# Patient Record
Sex: MALE | Race: WHITE | NOT HISPANIC OR LATINO | Employment: FULL TIME | URBAN - METROPOLITAN AREA
[De-identification: names, ages, dates, MRNs, and addresses within clinical notes are randomized per-mention and may not be internally consistent; named-entity substitution may affect disease eponyms.]

---

## 2017-07-17 ENCOUNTER — HOSPITAL ENCOUNTER (OUTPATIENT)
Dept: FAMILY MEDICINE CLINIC | Facility: CLINIC | Age: 57
Setting detail: SPECIMEN
Discharge: HOME | End: 2017-07-17
Attending: FAMILY MEDICINE | Admitting: FAMILY MEDICINE

## 2017-07-17 LAB
ALBUMIN SERPL-MCNC: 4 G/DL (ref 3.5–4.8)
ALBUMIN/GLOB SERPL: 1.5 {RATIO} (ref 1–1.7)
ALP SERPL-CCNC: 45 IU/L (ref 32–91)
ALT SERPL-CCNC: 26 IU/L (ref 17–63)
ANION GAP SERPL CALC-SCNC: 15.5 MMOL/L (ref 10–20)
AST SERPL-CCNC: 22 IU/L (ref 15–41)
BASOPHILS # BLD AUTO: 0.1 10*3/UL (ref 0–0.2)
BASOPHILS NFR BLD AUTO: 1 % (ref 0–2)
BILIRUB SERPL-MCNC: 1.1 MG/DL (ref 0.3–1.2)
BUN SERPL-MCNC: 12 MG/DL (ref 8–20)
BUN/CREAT SERPL: 10.9 (ref 6.2–20.3)
CALCIUM SERPL-MCNC: 9.1 MG/DL (ref 8.9–10.3)
CHLORIDE SERPL-SCNC: 99 MMOL/L (ref 101–111)
CHOLEST SERPL-MCNC: 222 MG/DL
CHOLEST/HDLC SERPL: 3 {RATIO}
CONV CO2: 26 MMOL/L (ref 22–32)
CONV LDL CHOLESTEROL DIRECT: 126 MG/DL (ref 0–100)
CONV TOTAL PROTEIN: 6.7 G/DL (ref 6.1–7.9)
CREAT UR-MCNC: 1.1 MG/DL (ref 0.7–1.2)
DIFFERENTIAL METHOD BLD: (no result)
EOSINOPHIL # BLD AUTO: 0.1 10*3/UL (ref 0–0.3)
EOSINOPHIL # BLD AUTO: 1 % (ref 0–3)
ERYTHROCYTE [DISTWIDTH] IN BLOOD BY AUTOMATED COUNT: 14.9 % (ref 11.5–14.5)
GLOBULIN UR ELPH-MCNC: 2.7 G/DL (ref 2.5–3.8)
GLUCOSE SERPL-MCNC: 98 MG/DL (ref 65–99)
HCT VFR BLD AUTO: 52.5 % (ref 40–54)
HDLC SERPL-MCNC: 74 MG/DL
HGB BLD-MCNC: 17.4 G/DL (ref 14–18)
LDLC/HDLC SERPL: 1.7 {RATIO}
LIPID INTERPRETATION: ABNORMAL
LYMPHOCYTES # BLD AUTO: 1.2 10*3/UL (ref 0.8–4.8)
LYMPHOCYTES NFR BLD AUTO: 15 % (ref 18–42)
MCH RBC QN AUTO: 31.4 PG (ref 26–32)
MCHC RBC AUTO-ENTMCNC: 33.1 G/DL (ref 32–36)
MCV RBC AUTO: 94.8 FL (ref 80–94)
MONOCYTES # BLD AUTO: 0.9 10*3/UL (ref 0.1–1.3)
MONOCYTES NFR BLD AUTO: 12 % (ref 2–11)
NEUTROPHILS # BLD AUTO: 5.4 10*3/UL (ref 2.3–8.6)
NEUTROPHILS NFR BLD AUTO: 71 % (ref 50–75)
NRBC BLD AUTO-RTO: 0 /100{WBCS}
NRBC/RBC NFR BLD MANUAL: 0 10*3/UL
PLATELET # BLD AUTO: 170 10*3/UL (ref 150–450)
PMV BLD AUTO: 10.1 FL (ref 7.4–10.4)
POTASSIUM SERPL-SCNC: 4.5 MMOL/L (ref 3.6–5.1)
PSA SERPL-MCNC: 0.82 NG/ML (ref 0–4)
RBC # BLD AUTO: 5.53 10*6/UL (ref 4.6–6)
SODIUM SERPL-SCNC: 136 MMOL/L (ref 136–144)
TESTOST SERPL-MCNC: 2.28 NG/ML (ref 1.7–7.8)
TRIGL SERPL-MCNC: 137 MG/DL
TSH SERPL-ACNC: 2.82 UIU/ML (ref 0.34–5.6)
VLDLC SERPL CALC-MCNC: 21.4 MG/DL
WBC # BLD AUTO: 7.7 10*3/UL (ref 4.5–11.5)

## 2017-10-16 ENCOUNTER — HOSPITAL ENCOUNTER (OUTPATIENT)
Dept: OTHER | Facility: HOSPITAL | Age: 57
Discharge: HOME | End: 2017-10-16
Attending: SURGERY | Admitting: SURGERY

## 2017-10-16 LAB
ANION GAP SERPL CALC-SCNC: 11 MMOL/L (ref 10–20)
BACTERIA SPEC AEROBE CULT: NORMAL
BASOPHILS # BLD AUTO: 0.1 10*3/UL (ref 0–0.2)
BASOPHILS NFR BLD AUTO: 1 % (ref 0–2)
BUN SERPL-MCNC: 12 MG/DL (ref 8–20)
BUN/CREAT SERPL: 10.9 (ref 6.2–20.3)
CALCIUM SERPL-MCNC: 9.3 MG/DL (ref 8.9–10.3)
CHLORIDE SERPL-SCNC: 98 MMOL/L (ref 101–111)
CONV ANISOCYTES: SLIGHT
CONV CO2: 28 MMOL/L (ref 22–32)
CONV SMEAR REVIEW: (no result)
CREAT UR-MCNC: 1.1 MG/DL (ref 0.7–1.2)
DIFFERENTIAL METHOD BLD: (no result)
EOSINOPHIL # BLD AUTO: 0.1 10*3/UL (ref 0–0.3)
EOSINOPHIL # BLD AUTO: 1 % (ref 0–3)
ERYTHROCYTE [DISTWIDTH] IN BLOOD BY AUTOMATED COUNT: 14.8 % (ref 11.5–14.5)
GLUCOSE SERPL-MCNC: 85 MG/DL (ref 65–99)
HCT VFR BLD AUTO: 46.8 % (ref 40–54)
HGB BLD-MCNC: 15.6 G/DL (ref 14–18)
LYMPHOCYTES # BLD AUTO: 1.5 10*3/UL (ref 0.8–4.8)
LYMPHOCYTES NFR BLD AUTO: 19 % (ref 18–42)
Lab: NORMAL
MCH RBC QN AUTO: 31.5 PG (ref 26–32)
MCHC RBC AUTO-ENTMCNC: 33.4 G/DL (ref 32–36)
MCV RBC AUTO: 94.3 FL (ref 80–94)
MICRO REPORT STATUS: NORMAL
MONOCYTES # BLD AUTO: 1.2 10*3/UL (ref 0.1–1.3)
MONOCYTES NFR BLD AUTO: 15 % (ref 2–11)
NEUTROPHILS # BLD AUTO: 5 10*3/UL (ref 2.3–8.6)
NEUTROPHILS NFR BLD AUTO: 64 % (ref 50–75)
NRBC BLD AUTO-RTO: 0 /100{WBCS}
PLATELET # BLD AUTO: 98 10*3/UL (ref 150–450)
PLT COMMENT: (no result)
PMV BLD AUTO: 9.8 FL (ref 7.4–10.4)
POTASSIUM SERPL-SCNC: 4 MMOL/L (ref 3.6–5.1)
RBC # BLD AUTO: 4.96 10*6/UL (ref 4.6–6)
SODIUM SERPL-SCNC: 133 MMOL/L (ref 136–144)
SPECIMEN SOURCE: NORMAL
WBC # BLD AUTO: 7.9 10*3/UL (ref 4.5–11.5)

## 2017-10-23 ENCOUNTER — HOSPITAL ENCOUNTER (OUTPATIENT)
Dept: PREOP | Facility: HOSPITAL | Age: 57
Setting detail: HOSPITAL OUTPATIENT SURGERY
Discharge: HOME | End: 2017-10-23
Attending: SURGERY | Admitting: SURGERY

## 2017-10-23 LAB
APTT BLD: 25.6 SEC (ref 24–31)
CONV ADP AGGREGATION, % PLATELET: 93 % (ref 86–100)
INR PPP: 1.1
PROTHROMBIN TIME: 11.4 SEC (ref 9.6–11.7)

## 2018-01-09 ENCOUNTER — HOSPITAL ENCOUNTER (OUTPATIENT)
Dept: FAMILY MEDICINE CLINIC | Facility: CLINIC | Age: 58
Setting detail: SPECIMEN
Discharge: HOME | End: 2018-01-09
Attending: FAMILY MEDICINE | Admitting: FAMILY MEDICINE

## 2018-01-09 LAB
BASOPHILS # BLD AUTO: 0.1 10*3/UL (ref 0–0.2)
BASOPHILS NFR BLD AUTO: 1 % (ref 0–2)
CHOLEST SERPL-MCNC: 188 MG/DL
CHOLEST/HDLC SERPL: 2.1 {RATIO}
CONV LDL CHOLESTEROL DIRECT: 87 MG/DL (ref 0–100)
DIFFERENTIAL METHOD BLD: (no result)
EOSINOPHIL # BLD AUTO: 0.1 10*3/UL (ref 0–0.3)
EOSINOPHIL # BLD AUTO: 2 % (ref 0–3)
ERYTHROCYTE [DISTWIDTH] IN BLOOD BY AUTOMATED COUNT: 14.5 % (ref 11.5–14.5)
HCT VFR BLD AUTO: 49.1 % (ref 40–54)
HDLC SERPL-MCNC: 89 MG/DL
HGB BLD-MCNC: 16.4 G/DL (ref 14–18)
LDLC/HDLC SERPL: 1 {RATIO}
LIPID INTERPRETATION: NORMAL
LYMPHOCYTES # BLD AUTO: 1.4 10*3/UL (ref 0.8–4.8)
LYMPHOCYTES NFR BLD AUTO: 19 % (ref 18–42)
MCH RBC QN AUTO: 31.3 PG (ref 26–32)
MCHC RBC AUTO-ENTMCNC: 33.4 G/DL (ref 32–36)
MCV RBC AUTO: 93.8 FL (ref 80–94)
MONOCYTES # BLD AUTO: 1 10*3/UL (ref 0.1–1.3)
MONOCYTES NFR BLD AUTO: 13 % (ref 2–11)
NEUTROPHILS # BLD AUTO: 5.1 10*3/UL (ref 2.3–8.6)
NEUTROPHILS NFR BLD AUTO: 65 % (ref 50–75)
NRBC BLD AUTO-RTO: 0 /100{WBCS}
NRBC/RBC NFR BLD MANUAL: 0 10*3/UL
PLATELET # BLD AUTO: 122 10*3/UL (ref 150–450)
PMV BLD AUTO: 10.5 FL (ref 7.4–10.4)
RBC # BLD AUTO: 5.23 10*6/UL (ref 4.6–6)
TRIGL SERPL-MCNC: 57 MG/DL
VLDLC SERPL CALC-MCNC: 12.5 MG/DL
WBC # BLD AUTO: 7.7 10*3/UL (ref 4.5–11.5)

## 2018-10-24 ENCOUNTER — HOSPITAL ENCOUNTER (OUTPATIENT)
Dept: FAMILY MEDICINE CLINIC | Facility: CLINIC | Age: 58
Setting detail: SPECIMEN
Discharge: HOME | End: 2018-10-24
Attending: FAMILY MEDICINE | Admitting: FAMILY MEDICINE

## 2018-10-24 LAB
ALBUMIN SERPL-MCNC: 3.7 G/DL (ref 3.5–4.8)
ALBUMIN/GLOB SERPL: 1.5 {RATIO} (ref 1–1.7)
ALP SERPL-CCNC: 47 IU/L (ref 32–91)
ALT SERPL-CCNC: 31 IU/L (ref 17–63)
ANION GAP SERPL CALC-SCNC: 14.3 MMOL/L (ref 10–20)
AST SERPL-CCNC: 29 IU/L (ref 15–41)
BASOPHILS # BLD AUTO: 0.1 10*3/UL (ref 0–0.2)
BASOPHILS NFR BLD AUTO: 1 % (ref 0–2)
BILIRUB SERPL-MCNC: 0.8 MG/DL (ref 0.3–1.2)
BILIRUB UR QL STRIP: NEGATIVE MG/DL
BUN SERPL-MCNC: 13 MG/DL (ref 8–20)
BUN/CREAT SERPL: 13 (ref 6.2–20.3)
CALCIUM SERPL-MCNC: 8.7 MG/DL (ref 8.9–10.3)
CASTS URNS QL MICRO: NORMAL /[LPF]
CHLORIDE SERPL-SCNC: 101 MMOL/L (ref 101–111)
CHOLEST SERPL-MCNC: 185 MG/DL
CHOLEST/HDLC SERPL: 1.9 {RATIO}
COLOR UR: YELLOW
CONV BACTERIA IN URINE MICRO: NEGATIVE
CONV CLARITY OF URINE: CLEAR
CONV CO2: 25 MMOL/L (ref 22–32)
CONV HYALINE CASTS IN URINE MICRO: 0 /[LPF] (ref 0–5)
CONV LDL CHOLESTEROL DIRECT: 79 MG/DL (ref 0–100)
CONV PROTEIN IN URINE BY AUTOMATED TEST STRIP: NEGATIVE MG/DL
CONV SMALL ROUND CELLS: NORMAL /[HPF]
CONV TOTAL PROTEIN: 6.2 G/DL (ref 6.1–7.9)
CONV UROBILINOGEN IN URINE BY AUTOMATED TEST STRIP: 0.2 MG/DL
CREAT UR-MCNC: 1 MG/DL (ref 0.7–1.2)
CULTURE INDICATED?: NORMAL
DIFFERENTIAL METHOD BLD: (no result)
EOSINOPHIL # BLD AUTO: 0.2 10*3/UL (ref 0–0.3)
EOSINOPHIL # BLD AUTO: 3 % (ref 0–3)
ERYTHROCYTE [DISTWIDTH] IN BLOOD BY AUTOMATED COUNT: 15 % (ref 11.5–14.5)
GLOBULIN UR ELPH-MCNC: 2.5 G/DL (ref 2.5–3.8)
GLUCOSE SERPL-MCNC: 98 MG/DL (ref 65–99)
GLUCOSE UR QL: NEGATIVE MG/DL
HCT VFR BLD AUTO: 46.7 % (ref 40–54)
HDLC SERPL-MCNC: 96 MG/DL
HGB BLD-MCNC: 15.9 G/DL (ref 14–18)
HGB UR QL STRIP: NEGATIVE
KETONES UR QL STRIP: NEGATIVE MG/DL
LDLC/HDLC SERPL: 0.8 {RATIO}
LEUKOCYTE ESTERASE UR QL STRIP: NEGATIVE
LIPID INTERPRETATION: NORMAL
LYMPHOCYTES # BLD AUTO: 1 10*3/UL (ref 0.8–4.8)
LYMPHOCYTES NFR BLD AUTO: 18 % (ref 18–42)
MCH RBC QN AUTO: 31.8 PG (ref 26–32)
MCHC RBC AUTO-ENTMCNC: 34 G/DL (ref 32–36)
MCV RBC AUTO: 93.5 FL (ref 80–94)
MONOCYTES # BLD AUTO: 0.8 10*3/UL (ref 0.1–1.3)
MONOCYTES NFR BLD AUTO: 15 % (ref 2–11)
NEUTROPHILS # BLD AUTO: 3.4 10*3/UL (ref 2.3–8.6)
NEUTROPHILS NFR BLD AUTO: 63 % (ref 50–75)
NITRITE UR QL STRIP: NEGATIVE
NRBC BLD AUTO-RTO: 0 /100{WBCS}
NRBC/RBC NFR BLD MANUAL: 0 10*3/UL
PH UR STRIP.AUTO: 8 [PH] (ref 4.5–8)
PLATELET # BLD AUTO: 106 10*3/UL (ref 150–450)
PMV BLD AUTO: 9.8 FL (ref 7.4–10.4)
POTASSIUM SERPL-SCNC: 4.3 MMOL/L (ref 3.6–5.1)
PSA SERPL-MCNC: 0.89 NG/ML (ref 0–4)
RBC # BLD AUTO: 4.99 10*6/UL (ref 4.6–6)
RBC #/AREA URNS HPF: 1 /[HPF] (ref 0–3)
SODIUM SERPL-SCNC: 136 MMOL/L (ref 136–144)
SP GR UR: 1.02 (ref 1–1.03)
SPERM URNS QL MICRO: NORMAL /[HPF]
SQUAMOUS SPT QL MICRO: 0 /[HPF] (ref 0–5)
TESTOST SERPL-MCNC: 8.3 NG/ML (ref 1.7–7.8)
TRIGL SERPL-MCNC: 36 MG/DL
TSH SERPL-ACNC: 3.15 UIU/ML (ref 0.34–5.6)
UNIDENT CRYS URNS QL MICRO: NORMAL /[HPF]
VLDLC SERPL CALC-MCNC: 10.8 MG/DL
WBC # BLD AUTO: 5.4 10*3/UL (ref 4.5–11.5)
WBC #/AREA URNS HPF: 1 /[HPF] (ref 0–5)
YEAST SPEC QL WET PREP: NORMAL /[HPF]

## 2019-04-29 ENCOUNTER — CONVERSION ENCOUNTER (OUTPATIENT)
Dept: FAMILY MEDICINE CLINIC | Facility: CLINIC | Age: 59
End: 2019-04-29

## 2019-04-29 ENCOUNTER — HOSPITAL ENCOUNTER (OUTPATIENT)
Dept: FAMILY MEDICINE CLINIC | Facility: CLINIC | Age: 59
Setting detail: SPECIMEN
Discharge: HOME | End: 2019-04-29
Attending: FAMILY MEDICINE | Admitting: FAMILY MEDICINE

## 2019-04-29 LAB
ALBUMIN SERPL-MCNC: 3.9 G/DL (ref 3.5–4.8)
ALBUMIN/GLOB SERPL: 1.4 {RATIO} (ref 1–1.7)
ALP SERPL-CCNC: 41 IU/L (ref 32–91)
ALT SERPL-CCNC: 27 IU/L (ref 17–63)
ANION GAP SERPL CALC-SCNC: 13.6 MMOL/L (ref 10–20)
AST SERPL-CCNC: 27 IU/L (ref 15–41)
BASOPHILS # BLD AUTO: 0.1 10*3/UL (ref 0–0.2)
BASOPHILS NFR BLD AUTO: 1 % (ref 0–2)
BILIRUB SERPL-MCNC: 0.7 MG/DL (ref 0.3–1.2)
BUN SERPL-MCNC: 8 MG/DL (ref 8–20)
BUN/CREAT SERPL: 7.3 (ref 6.2–20.3)
CALCIUM SERPL-MCNC: 9.3 MG/DL (ref 8.9–10.3)
CHLORIDE SERPL-SCNC: 101 MMOL/L (ref 101–111)
CHOLEST SERPL-MCNC: 155 MG/DL
CHOLEST/HDLC SERPL: 2 {RATIO}
CONV CO2: 26 MMOL/L (ref 22–32)
CONV LDL CHOLESTEROL DIRECT: 66 MG/DL (ref 0–100)
CONV TOTAL PROTEIN: 6.7 G/DL (ref 6.1–7.9)
CREAT UR-MCNC: 1.1 MG/DL (ref 0.7–1.2)
DIFFERENTIAL METHOD BLD: (no result)
EOSINOPHIL # BLD AUTO: 0.1 10*3/UL (ref 0–0.3)
EOSINOPHIL # BLD AUTO: 2 % (ref 0–3)
ERYTHROCYTE [DISTWIDTH] IN BLOOD BY AUTOMATED COUNT: 14.7 % (ref 11.5–14.5)
GLOBULIN UR ELPH-MCNC: 2.8 G/DL (ref 2.5–3.8)
GLUCOSE SERPL-MCNC: 100 MG/DL (ref 65–99)
HCT VFR BLD AUTO: 50.3 % (ref 40–54)
HDLC SERPL-MCNC: 77 MG/DL
HGB BLD-MCNC: 16.7 G/DL (ref 14–18)
LDLC/HDLC SERPL: 0.9 {RATIO}
LIPID INTERPRETATION: NORMAL
LYMPHOCYTES # BLD AUTO: 1 10*3/UL (ref 0.8–4.8)
LYMPHOCYTES NFR BLD AUTO: 18 % (ref 18–42)
MCH RBC QN AUTO: 31.3 PG (ref 26–32)
MCHC RBC AUTO-ENTMCNC: 33.1 G/DL (ref 32–36)
MCV RBC AUTO: 94.5 FL (ref 80–94)
MONOCYTES # BLD AUTO: 0.7 10*3/UL (ref 0.1–1.3)
MONOCYTES NFR BLD AUTO: 13 % (ref 2–11)
NEUTROPHILS # BLD AUTO: 3.7 10*3/UL (ref 2.3–8.6)
NEUTROPHILS NFR BLD AUTO: 66 % (ref 50–75)
NRBC BLD AUTO-RTO: 0 /100{WBCS}
NRBC/RBC NFR BLD MANUAL: 0 10*3/UL
PLATELET # BLD AUTO: 131 10*3/UL (ref 150–450)
PMV BLD AUTO: 10.2 FL (ref 7.4–10.4)
POTASSIUM SERPL-SCNC: 4.6 MMOL/L (ref 3.6–5.1)
RBC # BLD AUTO: 5.32 10*6/UL (ref 4.6–6)
SODIUM SERPL-SCNC: 136 MMOL/L (ref 136–144)
TRIGL SERPL-MCNC: 41 MG/DL
VLDLC SERPL CALC-MCNC: 11.3 MG/DL
WBC # BLD AUTO: 5.7 10*3/UL (ref 4.5–11.5)

## 2019-06-03 VITALS
WEIGHT: 250 LBS | SYSTOLIC BLOOD PRESSURE: 130 MMHG | DIASTOLIC BLOOD PRESSURE: 86 MMHG | HEART RATE: 68 BPM | BODY MASS INDEX: 35.87 KG/M2 | RESPIRATION RATE: 8 BRPM

## 2019-07-17 ENCOUNTER — TRANSCRIBE ORDERS (OUTPATIENT)
Dept: ADMINISTRATIVE | Facility: HOSPITAL | Age: 59
End: 2019-07-17

## 2019-07-17 DIAGNOSIS — Z00.00 ROUTINE GENERAL MEDICAL EXAMINATION AT A HEALTH CARE FACILITY: Primary | ICD-10-CM

## 2019-07-29 RX ORDER — ATENOLOL 50 MG/1
TABLET ORAL
Qty: 30 TABLET | Refills: 8 | Status: SHIPPED | OUTPATIENT
Start: 2019-07-29 | End: 2020-04-24

## 2019-07-29 RX ORDER — OMEPRAZOLE 40 MG/1
CAPSULE, DELAYED RELEASE ORAL
Qty: 30 CAPSULE | Refills: 2 | Status: SHIPPED | OUTPATIENT
Start: 2019-07-29 | End: 2020-10-26

## 2019-08-12 RX ORDER — ATORVASTATIN CALCIUM 20 MG/1
TABLET, FILM COATED ORAL
Qty: 30 TABLET | Refills: 3 | Status: SHIPPED | OUTPATIENT
Start: 2019-08-12 | End: 2019-12-14 | Stop reason: SDUPTHER

## 2019-09-12 ENCOUNTER — TELEPHONE (OUTPATIENT)
Dept: FAMILY MEDICINE CLINIC | Facility: CLINIC | Age: 59
End: 2019-09-12

## 2019-09-12 RX ORDER — DULOXETIN HYDROCHLORIDE 60 MG/1
1 CAPSULE, DELAYED RELEASE ORAL EVERY 24 HOURS
COMMUNITY
Start: 2017-08-24 | End: 2019-12-30

## 2019-09-12 RX ORDER — CITALOPRAM 20 MG/1
TABLET ORAL EVERY 24 HOURS
COMMUNITY
Start: 2017-12-03 | End: 2019-10-13 | Stop reason: SDUPTHER

## 2019-09-12 NOTE — TELEPHONE ENCOUNTER
[facility] called questioning pt being on Cymbalta and Celxa.  Before I call them back, I need to make sure that he, in fact, is to be on both?  Both are in centricity as well.  Thanks!

## 2019-09-27 ENCOUNTER — HOSPITAL ENCOUNTER (OUTPATIENT)
Dept: CT IMAGING | Facility: HOSPITAL | Age: 59
Discharge: HOME | End: 2019-09-27

## 2019-09-27 ENCOUNTER — HOSPITAL ENCOUNTER (OUTPATIENT)
Dept: CARDIOLOGY | Facility: HOSPITAL | Age: 59
Discharge: HOME | End: 2019-09-27
Admitting: FAMILY MEDICINE

## 2019-09-27 DIAGNOSIS — Z00.00 ROUTINE GENERAL MEDICAL EXAMINATION AT A HEALTH CARE FACILITY: ICD-10-CM

## 2019-09-27 LAB
BH CV XLRA MEAS - MID AO DIAM: 1.94 CM
BH CV XLRA MEAS - PAD LEFT ABI PT: 1.33
BH CV XLRA MEAS - PAD LEFT ARM: 119 MMHG
BH CV XLRA MEAS - PAD LEFT LEG PT: 158 MMHG
BH CV XLRA MEAS - PAD RIGHT ABI PT: 1.2
BH CV XLRA MEAS - PAD RIGHT ARM: 118 MMHG
BH CV XLRA MEAS - PAD RIGHT LEG PT: 143 MMHG
BH CV XLRA MEAS LEFT CCA RATIO VEL: 72.9 CM/SEC
BH CV XLRA MEAS LEFT ICA RATIO VEL: -66.7 CM/SEC
BH CV XLRA MEAS LEFT ICA/CCA RATIO: -0.91
BH CV XLRA MEAS RIGHT CCA RATIO VEL: 76 CM/SEC
BH CV XLRA MEAS RIGHT ICA RATIO VEL: -68 CM/SEC
BH CV XLRA MEAS RIGHT ICA/CCA RATIO: -0.89

## 2019-09-27 PROCEDURE — 75571 CT HRT W/O DYE W/CA TEST: CPT

## 2019-09-27 PROCEDURE — 93799 UNLISTED CV SVC/PROCEDURE: CPT

## 2019-09-27 RX ORDER — TRAZODONE HYDROCHLORIDE 150 MG/1
TABLET ORAL
Qty: 30 TABLET | Refills: 2 | Status: SHIPPED | OUTPATIENT
Start: 2019-09-27 | End: 2019-12-27

## 2019-10-14 RX ORDER — CITALOPRAM 20 MG/1
TABLET ORAL
Qty: 30 TABLET | Refills: 5 | Status: SHIPPED | OUTPATIENT
Start: 2019-10-14 | End: 2020-04-14

## 2019-10-25 ENCOUNTER — OFFICE VISIT (OUTPATIENT)
Dept: FAMILY MEDICINE CLINIC | Facility: CLINIC | Age: 59
End: 2019-10-25

## 2019-10-25 VITALS
SYSTOLIC BLOOD PRESSURE: 128 MMHG | DIASTOLIC BLOOD PRESSURE: 80 MMHG | RESPIRATION RATE: 12 BRPM | WEIGHT: 259.8 LBS | BODY MASS INDEX: 37.28 KG/M2 | HEART RATE: 67 BPM

## 2019-10-25 DIAGNOSIS — R25.2 LEG CRAMPS: ICD-10-CM

## 2019-10-25 DIAGNOSIS — M75.81 ROTATOR CUFF TENDONITIS, RIGHT: ICD-10-CM

## 2019-10-25 DIAGNOSIS — M75.51 SUBACROMIAL BURSITIS OF RIGHT SHOULDER JOINT: ICD-10-CM

## 2019-10-25 DIAGNOSIS — Z00.00 PHYSICAL EXAM: Primary | ICD-10-CM

## 2019-10-25 DIAGNOSIS — D50.9 IRON DEFICIENCY ANEMIA, UNSPECIFIED IRON DEFICIENCY ANEMIA TYPE: ICD-10-CM

## 2019-10-25 DIAGNOSIS — Z12.11 COLON CANCER SCREENING: ICD-10-CM

## 2019-10-25 DIAGNOSIS — M17.0 PRIMARY OSTEOARTHRITIS OF BOTH KNEES: ICD-10-CM

## 2019-10-25 PROBLEM — K21.9 GASTROESOPHAGEAL REFLUX DISEASE: Status: ACTIVE | Noted: 2019-10-25

## 2019-10-25 LAB
ALBUMIN SERPL-MCNC: 4.1 G/DL (ref 3.5–5.2)
ALBUMIN/GLOB SERPL: 1.2 G/DL
ALP SERPL-CCNC: 58 U/L (ref 39–117)
ALT SERPL W P-5'-P-CCNC: 21 U/L (ref 1–41)
ANION GAP SERPL CALCULATED.3IONS-SCNC: 12.3 MMOL/L (ref 5–15)
AST SERPL-CCNC: 24 U/L (ref 1–40)
BASOPHILS # BLD AUTO: 0.1 10*3/MM3 (ref 0–0.2)
BASOPHILS NFR BLD AUTO: 1.7 % (ref 0–1.5)
BILIRUB SERPL-MCNC: 0.6 MG/DL (ref 0.2–1.2)
BILIRUB UR QL STRIP: NEGATIVE
BUN BLD-MCNC: 11 MG/DL (ref 6–20)
BUN/CREAT SERPL: 11.5 (ref 7–25)
CALCIUM SPEC-SCNC: 9.3 MG/DL (ref 8.6–10.5)
CHLORIDE SERPL-SCNC: 99 MMOL/L (ref 98–107)
CHOLEST SERPL-MCNC: 189 MG/DL (ref 0–200)
CLARITY UR: CLEAR
CO2 SERPL-SCNC: 25.7 MMOL/L (ref 22–29)
COLOR UR: YELLOW
CREAT BLD-MCNC: 0.96 MG/DL (ref 0.76–1.27)
DEPRECATED RDW RBC AUTO: 42 FL (ref 37–54)
EOSINOPHIL # BLD AUTO: 0.06 10*3/MM3 (ref 0–0.4)
EOSINOPHIL NFR BLD AUTO: 1 % (ref 0.3–6.2)
ERYTHROCYTE [DISTWIDTH] IN BLOOD BY AUTOMATED COUNT: 13 % (ref 12.3–15.4)
GFR SERPL CREATININE-BSD FRML MDRD: 80 ML/MIN/1.73
GLOBULIN UR ELPH-MCNC: 3.3 GM/DL
GLUCOSE BLD-MCNC: 95 MG/DL (ref 65–99)
GLUCOSE UR STRIP-MCNC: NEGATIVE MG/DL
HCT VFR BLD AUTO: 37 % (ref 37.5–51)
HDLC SERPL-MCNC: 105 MG/DL (ref 40–60)
HGB BLD-MCNC: 13 G/DL (ref 13–17.7)
HGB UR QL STRIP.AUTO: NEGATIVE
IMM GRANULOCYTES # BLD AUTO: 0.02 10*3/MM3 (ref 0–0.05)
IMM GRANULOCYTES NFR BLD AUTO: 0.3 % (ref 0–0.5)
KETONES UR QL STRIP: NEGATIVE
LDLC SERPL CALC-MCNC: 73 MG/DL (ref 0–100)
LDLC/HDLC SERPL: 0.7 {RATIO}
LEUKOCYTE ESTERASE UR QL STRIP.AUTO: NEGATIVE
LYMPHOCYTES # BLD AUTO: 0.95 10*3/MM3 (ref 0.7–3.1)
LYMPHOCYTES NFR BLD AUTO: 15.8 % (ref 19.6–45.3)
MCH RBC QN AUTO: 31.8 PG (ref 26.6–33)
MCHC RBC AUTO-ENTMCNC: 35.1 G/DL (ref 31.5–35.7)
MCV RBC AUTO: 90.5 FL (ref 79–97)
MONOCYTES # BLD AUTO: 0.95 10*3/MM3 (ref 0.1–0.9)
MONOCYTES NFR BLD AUTO: 15.8 % (ref 5–12)
NEUTROPHILS # BLD AUTO: 3.95 10*3/MM3 (ref 1.7–7)
NEUTROPHILS NFR BLD AUTO: 65.4 % (ref 42.7–76)
NITRITE UR QL STRIP: NEGATIVE
NRBC BLD AUTO-RTO: 0 /100 WBC (ref 0–0.2)
PH UR STRIP.AUTO: 7 [PH] (ref 5–8)
PLATELET # BLD AUTO: 156 10*3/MM3 (ref 140–450)
PMV BLD AUTO: 12.6 FL (ref 6–12)
POTASSIUM BLD-SCNC: 4.2 MMOL/L (ref 3.5–5.2)
PROT SERPL-MCNC: 7.4 G/DL (ref 6–8.5)
PROT UR QL STRIP: NEGATIVE
PSA SERPL-MCNC: 0.69 NG/ML (ref 0–4)
RBC # BLD AUTO: 4.09 10*6/MM3 (ref 4.14–5.8)
SODIUM BLD-SCNC: 137 MMOL/L (ref 136–145)
SP GR UR STRIP: 1.01 (ref 1–1.03)
TESTOST SERPL-MCNC: 158 NG/DL (ref 193–740)
TRIGL SERPL-MCNC: 53 MG/DL (ref 0–150)
TSH SERPL DL<=0.05 MIU/L-ACNC: 2 UIU/ML (ref 0.27–4.2)
UROBILINOGEN UR QL STRIP: NORMAL
VLDLC SERPL-MCNC: 10.6 MG/DL (ref 5–40)
WBC NRBC COR # BLD: 6.03 10*3/MM3 (ref 3.4–10.8)

## 2019-10-25 PROCEDURE — 80053 COMPREHEN METABOLIC PANEL: CPT | Performed by: FAMILY MEDICINE

## 2019-10-25 PROCEDURE — 82728 ASSAY OF FERRITIN: CPT | Performed by: FAMILY MEDICINE

## 2019-10-25 PROCEDURE — 83540 ASSAY OF IRON: CPT | Performed by: FAMILY MEDICINE

## 2019-10-25 PROCEDURE — 99396 PREV VISIT EST AGE 40-64: CPT | Performed by: FAMILY MEDICINE

## 2019-10-25 PROCEDURE — 84466 ASSAY OF TRANSFERRIN: CPT | Performed by: FAMILY MEDICINE

## 2019-10-25 PROCEDURE — 84443 ASSAY THYROID STIM HORMONE: CPT | Performed by: FAMILY MEDICINE

## 2019-10-25 PROCEDURE — 36415 COLL VENOUS BLD VENIPUNCTURE: CPT | Performed by: FAMILY MEDICINE

## 2019-10-25 PROCEDURE — G0103 PSA SCREENING: HCPCS | Performed by: FAMILY MEDICINE

## 2019-10-25 PROCEDURE — 81003 URINALYSIS AUTO W/O SCOPE: CPT | Performed by: FAMILY MEDICINE

## 2019-10-25 PROCEDURE — 80061 LIPID PANEL: CPT | Performed by: FAMILY MEDICINE

## 2019-10-25 PROCEDURE — 85025 COMPLETE CBC W/AUTO DIFF WBC: CPT | Performed by: FAMILY MEDICINE

## 2019-10-25 PROCEDURE — 84403 ASSAY OF TOTAL TESTOSTERONE: CPT | Performed by: FAMILY MEDICINE

## 2019-10-25 RX ORDER — MELOXICAM 15 MG/1
15 TABLET ORAL DAILY
Qty: 30 TABLET | Refills: 11 | Status: SHIPPED | OUTPATIENT
Start: 2019-10-25 | End: 2020-11-06

## 2019-10-25 RX ORDER — AMLODIPINE BESYLATE 2.5 MG/1
TABLET ORAL
COMMUNITY
Start: 2019-01-29 | End: 2020-01-21

## 2019-10-25 RX ORDER — LISINOPRIL 40 MG/1
TABLET ORAL EVERY 24 HOURS
COMMUNITY
Start: 2018-07-11 | End: 2019-10-28 | Stop reason: SDUPTHER

## 2019-10-25 NOTE — PROGRESS NOTES
Rooming Tab(CC,VS,Pt Hx,Fall Screen)  Chief Complaint   Patient presents with   • Annual Exam       Subjective 59-year-old here for his physical.  Patient is not exercising and eating poorly.  His weight has gone back up.  He was off testosterone because his insurance would not cover it.  He is also having bad leg cramps usually at night, and inner thighs primarily.  He has significant water intake and is not dehydrated.  He also drinks a significant amount of caffeine.  He denies any chest pain or dizziness or syncope.  He has no complaints of his bowel or bladder function has no blood in his stool or urine.  He has had some right sided shoulder pain that started 6 months ago.  He has had no injury.  He has difficulty raising his right shoulder up, he is right-handed.    I have reviewed and updated his medications, medical history and problem list during today's office visit.     Patient Care Team:  Cuong Burrell MD as PCP - General    Problem List Tab  Medications Tab  Synopsis Tab  Chart Review Tab  Care Everywhere Tab  Immunizations Tab  Patient History Tab    Social History     Tobacco Use   • Smoking status: Never Smoker   • Smokeless tobacco: Never Used   Substance Use Topics   • Alcohol use: Yes     Frequency: 2-3 times a week     Drinks per session: 1 or 2     Binge frequency: Monthly       Review of Systems   Constitutional: Negative for chills, fatigue and fever.   HENT: Negative for nosebleeds.    Eyes: Negative for double vision.   Respiratory: Negative for chest tightness and shortness of breath.    Cardiovascular: Negative for chest pain and palpitations.   Gastrointestinal: Negative for blood in stool.   Genitourinary: Negative for dysuria and hematuria.   Neurological: Negative for dizziness and syncope.   Psychiatric/Behavioral: Negative for depressed mood.       Objective     Rooming Tab(CC,VS,Pt Hx,Fall Screen)  /80 (BP Location: Right arm, Patient Position: Sitting, Cuff Size: Large  Adult)   Pulse 67   Resp 12   Wt 118 kg (259 lb 12.8 oz)   BMI 37.28 kg/m²     Body mass index is 37.28 kg/m².    Physical Exam   Constitutional: He is oriented to person, place, and time. He appears well-developed and well-nourished. No distress.   Obese, pleasant, no acute distress   HENT:   Head: Normocephalic and atraumatic.   Nose: Nose normal.   Mouth/Throat: Oropharynx is clear and moist.   Eyes: Conjunctivae, EOM and lids are normal. Pupils are equal, round, and reactive to light.   Neck: Trachea normal and normal range of motion. Neck supple. No JVD present. Carotid bruit is not present. No thyroid mass and no thyromegaly present.   No carotid bruits   Cardiovascular: Normal rate, regular rhythm, normal heart sounds and intact distal pulses.   Pulmonary/Chest: Effort normal and breath sounds normal.   Abdominal: Soft. Bowel sounds are normal. He exhibits no distension and no mass. There is no tenderness.   Obese   Genitourinary: Rectum normal and prostate normal.   Musculoskeletal:   No c/c/e  Good dorsalis pedis pulses.  Decreased range of motion of right shoulder and abduction against resistance   Neurological: He is alert and oriented to person, place, and time. No cranial nerve deficit.   Skin: Skin is warm and dry.   Psychiatric: He has a normal mood and affect. His speech is normal and behavior is normal. Judgment and thought content normal. He is attentive.   Nursing note and vitals reviewed.       Statin Choice Calculator  Data Reviewed:               Lab Results   Component Value Date    BUN 11 10/25/2019    CREATININE 0.96 10/25/2019    EGFRIFNONA 80 10/25/2019     10/25/2019    K 4.2 10/25/2019    CL 99 10/25/2019    CALCIUM 9.3 10/25/2019    ALBUMIN 4.10 10/25/2019    BILITOT 0.6 10/25/2019    ALKPHOS 58 10/25/2019    AST 24 10/25/2019    ALT 21 10/25/2019    TRIG 53 10/25/2019     (H) 10/25/2019    VLDL 10.6 10/25/2019    LDL 73 10/25/2019    LDLHDL 0.70 10/25/2019    WBC 6.03  10/25/2019    RBC 4.09 (L) 10/25/2019    HCT 37.0 (L) 10/25/2019    MCV 90.5 10/25/2019    MCH 31.8 10/25/2019    TSH 2.000 10/25/2019    PSA 0.687 10/25/2019      Assessment/Plan   Order Review Tab  Health Maintenance Tab  Patient Plan/Order Tab  Diagnoses and all orders for this visit:    1. Physical exam (Primary)  Assessment & Plan:  exersice for cardiovascular and weight loss  Nutritious diet with low carb, high veg  immunizations    Orders:  -     Cancel: Urinalysis With Microscopic - Urine, Clean Catch  -     CBC & Differential  -     Comprehensive Metabolic Panel  -     Lipid Panel  -     Testosterone  -     TSH  -     Urinalysis With Culture If Indicated - Urine, Clean Catch  -     PSA Screen  -     CBC Auto Differential    2. Colon cancer screening  -     Ambulatory Referral For Screening Colonoscopy    3. Subacromial bursitis of right shoulder joint  Assessment & Plan:  mobic 15mg q d  rom exercises  Consider injection and pt if persists    Orders:  -     Ambulatory Referral to Orthopedic Surgery    4. Rotator cuff tendonitis, right  -     Ambulatory Referral to Orthopedic Surgery    5. Leg cramps  Assessment & Plan:  Decrease caffeine  Hold lipitor for 1 month  stretching      6. Primary osteoarthritis of both knees  -     Ambulatory Referral to Orthopedic Surgery    Other orders  -     meloxicam (MOBIC) 15 MG tablet; Take 1 tablet by mouth Daily. For arthritis  Dispense: 30 tablet; Refill: 11      Wrapup Tab  Return in about 6 months (around 4/25/2020) for Recheck.

## 2019-10-25 NOTE — ASSESSMENT & PLAN NOTE
exersice for cardiovascular and weight loss  Nutritious diet with low carb, high veg  immunizations

## 2019-10-28 ENCOUNTER — LAB (OUTPATIENT)
Dept: FAMILY MEDICINE CLINIC | Facility: CLINIC | Age: 59
End: 2019-10-28

## 2019-10-28 DIAGNOSIS — D50.9 IRON DEFICIENCY ANEMIA, UNSPECIFIED IRON DEFICIENCY ANEMIA TYPE: Primary | ICD-10-CM

## 2019-10-28 DIAGNOSIS — D50.9 IRON DEFICIENCY ANEMIA, UNSPECIFIED IRON DEFICIENCY ANEMIA TYPE: ICD-10-CM

## 2019-10-28 LAB
FERRITIN SERPL-MCNC: 306 NG/ML (ref 30–400)
IRON 24H UR-MRATE: 105 MCG/DL (ref 59–158)
IRON SATN MFR SERPL: 33 % (ref 20–50)
TIBC SERPL-MCNC: 322 MCG/DL (ref 298–536)
TRANSFERRIN SERPL-MCNC: 216 MG/DL (ref 200–360)

## 2019-10-28 PROCEDURE — 82607 VITAMIN B-12: CPT | Performed by: FAMILY MEDICINE

## 2019-10-28 PROCEDURE — 36415 COLL VENOUS BLD VENIPUNCTURE: CPT

## 2019-10-28 PROCEDURE — 82746 ASSAY OF FOLIC ACID SERUM: CPT | Performed by: FAMILY MEDICINE

## 2019-10-28 RX ORDER — LISINOPRIL 40 MG/1
TABLET ORAL
Qty: 30 TABLET | Refills: 6 | Status: SHIPPED | OUTPATIENT
Start: 2019-10-28 | End: 2020-05-26

## 2019-10-29 LAB
FOLATE SERPL-MCNC: >20 NG/ML (ref 4.78–24.2)
VIT B12 BLD-MCNC: 296 PG/ML (ref 211–946)

## 2019-11-01 ENCOUNTER — TELEPHONE (OUTPATIENT)
Dept: FAMILY MEDICINE CLINIC | Facility: CLINIC | Age: 59
End: 2019-11-01

## 2019-11-01 NOTE — TELEPHONE ENCOUNTER
----- Message from Cuong Burrell MD sent at 10/29/2019  2:09 PM EDT -----  Call in  Testosterone  200mg/ml   1 cc im q 2 weeks    #10   Cc   5 refills  Call in 3 cc syringes 1 box   5 erefills  Call in 21 gauge needles    1.5 inch   1 box

## 2019-11-25 RX ORDER — SYRINGE WITH NEEDLE, 1 ML 25GX5/8"
SYRINGE, EMPTY DISPOSABLE MISCELLANEOUS
Qty: 30 EACH | Refills: 0 | Status: ON HOLD | OUTPATIENT
Start: 2019-11-25 | End: 2022-02-03

## 2019-12-16 RX ORDER — ATORVASTATIN CALCIUM 20 MG/1
TABLET, FILM COATED ORAL
Qty: 30 TABLET | Refills: 2 | Status: SHIPPED | OUTPATIENT
Start: 2019-12-16 | End: 2020-04-24

## 2019-12-27 RX ORDER — TRAZODONE HYDROCHLORIDE 150 MG/1
TABLET ORAL
Qty: 30 TABLET | Refills: 1 | Status: SHIPPED | OUTPATIENT
Start: 2019-12-27 | End: 2020-02-24

## 2019-12-27 RX ORDER — TESTOSTERONE CYPIONATE 200 MG/ML
VIAL (ML) INTRAMUSCULAR
Qty: 2 VIAL | Refills: 2 | Status: SHIPPED | OUTPATIENT
Start: 2019-12-27 | End: 2020-03-19

## 2019-12-30 RX ORDER — DULOXETIN HYDROCHLORIDE 60 MG/1
CAPSULE, DELAYED RELEASE ORAL
Qty: 30 CAPSULE | Refills: 10 | Status: SHIPPED | OUTPATIENT
Start: 2019-12-30 | End: 2020-11-24

## 2020-01-21 RX ORDER — AMLODIPINE BESYLATE 2.5 MG/1
TABLET ORAL
Qty: 30 TABLET | Refills: 10 | Status: SHIPPED | OUTPATIENT
Start: 2020-01-21 | End: 2020-12-15

## 2020-01-27 ENCOUNTER — TELEPHONE (OUTPATIENT)
Dept: FAMILY MEDICINE CLINIC | Facility: CLINIC | Age: 60
End: 2020-01-27

## 2020-01-27 NOTE — TELEPHONE ENCOUNTER
Patient left a voice message that he stopped taking his cholesterol medication and the cramping went away.  He started back on it and the cramping came back.  He thinks he needs to be put on a different cholesterol medication.

## 2020-01-27 NOTE — TELEPHONE ENCOUNTER
Tell him I agree  Will try crestor 10mg  1 po q hs   #30   5 refills  Watch for the same side effects

## 2020-01-28 RX ORDER — ROSUVASTATIN CALCIUM 10 MG/1
10 TABLET, COATED ORAL DAILY
Qty: 30 TABLET | Refills: 5 | Status: SHIPPED | OUTPATIENT
Start: 2020-01-28 | End: 2020-07-22

## 2020-02-24 RX ORDER — TRAZODONE HYDROCHLORIDE 150 MG/1
TABLET ORAL
Qty: 30 TABLET | Refills: 0 | Status: SHIPPED | OUTPATIENT
Start: 2020-02-24 | End: 2020-03-23

## 2020-03-19 RX ORDER — TESTOSTERONE CYPIONATE 200 MG/ML
INJECTION, SOLUTION INTRAMUSCULAR
Qty: 10 ML | Refills: 1 | Status: SHIPPED | OUTPATIENT
Start: 2020-03-19 | End: 2020-10-26

## 2020-03-23 RX ORDER — TRAZODONE HYDROCHLORIDE 150 MG/1
TABLET ORAL
Qty: 30 TABLET | Refills: 0 | Status: SHIPPED | OUTPATIENT
Start: 2020-03-23 | End: 2020-04-22

## 2020-04-14 RX ORDER — CITALOPRAM 20 MG/1
TABLET ORAL
Qty: 30 TABLET | Refills: 4 | Status: SHIPPED | OUTPATIENT
Start: 2020-04-14 | End: 2020-09-09

## 2020-04-22 RX ORDER — TRAZODONE HYDROCHLORIDE 150 MG/1
TABLET ORAL
Qty: 30 TABLET | Refills: 0 | Status: SHIPPED | OUTPATIENT
Start: 2020-04-22 | End: 2020-05-19

## 2020-04-24 ENCOUNTER — OFFICE VISIT (OUTPATIENT)
Dept: FAMILY MEDICINE CLINIC | Facility: CLINIC | Age: 60
End: 2020-04-24

## 2020-04-24 DIAGNOSIS — E29.1 HYPOGONADISM IN MALE: ICD-10-CM

## 2020-04-24 DIAGNOSIS — N52.03 COMBINED ARTERIAL INSUFFICIENCY AND CORPORO-VENOUS OCCLUSIVE ERECTILE DYSFUNCTION: ICD-10-CM

## 2020-04-24 DIAGNOSIS — R25.2 LEG CRAMPS: ICD-10-CM

## 2020-04-24 DIAGNOSIS — I10 ESSENTIAL HYPERTENSION: ICD-10-CM

## 2020-04-24 DIAGNOSIS — D64.9 ANEMIA, UNSPECIFIED TYPE: ICD-10-CM

## 2020-04-24 DIAGNOSIS — E53.8 B12 DEFICIENCY: Primary | ICD-10-CM

## 2020-04-24 DIAGNOSIS — E78.00 HYPERCHOLESTEROLEMIA: ICD-10-CM

## 2020-04-24 DIAGNOSIS — R73.9 HYPERGLYCEMIA: ICD-10-CM

## 2020-04-24 LAB
ALBUMIN SERPL-MCNC: 4 G/DL (ref 3.5–5.2)
ALBUMIN/GLOB SERPL: 1.2 G/DL
ALP SERPL-CCNC: 55 U/L (ref 39–117)
ALT SERPL W P-5'-P-CCNC: 25 U/L (ref 1–41)
ANION GAP SERPL CALCULATED.3IONS-SCNC: 11.7 MMOL/L (ref 5–15)
AST SERPL-CCNC: 22 U/L (ref 1–40)
BASOPHILS # BLD AUTO: 0.07 10*3/MM3 (ref 0–0.2)
BASOPHILS NFR BLD AUTO: 1.1 % (ref 0–1.5)
BILIRUB SERPL-MCNC: 0.7 MG/DL (ref 0.2–1.2)
BUN BLD-MCNC: 10 MG/DL (ref 6–20)
BUN/CREAT SERPL: 9.8 (ref 7–25)
CALCIUM SPEC-SCNC: 9.4 MG/DL (ref 8.6–10.5)
CHLORIDE SERPL-SCNC: 97 MMOL/L (ref 98–107)
CHOLEST SERPL-MCNC: 166 MG/DL (ref 0–200)
CO2 SERPL-SCNC: 26.3 MMOL/L (ref 22–29)
CREAT BLD-MCNC: 1.02 MG/DL (ref 0.76–1.27)
DEPRECATED RDW RBC AUTO: 43.2 FL (ref 37–54)
EOSINOPHIL # BLD AUTO: 0.22 10*3/MM3 (ref 0–0.4)
EOSINOPHIL NFR BLD AUTO: 3.3 % (ref 0.3–6.2)
ERYTHROCYTE [DISTWIDTH] IN BLOOD BY AUTOMATED COUNT: 13.1 % (ref 12.3–15.4)
GFR SERPL CREATININE-BSD FRML MDRD: 75 ML/MIN/1.73
GLOBULIN UR ELPH-MCNC: 3.4 GM/DL
GLUCOSE BLD-MCNC: 100 MG/DL (ref 65–99)
HCT VFR BLD AUTO: 48.1 % (ref 37.5–51)
HDLC SERPL-MCNC: 47 MG/DL (ref 40–60)
HGB BLD-MCNC: 16.9 G/DL (ref 13–17.7)
IMM GRANULOCYTES # BLD AUTO: 0.01 10*3/MM3 (ref 0–0.05)
IMM GRANULOCYTES NFR BLD AUTO: 0.2 % (ref 0–0.5)
LDLC SERPL CALC-MCNC: 102 MG/DL (ref 0–100)
LDLC/HDLC SERPL: 2.18 {RATIO}
LYMPHOCYTES # BLD AUTO: 1.15 10*3/MM3 (ref 0.7–3.1)
LYMPHOCYTES NFR BLD AUTO: 17.3 % (ref 19.6–45.3)
MCH RBC QN AUTO: 31.8 PG (ref 26.6–33)
MCHC RBC AUTO-ENTMCNC: 35.1 G/DL (ref 31.5–35.7)
MCV RBC AUTO: 90.6 FL (ref 79–97)
MONOCYTES # BLD AUTO: 0.91 10*3/MM3 (ref 0.1–0.9)
MONOCYTES NFR BLD AUTO: 13.7 % (ref 5–12)
NEUTROPHILS # BLD AUTO: 4.27 10*3/MM3 (ref 1.7–7)
NEUTROPHILS NFR BLD AUTO: 64.4 % (ref 42.7–76)
NRBC BLD AUTO-RTO: 0 /100 WBC (ref 0–0.2)
PLATELET # BLD AUTO: 146 10*3/MM3 (ref 140–450)
PMV BLD AUTO: 12.7 FL (ref 6–12)
POTASSIUM BLD-SCNC: 4.3 MMOL/L (ref 3.5–5.2)
PROT SERPL-MCNC: 7.4 G/DL (ref 6–8.5)
RBC # BLD AUTO: 5.31 10*6/MM3 (ref 4.14–5.8)
SODIUM BLD-SCNC: 135 MMOL/L (ref 136–145)
TESTOST SERPL-MCNC: 412 NG/DL (ref 193–740)
TRIGL SERPL-MCNC: 83 MG/DL (ref 0–150)
VIT B12 BLD-MCNC: 464 PG/ML (ref 211–946)
VLDLC SERPL-MCNC: 16.6 MG/DL (ref 5–40)
WBC NRBC COR # BLD: 6.63 10*3/MM3 (ref 3.4–10.8)

## 2020-04-24 PROCEDURE — 80053 COMPREHEN METABOLIC PANEL: CPT | Performed by: FAMILY MEDICINE

## 2020-04-24 PROCEDURE — 84403 ASSAY OF TOTAL TESTOSTERONE: CPT | Performed by: FAMILY MEDICINE

## 2020-04-24 PROCEDURE — 82607 VITAMIN B-12: CPT | Performed by: FAMILY MEDICINE

## 2020-04-24 PROCEDURE — 99442 PR PHYS/QHP TELEPHONE EVALUATION 11-20 MIN: CPT | Performed by: FAMILY MEDICINE

## 2020-04-24 PROCEDURE — 85025 COMPLETE CBC W/AUTO DIFF WBC: CPT | Performed by: FAMILY MEDICINE

## 2020-04-24 PROCEDURE — 80061 LIPID PANEL: CPT | Performed by: FAMILY MEDICINE

## 2020-04-24 RX ORDER — ATENOLOL 50 MG/1
TABLET ORAL
Qty: 30 TABLET | Refills: 7 | Status: SHIPPED | OUTPATIENT
Start: 2020-04-24 | End: 2021-01-13 | Stop reason: SDUPTHER

## 2020-04-24 RX ORDER — SILDENAFIL 100 MG/1
100 TABLET, FILM COATED ORAL DAILY PRN
Qty: 6 TABLET | Refills: 11 | Status: SHIPPED | OUTPATIENT
Start: 2020-04-24 | End: 2021-04-19 | Stop reason: SDUPTHER

## 2020-04-24 NOTE — PROGRESS NOTES
You have chosen to receive care through a telephone visit. Do you consent to use a telephone visit for your medical care today? Yes    Rooming Tab(CC,VS,Pt Hx,Fall Screen)  Chief Complaint   Patient presents with   • Hyperlipidemia   • Hypogonadism       Subjective Patient is doing a telephone visit today because of the coronavirus.  He states he is bruising a lot and has a history of thrombocytopenia and needs to have his platelet count checked which were going to do today.  He also has a history of anemia with a normal iron and basically negative work-up so we will recheck that today as well.  Patient also has hypogonadism and takes testosterone and his last shot was about 3 weeks ago.  He normally takes 200 mg IM every 2 weeks.  Patient has B12 deficiency and takes B12 at thousand micrograms daily needs to have that rechecked.  Patient has a history of hyperlipidemia and had leg cramping with Lipitor so that was discontinued.  He is tolerating Crestor at this time and needs to have this rechecked.  Patient has erectile dysfunction and would like to try Viagra.  The patient is supposed to have a colonoscopy but he could not get because of COVID-19  Patient has hyperglycemia and is trying to keep his carbohydrate intake low    I have reviewed and updated his medications, medical history and problem list during today's office visit.     Patient Care Team:  Cuong Burrell MD as PCP - General    Problem List Tab  Medications Tab  Synopsis Tab  Chart Review Tab  Care Everywhere Tab  Immunizations Tab  Patient History Tab    Social History     Tobacco Use   • Smoking status: Never Smoker   • Smokeless tobacco: Never Used   Substance Use Topics   • Alcohol use: Yes     Frequency: 2-3 times a week     Drinks per session: 1 or 2     Binge frequency: Monthly       Review of Systems   Constitutional: Negative for chills, fatigue and fever.   HENT: Negative for nosebleeds.    Eyes: Negative for double vision.    Respiratory: Negative for chest tightness and shortness of breath.    Cardiovascular: Negative for chest pain and palpitations.   Gastrointestinal: Negative for blood in stool.   Genitourinary: Negative for dysuria and hematuria.   Neurological: Negative for dizziness and syncope.   Psychiatric/Behavioral: Negative for self-injury, suicidal ideas and depressed mood.       Objective     Rooming Tab(CC,VS,Pt Hx,Fall Screen)  There were no vitals taken for this visit.    There is no height or weight on file to calculate BMI.    Physical Exam     Statin Choice Calculator  Data Reviewed:               Lab Results   Component Value Date    BUN 10 04/24/2020    CREATININE 1.02 04/24/2020    EGFRIFNONA 75 04/24/2020     (L) 04/24/2020    K 4.3 04/24/2020    CL 97 (L) 04/24/2020    CALCIUM 9.4 04/24/2020    ALBUMIN 4.00 04/24/2020    BILITOT 0.7 04/24/2020    ALKPHOS 55 04/24/2020    AST 22 04/24/2020    ALT 25 04/24/2020    TRIG 83 04/24/2020    HDL 47 04/24/2020    VLDL 16.6 04/24/2020     (H) 04/24/2020    LDLHDL 2.18 04/24/2020    WBC 6.63 04/24/2020    RBC 5.31 04/24/2020    HCT 48.1 04/24/2020    MCV 90.6 04/24/2020    MCH 31.8 04/24/2020      Assessment/Plan   Order Review Tab  Health Maintenance Tab  Patient Plan/Order Tab  Diagnoses and all orders for this visit:    1. B12 deficiency (Primary)  -     Vitamin B12    2. Hypercholesterolemia  Assessment & Plan:  Suspect is improved since he started Crestor.  We will plan on rechecking his lipid panel in the near future  Continue diet weight loss and medications    Orders:  -     Lipid Panel  -     Comprehensive Metabolic Panel    3. Essential hypertension  Assessment & Plan:  Hypertension is improving with treatment.  Continue current treatment regimen.  Dietary sodium restriction.  Weight loss.  Regular aerobic exercise.  Continue current medications.  Blood pressure will be reassessed at the next regular appointment.      4. Anemia, unspecified  type  Assessment & Plan:  Stable, will recheck in near future    Orders:  -     CBC & Differential  -     CBC Auto Differential    5. Hypogonadism in male  Assessment & Plan:  Stable, continue testosterone 200 mg IM and will have him come in for a testosterone level in the near future    Orders:  -     Testosterone    6. Leg cramps  Assessment & Plan:  Secondary to Lipitor.  They have improved since he has stopped Lipitor and is taking Crestor      7. Hyperglycemia  Assessment & Plan:  Low-carb diet discussed with the patient, exercise and weight loss.      8. Combined arterial insufficiency and corporo-venous occlusive erectile dysfunction  Assessment & Plan:  Sildenafil 20 mg 1-5 p.o. pre-intercourse      Other orders  -     sildenafil (Viagra) 100 MG tablet; Take 1 tablet by mouth Daily As Needed for Erectile Dysfunction.  Dispense: 6 tablet; Refill: 11      Wrapup Tab  Return for Next scheduled follow up.

## 2020-04-27 NOTE — ASSESSMENT & PLAN NOTE
Stable, continue testosterone 200 mg IM and will have him come in for a testosterone level in the near future

## 2020-04-27 NOTE — ASSESSMENT & PLAN NOTE
Suspect is improved since he started Crestor.  We will plan on rechecking his lipid panel in the near future  Continue diet weight loss and medications

## 2020-05-19 ENCOUNTER — OFFICE (AMBULATORY)
Dept: URBAN - METROPOLITAN AREA PATHOLOGY 4 | Facility: PATHOLOGY | Age: 60
End: 2020-05-19

## 2020-05-19 ENCOUNTER — ON CAMPUS - OUTPATIENT (AMBULATORY)
Dept: URBAN - METROPOLITAN AREA HOSPITAL 2 | Facility: HOSPITAL | Age: 60
End: 2020-05-19
Payer: COMMERCIAL

## 2020-05-19 VITALS
HEART RATE: 57 BPM | DIASTOLIC BLOOD PRESSURE: 71 MMHG | OXYGEN SATURATION: 93 % | DIASTOLIC BLOOD PRESSURE: 62 MMHG | HEIGHT: 71 IN | RESPIRATION RATE: 17 BRPM | OXYGEN SATURATION: 94 % | HEART RATE: 52 BPM | SYSTOLIC BLOOD PRESSURE: 113 MMHG | HEART RATE: 60 BPM | SYSTOLIC BLOOD PRESSURE: 142 MMHG | DIASTOLIC BLOOD PRESSURE: 97 MMHG | WEIGHT: 253.2 LBS | SYSTOLIC BLOOD PRESSURE: 120 MMHG | SYSTOLIC BLOOD PRESSURE: 97 MMHG | OXYGEN SATURATION: 98 % | OXYGEN SATURATION: 99 % | TEMPERATURE: 96.2 F | SYSTOLIC BLOOD PRESSURE: 109 MMHG | HEART RATE: 56 BPM | SYSTOLIC BLOOD PRESSURE: 149 MMHG | DIASTOLIC BLOOD PRESSURE: 68 MMHG | OXYGEN SATURATION: 97 % | RESPIRATION RATE: 16 BRPM | HEART RATE: 51 BPM | DIASTOLIC BLOOD PRESSURE: 85 MMHG | DIASTOLIC BLOOD PRESSURE: 75 MMHG | DIASTOLIC BLOOD PRESSURE: 70 MMHG | DIASTOLIC BLOOD PRESSURE: 78 MMHG | HEART RATE: 55 BPM

## 2020-05-19 DIAGNOSIS — D12.2 BENIGN NEOPLASM OF ASCENDING COLON: ICD-10-CM

## 2020-05-19 DIAGNOSIS — K57.30 DIVERTICULOSIS OF LARGE INTESTINE WITHOUT PERFORATION OR ABS: ICD-10-CM

## 2020-05-19 DIAGNOSIS — Z12.11 ENCOUNTER FOR SCREENING FOR MALIGNANT NEOPLASM OF COLON: ICD-10-CM

## 2020-05-19 DIAGNOSIS — K51.40 INFLAMMATORY POLYPS OF COLON WITHOUT COMPLICATIONS: ICD-10-CM

## 2020-05-19 DIAGNOSIS — D12.3 BENIGN NEOPLASM OF TRANSVERSE COLON: ICD-10-CM

## 2020-05-19 PROBLEM — K63.5 POLYP OF COLON: Status: ACTIVE | Noted: 2020-05-19

## 2020-05-19 LAB
GI HISTOLOGY: A. UNSPECIFIED: (no result)
GI HISTOLOGY: B. UNSPECIFIED: (no result)
GI HISTOLOGY: PDF REPORT: (no result)

## 2020-05-19 PROCEDURE — 88305 TISSUE EXAM BY PATHOLOGIST: CPT | Performed by: INTERNAL MEDICINE

## 2020-05-19 PROCEDURE — 45385 COLONOSCOPY W/LESION REMOVAL: CPT | Mod: 33 | Performed by: INTERNAL MEDICINE

## 2020-05-19 PROCEDURE — 45380 COLONOSCOPY AND BIOPSY: CPT | Mod: 33,59 | Performed by: INTERNAL MEDICINE

## 2020-05-19 RX ORDER — TRAZODONE HYDROCHLORIDE 150 MG/1
TABLET ORAL
Qty: 30 TABLET | Refills: 0 | Status: SHIPPED | OUTPATIENT
Start: 2020-05-19 | End: 2020-06-22

## 2020-05-26 RX ORDER — LISINOPRIL 40 MG/1
TABLET ORAL
Qty: 30 TABLET | Refills: 5 | Status: SHIPPED | OUTPATIENT
Start: 2020-05-26 | End: 2020-11-20

## 2020-06-22 RX ORDER — TRAZODONE HYDROCHLORIDE 150 MG/1
TABLET ORAL
Qty: 30 TABLET | Refills: 0 | Status: SHIPPED | OUTPATIENT
Start: 2020-06-22 | End: 2020-07-20

## 2020-07-20 RX ORDER — TRAZODONE HYDROCHLORIDE 150 MG/1
TABLET ORAL
Qty: 30 TABLET | Refills: 0 | Status: SHIPPED | OUTPATIENT
Start: 2020-07-20 | End: 2020-08-17

## 2020-07-22 RX ORDER — ROSUVASTATIN CALCIUM 10 MG/1
TABLET, COATED ORAL
Qty: 30 TABLET | Refills: 4 | Status: SHIPPED | OUTPATIENT
Start: 2020-07-22 | End: 2021-01-13 | Stop reason: SDUPTHER

## 2020-07-27 ENCOUNTER — TELEPHONE (OUTPATIENT)
Dept: FAMILY MEDICINE CLINIC | Facility: CLINIC | Age: 60
End: 2020-07-27

## 2020-07-27 DIAGNOSIS — R50.9 FEBRILE ILLNESS: Primary | ICD-10-CM

## 2020-07-27 PROCEDURE — U0003 INFECTIOUS AGENT DETECTION BY NUCLEIC ACID (DNA OR RNA); SEVERE ACUTE RESPIRATORY SYNDROME CORONAVIRUS 2 (SARS-COV-2) (CORONAVIRUS DISEASE [COVID-19]), AMPLIFIED PROBE TECHNIQUE, MAKING USE OF HIGH THROUGHPUT TECHNOLOGIES AS DESCRIBED BY CMS-2020-01-R: HCPCS | Performed by: FAMILY MEDICINE

## 2020-07-27 RX ORDER — AZITHROMYCIN 250 MG/1
TABLET, FILM COATED ORAL
Qty: 6 TABLET | Refills: 0 | Status: SHIPPED | OUTPATIENT
Start: 2020-07-27 | End: 2020-10-26

## 2020-07-27 NOTE — TELEPHONE ENCOUNTER
PATIENT CALLED IN AND STATED HE HAS A FEVER AND COUGH  AND MUSCLE ACHES  AND WANTED TO GET TESTED FOR COVID-19 .      PLEASE CALL PATIENT AT  [phone].

## 2020-07-27 NOTE — TELEPHONE ENCOUNTER
I told him to get the testing at the urgent care and he needs to quarantine himself until results are back

## 2020-08-17 RX ORDER — TRAZODONE HYDROCHLORIDE 150 MG/1
TABLET ORAL
Qty: 30 TABLET | Refills: 0 | Status: SHIPPED | OUTPATIENT
Start: 2020-08-17 | End: 2020-09-15

## 2020-09-09 RX ORDER — CITALOPRAM 20 MG/1
TABLET ORAL
Qty: 30 TABLET | Refills: 3 | Status: SHIPPED | OUTPATIENT
Start: 2020-09-09 | End: 2021-01-04

## 2020-09-15 RX ORDER — TRAZODONE HYDROCHLORIDE 150 MG/1
TABLET ORAL
Qty: 30 TABLET | Refills: 0 | Status: SHIPPED | OUTPATIENT
Start: 2020-09-15 | End: 2020-10-12

## 2020-10-12 RX ORDER — TRAZODONE HYDROCHLORIDE 150 MG/1
TABLET ORAL
Qty: 30 TABLET | Refills: 11 | Status: SHIPPED | OUTPATIENT
Start: 2020-10-12 | End: 2021-10-20 | Stop reason: SDUPTHER

## 2020-10-26 ENCOUNTER — LAB (OUTPATIENT)
Dept: FAMILY MEDICINE CLINIC | Facility: CLINIC | Age: 60
End: 2020-10-26

## 2020-10-26 ENCOUNTER — OFFICE VISIT (OUTPATIENT)
Dept: FAMILY MEDICINE CLINIC | Facility: CLINIC | Age: 60
End: 2020-10-26

## 2020-10-26 VITALS
TEMPERATURE: 97.3 F | RESPIRATION RATE: 16 BRPM | HEART RATE: 51 BPM | WEIGHT: 257.4 LBS | BODY MASS INDEX: 36.93 KG/M2 | OXYGEN SATURATION: 100 % | DIASTOLIC BLOOD PRESSURE: 78 MMHG | SYSTOLIC BLOOD PRESSURE: 138 MMHG

## 2020-10-26 DIAGNOSIS — Z90.81 H/O SPLENECTOMY: ICD-10-CM

## 2020-10-26 DIAGNOSIS — I10 ESSENTIAL HYPERTENSION: Primary | ICD-10-CM

## 2020-10-26 DIAGNOSIS — E78.00 HYPERCHOLESTEROLEMIA: ICD-10-CM

## 2020-10-26 DIAGNOSIS — R73.9 HYPERGLYCEMIA: ICD-10-CM

## 2020-10-26 DIAGNOSIS — E29.1 HYPOGONADISM IN MALE: ICD-10-CM

## 2020-10-26 DIAGNOSIS — Z12.5 PROSTATE CANCER SCREENING: ICD-10-CM

## 2020-10-26 DIAGNOSIS — D64.9 ANEMIA, UNSPECIFIED TYPE: ICD-10-CM

## 2020-10-26 PROBLEM — D69.6 THROMBOCYTOPENIA (HCC): Status: ACTIVE | Noted: 2020-10-26

## 2020-10-26 LAB
BASOPHILS # BLD AUTO: 0.08 10*3/MM3 (ref 0–0.2)
BASOPHILS NFR BLD AUTO: 1.6 % (ref 0–1.5)
DEPRECATED RDW RBC AUTO: 47.7 FL (ref 37–54)
EOSINOPHIL # BLD AUTO: 0.12 10*3/MM3 (ref 0–0.4)
EOSINOPHIL NFR BLD AUTO: 2.4 % (ref 0.3–6.2)
ERYTHROCYTE [DISTWIDTH] IN BLOOD BY AUTOMATED COUNT: 14.3 % (ref 12.3–15.4)
GLUCOSE P FAST SERPL-MCNC: 99 MG/DL (ref 74–106)
HCT VFR BLD AUTO: 43.9 % (ref 37.5–51)
HGB BLD-MCNC: 14.8 G/DL (ref 13–17.7)
IMM GRANULOCYTES # BLD AUTO: 0.01 10*3/MM3 (ref 0–0.05)
IMM GRANULOCYTES NFR BLD AUTO: 0.2 % (ref 0–0.5)
LYMPHOCYTES # BLD AUTO: 1.09 10*3/MM3 (ref 0.7–3.1)
LYMPHOCYTES NFR BLD AUTO: 22.2 % (ref 19.6–45.3)
MCH RBC QN AUTO: 30.4 PG (ref 26.6–33)
MCHC RBC AUTO-ENTMCNC: 33.7 G/DL (ref 31.5–35.7)
MCV RBC AUTO: 90.1 FL (ref 79–97)
MONOCYTES # BLD AUTO: 0.65 10*3/MM3 (ref 0.1–0.9)
MONOCYTES NFR BLD AUTO: 13.2 % (ref 5–12)
NEUTROPHILS NFR BLD AUTO: 2.96 10*3/MM3 (ref 1.7–7)
NEUTROPHILS NFR BLD AUTO: 60.4 % (ref 42.7–76)
NRBC BLD AUTO-RTO: 0 /100 WBC (ref 0–0.2)
PLATELET # BLD AUTO: 172 10*3/MM3 (ref 140–450)
PMV BLD AUTO: 12.9 FL (ref 6–12)
PSA SERPL-MCNC: 0.7 NG/ML (ref 0–4)
RBC # BLD AUTO: 4.87 10*6/MM3 (ref 4.14–5.8)
TESTOST SERPL-MCNC: 330 NG/DL (ref 193–740)
WBC # BLD AUTO: 4.91 10*3/MM3 (ref 3.4–10.8)

## 2020-10-26 PROCEDURE — 85025 COMPLETE CBC W/AUTO DIFF WBC: CPT | Performed by: FAMILY MEDICINE

## 2020-10-26 PROCEDURE — 99213 OFFICE O/P EST LOW 20 MIN: CPT | Performed by: FAMILY MEDICINE

## 2020-10-26 PROCEDURE — G0103 PSA SCREENING: HCPCS | Performed by: FAMILY MEDICINE

## 2020-10-26 PROCEDURE — 84403 ASSAY OF TOTAL TESTOSTERONE: CPT | Performed by: FAMILY MEDICINE

## 2020-10-26 PROCEDURE — 82947 ASSAY GLUCOSE BLOOD QUANT: CPT | Performed by: FAMILY MEDICINE

## 2020-10-26 NOTE — PROGRESS NOTES
Rooming Tab(CC,VS,Pt Hx,Fall Screen)  Chief Complaint   Patient presents with   • Hypertension       Subjective 60-year-old here for follow-up of his hypertension.  The patient takes his lisinopril 40 mg daily and his amlodipine 2.5 mg daily as well as atenolol 50 mg and he has no complaints of dizziness or chest pain.  His blood pressure check at a work function was normal.  The patient has hyperlipidemia and brought in his labs from his work wellness exam and his HDL was 112 with an LDL of 64, his blood sugar was 119 and his blood pressure was 130/82.  He was not fasting that day however for his blood sugar.  Patient has been off testosterone because his last level was normal and his insurance company denied it.  He does not have any complaints today.  He is feeling rather well    I have reviewed and updated his medications, medical history and problem list during today's office visit.     Patient Care Team:  Cuong Burrell MD as PCP - General    Problem List Tab  Medications Tab  Synopsis Tab  Chart Review Tab  Care Everywhere Tab  Immunizations Tab  Patient History Tab    Social History     Tobacco Use   • Smoking status: Never Smoker   • Smokeless tobacco: Never Used   Substance Use Topics   • Alcohol use: Yes     Frequency: 2-3 times a week     Drinks per session: 1 or 2     Binge frequency: Monthly       Review of Systems   Constitutional: Negative for chills, fatigue and fever.   HENT: Negative for congestion and nosebleeds.    Eyes: Negative for blurred vision and double vision.   Respiratory: Negative for chest tightness and shortness of breath.    Cardiovascular: Negative for chest pain and palpitations.   Gastrointestinal: Negative for blood in stool.   Endocrine: Negative for polydipsia and polyuria.   Genitourinary: Negative for dysuria and hematuria.   Musculoskeletal: Positive for arthralgias.   Neurological: Negative for dizziness, syncope and confusion.   Psychiatric/Behavioral: Negative for  self-injury, suicidal ideas and depressed mood.       Objective     Rooming Tab(CC,VS,Pt Hx,Fall Screen)  /78 (BP Location: Right arm, Patient Position: Sitting, Cuff Size: Large Adult)   Pulse 51   Temp 97.3 °F (36.3 °C) (Skin)   Resp 16   Wt 117 kg (257 lb 6.4 oz)   SpO2 100%   BMI 36.93 kg/m²     Body mass index is 36.93 kg/m².    Physical Exam  Vitals signs and nursing note reviewed.   Constitutional:       General: He is not in acute distress.     Appearance: He is well-developed. He is obese.      Comments: Pleasant male in no acute distress   HENT:      Head: Normocephalic and atraumatic.      Right Ear: Tympanic membrane and ear canal normal.      Left Ear: Tympanic membrane and ear canal normal.      Nose: Nose normal.      Mouth/Throat:      Mouth: Mucous membranes are moist.      Pharynx: Oropharynx is clear.   Eyes:      General: Lids are normal.      Extraocular Movements: Extraocular movements intact.      Conjunctiva/sclera: Conjunctivae normal.      Pupils: Pupils are equal, round, and reactive to light.   Neck:      Musculoskeletal: Normal range of motion and neck supple.      Thyroid: No thyroid mass or thyromegaly.      Vascular: No carotid bruit or JVD.      Trachea: Trachea normal.      Comments: No carotid bruits  Cardiovascular:      Rate and Rhythm: Normal rate and regular rhythm.      Pulses: Normal pulses.      Heart sounds: Normal heart sounds.   Pulmonary:      Effort: Pulmonary effort is normal.      Breath sounds: Normal breath sounds.   Abdominal:      Comments: Obese   Musculoskeletal:      Comments: No c/c/e  Varicose veins.  DP pulses palpable   Skin:     General: Skin is warm and dry.   Neurological:      General: No focal deficit present.      Mental Status: He is alert and oriented to person, place, and time.      Cranial Nerves: No cranial nerve deficit.   Psychiatric:         Attention and Perception: He is attentive.         Mood and Affect: Mood normal.          Speech: Speech normal.         Behavior: Behavior normal.         Thought Content: Thought content normal.         Judgment: Judgment normal.          Statin Choice Calculator  Data Reviewed:               Lab Results   Component Value Date    WBC 4.91 10/26/2020    RBC 4.87 10/26/2020    HCT 43.9 10/26/2020    MCV 90.1 10/26/2020    MCH 30.4 10/26/2020    PSA 0.700 10/26/2020      Assessment/Plan   Order Review Tab  Health Maintenance Tab  Patient Plan/Order Tab  Diagnoses and all orders for this visit:    1. Essential hypertension (Primary)  Assessment & Plan:  Hypertension is improving with treatment.  Continue current treatment regimen.  Dietary sodium restriction.  Weight loss.  Regular aerobic exercise.  Continue current medications.  Blood pressure will be reassessed at the next regular appointment.      2. Hypogonadism in male  Assessment & Plan:  He is currently off of medication and his last testosterone level is okay so we will recheck it again today.    Orders:  -     Testosterone  -     CBC & Differential  -     CBC Auto Differential    3. Anemia, unspecified type  Assessment & Plan:  Suspect will be improved, recheck CBC today.      4. Hyperglycemia  Assessment & Plan:  Recommend the patient continue with low-carb diet, attempted weight loss and get more exercise.  Recheck a blood sugar today    Orders:  -     Glucose, Fasting    5. Prostate cancer screening  -     PSA Screen    6. Hypercholesterolemia  Assessment & Plan:  Lipid abnormalities are improving with treatment.  Pharmacotherapy as ordered.  Lipids will be reassessed in 6 months.      7. H/O splenectomy  Assessment & Plan:  The patient will be due for pneumococcal 23 and a meningococcal vaccine next year        Wrapup Tab  Return in about 6 months (around 4/26/2021) for Annual physical.

## 2020-10-27 PROBLEM — Z90.81 H/O SPLENECTOMY: Status: ACTIVE | Noted: 2020-10-27

## 2020-10-27 NOTE — ASSESSMENT & PLAN NOTE
Recommend the patient continue with low-carb diet, attempted weight loss and get more exercise.  Recheck a blood sugar today

## 2020-10-27 NOTE — ASSESSMENT & PLAN NOTE
He is currently off of medication and his last testosterone level is okay so we will recheck it again today.

## 2020-11-06 RX ORDER — MELOXICAM 15 MG/1
TABLET ORAL
Qty: 30 TABLET | Refills: 10 | Status: SHIPPED | OUTPATIENT
Start: 2020-11-06 | End: 2022-01-21 | Stop reason: SDUPTHER

## 2020-11-20 RX ORDER — LISINOPRIL 40 MG/1
TABLET ORAL
Qty: 30 TABLET | Refills: 4 | Status: SHIPPED | OUTPATIENT
Start: 2020-11-20 | End: 2021-04-21 | Stop reason: SDUPTHER

## 2020-11-24 RX ORDER — DULOXETIN HYDROCHLORIDE 60 MG/1
CAPSULE, DELAYED RELEASE ORAL
Qty: 30 CAPSULE | Refills: 9 | Status: SHIPPED | OUTPATIENT
Start: 2020-11-24 | End: 2022-01-27

## 2020-12-15 RX ORDER — AMLODIPINE BESYLATE 2.5 MG/1
TABLET ORAL
Qty: 30 TABLET | Refills: 9 | Status: SHIPPED | OUTPATIENT
Start: 2020-12-15 | End: 2021-10-20 | Stop reason: SDUPTHER

## 2021-01-04 RX ORDER — CITALOPRAM 20 MG/1
TABLET ORAL
Qty: 90 TABLET | Refills: 0 | Status: SHIPPED | OUTPATIENT
Start: 2021-01-04 | End: 2021-04-07 | Stop reason: SDUPTHER

## 2021-01-13 ENCOUNTER — OFFICE VISIT (OUTPATIENT)
Dept: FAMILY MEDICINE CLINIC | Facility: CLINIC | Age: 61
End: 2021-01-13

## 2021-01-13 DIAGNOSIS — R91.1 LUNG NODULE: ICD-10-CM

## 2021-01-13 DIAGNOSIS — M19.011 PRIMARY OSTEOARTHRITIS OF RIGHT SHOULDER: Primary | ICD-10-CM

## 2021-01-13 PROCEDURE — 99213 OFFICE O/P EST LOW 20 MIN: CPT | Performed by: FAMILY MEDICINE

## 2021-01-13 RX ORDER — ATENOLOL 50 MG/1
50 TABLET ORAL DAILY
Qty: 30 TABLET | Refills: 11 | Status: SHIPPED | OUTPATIENT
Start: 2021-01-13 | End: 2022-01-13 | Stop reason: SDUPTHER

## 2021-01-13 RX ORDER — ROSUVASTATIN CALCIUM 10 MG/1
10 TABLET, COATED ORAL DAILY
Qty: 30 TABLET | Refills: 11 | Status: SHIPPED | OUTPATIENT
Start: 2021-01-13 | End: 2022-01-13 | Stop reason: SDUPTHER

## 2021-01-13 NOTE — PROGRESS NOTES
Rooming Tab(CC,VS,Pt Hx,Fall Screen)  Chief Complaint   Patient presents with   • Pre-op Exam       Subjective Patient is here for clearance for his shoulder surgery.  He did undergo right shoulder replacement secondary to his arthritis.  He has no known cardiac history.  He has no lung history.  He has no complaints of chest pain or dizziness or syncope with exertion.  He does have hypertension and hyperlipidemia and these are being treated appropriately and well controlled.  He had a CT calcium score of 0 recently.  I did have a 7 mm right upper lung nodule noted on his CT which will be rechecked.    I have reviewed and updated his medications, medical history and problem list during today's office visit.     Patient Care Team:  Cuong Burrell MD as PCP - General    Problem List Tab  Medications Tab  Synopsis Tab  Chart Review Tab  Care Everywhere Tab  Immunizations Tab  Patient History Tab    Social History     Tobacco Use   • Smoking status: Never Smoker   • Smokeless tobacco: Never Used   Substance Use Topics   • Alcohol use: Yes     Frequency: 2-3 times a week     Drinks per session: 1 or 2     Binge frequency: Monthly       Review of Systems   Constitutional: Negative for chills, fatigue and fever.   HENT: Negative for nosebleeds.    Eyes: Negative for double vision.   Respiratory: Negative for chest tightness and shortness of breath.    Cardiovascular: Negative for chest pain and palpitations.   Gastrointestinal: Negative for blood in stool.   Genitourinary: Negative for dysuria and hematuria.   Musculoskeletal:        Shoulder pain   Neurological: Negative for dizziness and syncope.   Psychiatric/Behavioral: Negative for depressed mood.       Objective     Rooming Tab(CC,VS,Pt Hx,Fall Screen)  /82   Pulse 82   Temp 97.1 °F (36.2 °C)   Resp 12   Wt 123 kg (270 lb 12.8 oz)   BMI 38.86 kg/m²     Body mass index is 38.86 kg/m².    Physical Exam  Vitals signs and nursing note reviewed.    Constitutional:       General: He is not in acute distress.     Appearance: He is well-developed. He is obese.      Comments: Obese pleasant male no acute distress   HENT:      Head: Normocephalic and atraumatic.      Right Ear: Tympanic membrane and ear canal normal.      Left Ear: Tympanic membrane and ear canal normal.      Nose: Nose normal.      Mouth/Throat:      Mouth: Mucous membranes are moist.      Pharynx: Oropharynx is clear.   Eyes:      General: Lids are normal.      Extraocular Movements: Extraocular movements intact.      Conjunctiva/sclera: Conjunctivae normal.      Pupils: Pupils are equal, round, and reactive to light.   Neck:      Musculoskeletal: Normal range of motion and neck supple.      Thyroid: No thyroid mass or thyromegaly.      Vascular: No carotid bruit or JVD.      Trachea: Trachea normal.      Comments: No carotid bruits  Cardiovascular:      Rate and Rhythm: Normal rate and regular rhythm.      Pulses: Normal pulses.      Heart sounds: Normal heart sounds.   Pulmonary:      Effort: Pulmonary effort is normal.      Breath sounds: Normal breath sounds.   Abdominal:      General: Bowel sounds are normal.      Palpations: Abdomen is soft.      Comments: Base   Musculoskeletal:      Comments: No c/c/e  Decreased range of motion of right shoulder   Skin:     General: Skin is warm and dry.   Neurological:      General: No focal deficit present.      Mental Status: He is alert and oriented to person, place, and time.      Cranial Nerves: No cranial nerve deficit.   Psychiatric:         Attention and Perception: He is attentive.         Mood and Affect: Mood normal.         Speech: Speech normal.         Behavior: Behavior normal.         Thought Content: Thought content normal.         Judgment: Judgment normal.          Statin Choice Calculator  Data Reviewed:               Lab Results   Component Value Date    WBC 4.91 10/26/2020    RBC 4.87 10/26/2020    HCT 43.9 10/26/2020    MCV 90.1  10/26/2020    MCH 30.4 10/26/2020    PSA 0.700 10/26/2020      Assessment/Plan   Order Review Tab  Health Maintenance Tab  Patient Plan/Order Tab  Diagnoses and all orders for this visit:    1. Primary osteoarthritis of right shoulder (Primary)  Assessment & Plan:  Patient is cleared for surgery and general anesthesia.      2. Lung nodule  Assessment & Plan:  Repeat CT scan.  Doubt is anything significant    Orders:  -     CT Chest Without Contrast; Future    Other orders  -     atenolol (TENORMIN) 50 MG tablet; Take 1 tablet by mouth Daily.  Dispense: 30 tablet; Refill: 11  -     rosuvastatin (CRESTOR) 10 MG tablet; Take 1 tablet by mouth Daily.  Dispense: 30 tablet; Refill: 11      Wrapup Tab  Return if symptoms worsen or fail to improve, for Next scheduled follow up.

## 2021-01-15 ENCOUNTER — TELEPHONE (OUTPATIENT)
Dept: FAMILY MEDICINE CLINIC | Facility: CLINIC | Age: 61
End: 2021-01-15

## 2021-01-15 VITALS
SYSTOLIC BLOOD PRESSURE: 120 MMHG | HEART RATE: 82 BPM | BODY MASS INDEX: 38.86 KG/M2 | WEIGHT: 270.8 LBS | RESPIRATION RATE: 12 BRPM | TEMPERATURE: 97.1 F | DIASTOLIC BLOOD PRESSURE: 82 MMHG

## 2021-01-15 PROBLEM — M19.011 PRIMARY OSTEOARTHRITIS OF RIGHT SHOULDER: Status: ACTIVE | Noted: 2021-01-15

## 2021-01-15 PROBLEM — R91.1 LUNG NODULE: Status: ACTIVE | Noted: 2021-01-15

## 2021-01-15 NOTE — TELEPHONE ENCOUNTER
Dear Dr. Hammonds,    I am writing this letter in reference to our mutual patient Ty banuelos.  He was seen on January 13, 2021 and cleared for surgery and general anesthesia.  For any questions please feel free to contact my office.                                           Sincerely,                                           Cuong Burrell MD

## 2021-03-02 ENCOUNTER — TREATMENT (OUTPATIENT)
Dept: PHYSICAL THERAPY | Facility: CLINIC | Age: 61
End: 2021-03-02

## 2021-03-02 DIAGNOSIS — Z96.611 S/P REVERSE TOTAL SHOULDER ARTHROPLASTY, RIGHT: Primary | ICD-10-CM

## 2021-03-02 PROCEDURE — 97161 PT EVAL LOW COMPLEX 20 MIN: CPT | Performed by: PHYSICAL THERAPIST

## 2021-03-02 PROCEDURE — 97110 THERAPEUTIC EXERCISES: CPT | Performed by: PHYSICAL THERAPIST

## 2021-03-02 PROCEDURE — 97140 MANUAL THERAPY 1/> REGIONS: CPT | Performed by: PHYSICAL THERAPIST

## 2021-03-02 NOTE — PROGRESS NOTES
Physical Therapy Initial Evaluation and Plan of Care    Patient: Ty Bergeron   : 1960  Diagnosis/ICD-10 Code:  S/P reverse total shoulder arthroplasty, right [Z96.611]  Referring practitioner: Jere Hammonds MD  Date of Initial Visit: 3/2/2021  Today's Date: 3/2/2021  Patient seen for 1 sessions           Subjective Questionnaire: quick DASH 34%      Subjective 59 yo male s/p R reverse TSA 21. Pt with long history of shoulder pain. No complications per pt, out of immobilizer. Primarily limited by ROM deficits per pt. 6/10 pain at worst, 0/10 now. Symptoms worsen with R SL, reaching. Symptoms improve with rest. Denies numbness/tingling, swelling, or further UE symptoms. Pt had HH PT which went well.  PT DC'd pt last Thursday.  MD follow up: 3 weeks  Social hx: Works as a , going back to work Monday. No restrictions per pt.      Objective          Active Range of Motion   Left Shoulder   Normal active range of motion    Right Shoulder   Flexion: 70 degrees   Abduction: 40 degrees   External rotation 0°: 40 degrees   External rotation BTH: Active external rotation behind the head: nil.     Passive Range of Motion     Right Shoulder   Flexion: 140 degrees   Abduction: 90 degrees   External rotation 0°: 50 degrees   Internal rotation 45°: 40 degrees      Guarded with PROM. Incisions healing well.  Further testing deferred due to post op.       Assessment & Plan     Assessment  Impairments: abnormal coordination, abnormal or restricted ROM, activity intolerance, impaired physical strength, lacks appropriate home exercise program and pain with function  Assessment details: 59 yo male s/p R reverse TSA 21. Pt is with a good response to treatment this date. His ROM is relatively poor at this point. Recommend continuing with PT evaluation and treatment to address his ROM, then progress to functional strength activities as appropriate.  Prognosis: good  Functional Limitations: carrying  objects, lifting, sleeping, pulling, pushing, uncomfortable because of pain, reaching behind back, reaching overhead and unable to perform repetitive tasks  Goals  Plan Goals: Short term goals, 1 week: Tolerate HEP progression.  Voice compliance with activity modification.  Report improvement in symptoms.    LTGs, 5 weeks: Improve quick DASH score to 10%.  AROM to be at or near wfl.  4+/5 to 4/5 strength throughout.  Return to work as a  at or near prior level.  Independent with HEP.    Plan  Therapy options: will be seen for skilled physical therapy services  Other planned modality interventions: modalities prn  Planned therapy interventions: flexibility, functional ROM exercises, home exercise program, manual therapy, neuromuscular re-education, strengthening, stretching and therapeutic activities  Frequency: 1-2 times per week.  Duration in weeks: 8  Treatment plan discussed with: patient      Timed:         Manual Therapy:    8     mins  56684;     Therapeutic Exercise:    15     mins  25411;     Neuromuscular Nereyda:        mins  08989;    Therapeutic Activity:          mins  08347;     Gait Training:           mins  76048;     Ultrasound:          mins  98100;    Ionto                                   mins   57288  Self Care                            mins   31825    Un-Timed:  Electrical Stimulation:         mins  42718 ( );  Traction          mins 04870  Low Eval     15     Mins  16422  Mod Eval          Mins  81819  High Eval                            Mins  41411  Re-Eval                               mins  36735        Timed Treatment:   23   mins   Total Treatment:     38   mins    PT SIGNATURE: Dylan Hammer PT, DPT, OCS  [place] license: [id number]  DATE TREATMENT INITIATED: 3/2/2021    Initial Certification  Certification Period: 5/31/2021  I certify that the therapy services are furnished while this patient is under my care.  The services outlined above are required for this patient and will  be reviewed every 90 days.     PHYSICIAN: _____________________________    Jere Hammonds MD      DATE:     Please sign and return via fax to [phone]. Thank you, [facility].

## 2021-03-04 ENCOUNTER — TREATMENT (OUTPATIENT)
Dept: PHYSICAL THERAPY | Facility: CLINIC | Age: 61
End: 2021-03-04

## 2021-03-04 DIAGNOSIS — Z96.611 S/P REVERSE TOTAL SHOULDER ARTHROPLASTY, RIGHT: Primary | ICD-10-CM

## 2021-03-04 PROCEDURE — 97140 MANUAL THERAPY 1/> REGIONS: CPT | Performed by: PHYSICAL THERAPIST

## 2021-03-04 PROCEDURE — 97110 THERAPEUTIC EXERCISES: CPT | Performed by: PHYSICAL THERAPIST

## 2021-03-04 NOTE — PROGRESS NOTES
Physical Therapy Daily Progress Note      Patient: Ty Bergeron   : 1960  Diagnosis/ICD-10 Code:  S/P reverse total shoulder arthroplasty, right [Z96.611]  Referring practitioner: No ref. provider found  Date of Initial Visit: Type: THERAPY  Noted: 3/2/2021  Today's Date: 3/4/2021  Patient seen for 2 sessions             Subjective Pt has no significant changes to report today.    Objective   See Exercise, Manual, and Modality Logs for complete treatment.       Assessment/Plan  Functional ROM and strength are still lacking.  Good tolerance with progression of exercises and with manual techniques overall.    Progress per Plan of Care           Timed:         Manual Therapy:    15     mins  86788;     Therapeutic Exercise:    23    mins  08407;         Timed Treatment:   38   mins   Total Treatment:     38   mins    Kang Rucker PTA  Physical Therapist Assistant

## 2021-03-08 ENCOUNTER — TREATMENT (OUTPATIENT)
Dept: PHYSICAL THERAPY | Facility: CLINIC | Age: 61
End: 2021-03-08

## 2021-03-08 DIAGNOSIS — Z96.611 S/P REVERSE TOTAL SHOULDER ARTHROPLASTY, RIGHT: Primary | ICD-10-CM

## 2021-03-08 PROCEDURE — 97140 MANUAL THERAPY 1/> REGIONS: CPT | Performed by: PHYSICAL THERAPIST

## 2021-03-08 PROCEDURE — 97110 THERAPEUTIC EXERCISES: CPT | Performed by: PHYSICAL THERAPIST

## 2021-03-08 NOTE — PROGRESS NOTES
Physical Therapy Daily Progress Note      Patient: Ty Bergeron   : 1960  Diagnosis/ICD-10 Code:  S/P reverse total shoulder arthroplasty, right [Z96.611]  Referring practitioner: Jere Hammonds MD  Date of Initial Visit: Type: THERAPY  Noted: 3/2/2021  Today's Date: 3/8/2021  Patient seen for 3 sessions             Subjective Pt reports that he returned to work with somewhat light duty restrictions.    Objective AROM: flexion-80 degrees  See Exercise, Manual, and Modality Logs for complete treatment.       Assessment/Plan  Good tolerance with progression of exercises.  Functional ROM and strength still present with deficits.  AROM increased 10 degrees from initial eval.    Progress per Plan of Care           Timed:         Manual Therapy:    10     mins  70157;     Therapeutic Exercise:    30     mins  25516;         Timed Treatment:   40   mins   Total Treatment:     40   mins    Kang Rucker PTA  Physical Therapist Assistant

## 2021-03-11 ENCOUNTER — TREATMENT (OUTPATIENT)
Dept: PHYSICAL THERAPY | Facility: CLINIC | Age: 61
End: 2021-03-11

## 2021-03-11 DIAGNOSIS — Z96.611 S/P REVERSE TOTAL SHOULDER ARTHROPLASTY, RIGHT: Primary | ICD-10-CM

## 2021-03-11 PROCEDURE — 97112 NEUROMUSCULAR REEDUCATION: CPT | Performed by: PHYSICAL THERAPIST

## 2021-03-11 PROCEDURE — 97110 THERAPEUTIC EXERCISES: CPT | Performed by: PHYSICAL THERAPIST

## 2021-03-11 NOTE — PROGRESS NOTES
Physical Therapy Daily Progress Note  Ty Bergeron   1960   Primary Diagnosis: S/P reverse total shoulder arthroplasty, right [Z96.611]   Type: THERAPY  Noted: 3/2/2021   3/11/2021   Visits: 4       Subjective   Pt reports: Pt returned to work. Having difficulty and pain with overhead activities. HEP going well.      Objective AROM flexion ~95 deg today    See Exercise, Manual, and Modality Logs for complete treatment.     Patient Education: HEP    Assessment/Plan ROM improved vs IE date. Will continue with AROM/AAROM activities as tolerated.    Plan of care and goals reviewed and are still appropriate for this patient.  Progress per Plan of Care            Timed:         Manual Therapy:         mins  18057;     Therapeutic Exercise:    25     mins  05498;     Neuromuscular Nereyda:    8    mins  79487;    Therapeutic Activity:          mins  89724;     Gait Training:           mins  36036;     Ultrasound:          mins  30101;    Ionto                                   mins   96389  Self Care                            mins   20720    Un-Timed:  Electrical Stimulation:         mins  86103 ( );  Traction          mins 94028  Low Eval          Mins  01708  Mod Eval          Mins  64450  High Eval                            Mins  62428  Re-Eval                               mins  75575    Timed Treatment:   33   mins   Total Treatment:     33   mins    Dylan Hammer, PT, DPT, OCS  [place] license: [id number]  Physical Therapist

## 2021-03-15 ENCOUNTER — TREATMENT (OUTPATIENT)
Dept: PHYSICAL THERAPY | Facility: CLINIC | Age: 61
End: 2021-03-15

## 2021-03-15 DIAGNOSIS — Z96.611 S/P REVERSE TOTAL SHOULDER ARTHROPLASTY, RIGHT: Primary | ICD-10-CM

## 2021-03-15 PROCEDURE — 97112 NEUROMUSCULAR REEDUCATION: CPT | Performed by: PHYSICAL THERAPIST

## 2021-03-15 PROCEDURE — 97110 THERAPEUTIC EXERCISES: CPT | Performed by: PHYSICAL THERAPIST

## 2021-03-15 NOTE — PROGRESS NOTES
"     Physical Therapy Daily Progress Note      Patient: Ty Bergeron   : 1960  Diagnosis/ICD-10 Code:  S/P reverse total shoulder arthroplasty, right [Z96.611]  Referring practitioner: Jere Hammonds MD  Date of Initial Visit: Type: THERAPY  Noted: 3/2/2021  Today's Date: 3/15/2021  Patient seen for 5 sessions             Subjective Pt reports that he can raise his arm up a little more now and thinks the \"stuff that he's doing at work\" has helped this.    Objective   See Exercise, Manual, and Modality Logs for complete treatment.       Assessment/Plan  ROM is still lacking full range.  Good tolerance with progression of exercise intensity.    Progress per Plan of Care           Timed:         Therapeutic Exercise:    25     mins  84945;     Neuromuscular Nereyda:    8    mins  94575;        Timed Treatment:   33   mins   Total Treatment:     33   mins    Kang Rucker PTA  Physical Therapist Assistant      "

## 2021-03-18 ENCOUNTER — TREATMENT (OUTPATIENT)
Dept: PHYSICAL THERAPY | Facility: CLINIC | Age: 61
End: 2021-03-18

## 2021-03-18 DIAGNOSIS — Z96.611 S/P REVERSE TOTAL SHOULDER ARTHROPLASTY, RIGHT: Primary | ICD-10-CM

## 2021-03-18 PROCEDURE — 97112 NEUROMUSCULAR REEDUCATION: CPT | Performed by: PHYSICAL THERAPIST

## 2021-03-18 PROCEDURE — 97110 THERAPEUTIC EXERCISES: CPT | Performed by: PHYSICAL THERAPIST

## 2021-03-18 NOTE — PROGRESS NOTES
Physical Therapy Daily Progress Note  Ty Bergeron   1960   Primary Diagnosis: S/P reverse total shoulder arthroplasty, right [Z96.611]   Type: THERAPY  Noted: 3/2/2021   3/18/2021   Visits: 6       Subjective   Pt reports: No new complaints. Having difficulty with overhead reaching activities at work. HEP going well.      Objective     See Exercise, Manual, and Modality Logs for complete treatment.     Patient Education: HEP    Assessment/Plan Progressing strengthening, tolerated well.      Progress per Plan of Care            Timed:         Manual Therapy:         mins  42108;     Therapeutic Exercise:    30     mins  07143;     Neuromuscular Nereyda:    15    mins  70324;    Therapeutic Activity:          mins  59227;     Gait Training:           mins  83133;     Ultrasound:          mins  17208;    Ionto                                   mins   65109  Self Care                            mins   67281    Un-Timed:  Electrical Stimulation:         mins  64285 ( );  Traction          mins 74075  Low Eval          Mins  32236  Mod Eval          Mins  39756  High Eval                            Mins  27039  Re-Eval                               mins  38598    Timed Treatment:   45   mins   Total Treatment:     45   mins    Dylan Hammer, PT, DPT, OCS  [place] license: [id number]  Physical Therapist

## 2021-03-22 ENCOUNTER — TREATMENT (OUTPATIENT)
Dept: PHYSICAL THERAPY | Facility: CLINIC | Age: 61
End: 2021-03-22

## 2021-03-22 DIAGNOSIS — Z96.611 S/P REVERSE TOTAL SHOULDER ARTHROPLASTY, RIGHT: Primary | ICD-10-CM

## 2021-03-22 PROCEDURE — 97112 NEUROMUSCULAR REEDUCATION: CPT | Performed by: PHYSICAL THERAPIST

## 2021-03-22 PROCEDURE — 97110 THERAPEUTIC EXERCISES: CPT | Performed by: PHYSICAL THERAPIST

## 2021-03-22 NOTE — PROGRESS NOTES
Physical Therapy Daily Progress Note      Patient: Ty Bergeron   : 1960  Diagnosis/ICD-10 Code:  S/P reverse total shoulder arthroplasty, right [Z96.611]  Referring practitioner: Jere Hammonds MD  Date of Initial Visit: Type: THERAPY  Noted: 3/2/2021  Today's Date: 3/22/2021  Patient seen for 7 sessions             Subjective Still having difficulty with overhead reaching at work.  Objective   See Exercise, Manual, and Modality Logs for complete treatment.       Assessment/Plan Pt has a lot of substitution with SL abduction, SL horizontal abduction and wall slides that needs tactile cues to keep right UT from working too much.    Progress per Plan of Care           Timed:         Therapeutic Exercise:    25     mins  10975;     Neuromuscular Nereyda:    15    mins  00234;          Timed Treatment:   40   mins   Total Treatment:     40   mins    Kang Rucker PTA  Physical Therapist Assistant

## 2021-03-25 ENCOUNTER — TREATMENT (OUTPATIENT)
Dept: PHYSICAL THERAPY | Facility: CLINIC | Age: 61
End: 2021-03-25

## 2021-03-25 DIAGNOSIS — Z96.611 S/P REVERSE TOTAL SHOULDER ARTHROPLASTY, RIGHT: Primary | ICD-10-CM

## 2021-03-25 PROCEDURE — 97110 THERAPEUTIC EXERCISES: CPT | Performed by: PHYSICAL THERAPIST

## 2021-03-25 PROCEDURE — 97112 NEUROMUSCULAR REEDUCATION: CPT | Performed by: PHYSICAL THERAPIST

## 2021-03-25 NOTE — PROGRESS NOTES
Physical Therapy Daily Progress Note  Ty Bergeron   1960   Primary Diagnosis: S/P reverse total shoulder arthroplasty, right [Z96.611]   Type: THERAPY  Noted: 3/2/2021   3/25/2021   Visits: 8       Subjective   Pt reports: Still with difficulty with reaching activities at work. HEP going well.      Objective     See Exercise, Manual, and Modality Logs for complete treatment.     Patient Education: HEP    Assessment/Plan AROM grossly limited throughout. Will continue with functional activities as tolerated.      Progress per Plan of Care            Timed:         Manual Therapy:         mins  17708;     Therapeutic Exercise:    15     mins  12104;     Neuromuscular Nereyda:    30    mins  44197;    Therapeutic Activity:          mins  04404;     Gait Training:           mins  40286;     Ultrasound:          mins  23466;    Ionto                                   mins   34896  Self Care                            mins   46944    Un-Timed:  Electrical Stimulation:         mins  47265 ( );  Traction          mins 03967  Low Eval          Mins  97539  Mod Eval          Mins  89270  High Eval                            Mins  93538  Re-Eval                               mins  59954    Timed Treatment:   45   mins   Total Treatment:     45   mins    Dylan Hammer, PT, DPT, OCS  [place] license: [id number]  Physical Therapist

## 2021-03-29 ENCOUNTER — TREATMENT (OUTPATIENT)
Dept: PHYSICAL THERAPY | Facility: CLINIC | Age: 61
End: 2021-03-29

## 2021-03-29 DIAGNOSIS — Z96.611 S/P REVERSE TOTAL SHOULDER ARTHROPLASTY, RIGHT: Primary | ICD-10-CM

## 2021-03-29 PROCEDURE — 97110 THERAPEUTIC EXERCISES: CPT | Performed by: PHYSICAL THERAPIST

## 2021-03-29 PROCEDURE — 97112 NEUROMUSCULAR REEDUCATION: CPT | Performed by: PHYSICAL THERAPIST

## 2021-03-29 NOTE — PROGRESS NOTES
Physical Therapy Daily Progress Note      Patient: Ty Bergeron   : 1960  Diagnosis/ICD-10 Code:  S/P reverse total shoulder arthroplasty, right [Z96.611]  Referring practitioner: Jere Hammonds MD  Date of Initial Visit: Type: THERAPY  Noted: 3/2/2021  Today's Date: 3/29/2021  Patient seen for 9 sessions             Subjective Pt says he is trying to use his right UE more at work but has been utilizing his left UE more often than usual.    Objective   See Exercise, Manual, and Modality Logs for complete treatment.       Assessment/Plan  Strength deficits still notable.  He needs verbal and tactile cues to correct R UT substitution with t-band wall walks and other strengthening exercises.    Progress per Plan of Care           Timed:         Therapeutic Exercise:    15     mins  38649;     Neuromuscular Nereyda:    23    mins  20351;        Timed Treatment:   38   mins   Total Treatment:     38   mins    Kang Rucker PTA  Physical Therapist Assistant

## 2021-04-05 ENCOUNTER — TREATMENT (OUTPATIENT)
Dept: PHYSICAL THERAPY | Facility: CLINIC | Age: 61
End: 2021-04-05

## 2021-04-05 DIAGNOSIS — Z96.611 S/P REVERSE TOTAL SHOULDER ARTHROPLASTY, RIGHT: Primary | ICD-10-CM

## 2021-04-05 PROCEDURE — 97112 NEUROMUSCULAR REEDUCATION: CPT | Performed by: PHYSICAL THERAPIST

## 2021-04-05 PROCEDURE — 97110 THERAPEUTIC EXERCISES: CPT | Performed by: PHYSICAL THERAPIST

## 2021-04-05 NOTE — PROGRESS NOTES
Physical Therapy Daily Progress Note      Patient: Ty Bergeron   : 1960  Diagnosis/ICD-10 Code:  S/P reverse total shoulder arthroplasty, right [Z96.611]  Referring practitioner: Jere Hammonds MD  Date of Initial Visit: Type: THERAPY  Noted: 3/2/2021  Today's Date: 2021  Patient seen for 10 sessions             Subjective Shoulder is sore today from being active over the weekend with a .    Objective   See Exercise, Manual, and Modality Logs for complete treatment.       Assessment/Plan  Pt substitutes with his right shoulder still with most activities overhead.  Good tolerance with progression of reps.  Fatigued at the end    Progress per Plan of Care           Timed:         Therapeutic Exercise:    15    mins  77278;     Neuromuscular Nereyda:    20   mins  28652;        Timed Treatment:   35   mins   Total Treatment:     35   mins    Kang Rucker PTA  Physical Therapist Assistant

## 2021-04-07 RX ORDER — CITALOPRAM 20 MG/1
20 TABLET ORAL DAILY
Qty: 90 TABLET | Refills: 0 | Status: SHIPPED | OUTPATIENT
Start: 2021-04-07 | End: 2021-07-05

## 2021-04-08 ENCOUNTER — TREATMENT (OUTPATIENT)
Dept: PHYSICAL THERAPY | Facility: CLINIC | Age: 61
End: 2021-04-08

## 2021-04-08 DIAGNOSIS — Z96.611 S/P REVERSE TOTAL SHOULDER ARTHROPLASTY, RIGHT: Primary | ICD-10-CM

## 2021-04-08 PROCEDURE — 97110 THERAPEUTIC EXERCISES: CPT | Performed by: PHYSICAL THERAPIST

## 2021-04-08 PROCEDURE — 97112 NEUROMUSCULAR REEDUCATION: CPT | Performed by: PHYSICAL THERAPIST

## 2021-04-08 NOTE — PROGRESS NOTES
Physical Therapy Daily Progress Note      Patient: Ty Bergeron   : 1960  Diagnosis/ICD-10 Code:  S/P reverse total shoulder arthroplasty, right [Z96.611]  Referring practitioner: Jere Hammonds MD  Date of Initial Visit: Type: THERAPY  Noted: 3/2/2021  Today's Date: 2021  Patient seen for 11 sessions             Subjective Nothing new to report today.  Overall, feels like he is getting better.    Objective   See Exercise, Manual, and Modality Logs for complete treatment.       Assessment/Plan  Functional strength and AROM deficits are still present but slowly improving.    Progress per Plan of Care           Timed:         Therapeutic Exercise:   15    mins  50032;     Neuromuscular Nereyda:    30    mins  72573;          Timed Treatment:   45   mins   Total Treatment:     45   mins    Kang Rucker PTA  Physical Therapist Assistant

## 2021-04-12 ENCOUNTER — TREATMENT (OUTPATIENT)
Dept: PHYSICAL THERAPY | Facility: CLINIC | Age: 61
End: 2021-04-12

## 2021-04-12 DIAGNOSIS — Z96.611 S/P REVERSE TOTAL SHOULDER ARTHROPLASTY, RIGHT: Primary | ICD-10-CM

## 2021-04-12 PROCEDURE — 97110 THERAPEUTIC EXERCISES: CPT | Performed by: PHYSICAL THERAPIST

## 2021-04-12 PROCEDURE — 97112 NEUROMUSCULAR REEDUCATION: CPT | Performed by: PHYSICAL THERAPIST

## 2021-04-12 NOTE — PROGRESS NOTES
Physical Therapy Daily Progress Note      Patient: Ty Bergeron   : 1960  Diagnosis/ICD-10 Code:  S/P reverse total shoulder arthroplasty, right [Z96.611]  Referring practitioner: Jere Hammonds MD  Date of Initial Visit: Type: THERAPY  Noted: 3/2/2021  Today's Date: 2021  Patient seen for 12 sessions             Subjective Doing ok today.  Nothing new to report.    Objective   See Exercise, Manual, and Modality Logs for complete treatment.       Assessment/Plan  Good tolerance with exercises overall.  Pt did have c/o fatigue at the end of exercises today but overall tolerated session well.  He still has issues with shoulder compensation especially with raising arm overhead.    Progress per Plan of Care           Timed:         Therapeutic Exercise:    15     mins  81461;     Neuromuscular Nereyda:    30    mins  88946;        Timed Treatment:   45   mins   Total Treatment:     45   mins    Kang Rucker PTA  Physical Therapist Assistant

## 2021-04-15 ENCOUNTER — TREATMENT (OUTPATIENT)
Dept: PHYSICAL THERAPY | Facility: CLINIC | Age: 61
End: 2021-04-15

## 2021-04-15 DIAGNOSIS — Z96.611 S/P REVERSE TOTAL SHOULDER ARTHROPLASTY, RIGHT: Primary | ICD-10-CM

## 2021-04-15 PROCEDURE — 97110 THERAPEUTIC EXERCISES: CPT | Performed by: PHYSICAL THERAPIST

## 2021-04-15 PROCEDURE — 97112 NEUROMUSCULAR REEDUCATION: CPT | Performed by: PHYSICAL THERAPIST

## 2021-04-15 NOTE — PROGRESS NOTES
Re-Assessment / Re-Certification        Patient: Ty Bergeron   : 1960  Diagnosis/ICD-10 Code:  S/P reverse total shoulder arthroplasty, right [Z96.611]  Referring practitioner: Jere Hammonds MD  Date of Initial Visit: Type: THERAPY  Noted: 3/2/2021  Today's Date: 4/15/2021  Patient seen for 13 sessions      Subjective:     Subjective Questionnaire: QuickDASH: 34%  Clinical Progress: unchanged  Home Program Compliance: Yes  Treatment has included: therapeutic exercise, neuromuscular re-education and manual therapy    Subjective Pt is still having difficulty with overhead activities. Having episodes of 6/10 pain but this is improving in frequency.  Objective          Strength/Myotome Testing     Right Shoulder     Planes of Motion   Flexion: 3   Abduction: 3-   External rotation at 0°: 3+   Internal rotation at 0°: 3+       Active Range of Motion   Left Shoulder   Normal active range of motion    Right Shoulder   Flexion: 90 degrees   Abduction: 85 degrees   External rotation 0°: 40 degrees   External rotation BTH: Active external rotation behind the head: nil.      Passive Range of Motion      Right Shoulder   Flexion: 150 degrees   Abduction: 120 degrees   External rotation 0°: 50 degrees   Internal rotation 45°: 40 degrees    Assessment/Plan   Pt has made some improvement in his ROM and frequency of pain. However, he has not made as much progress as expected in general. Recommend continuing with PT evaluation and treatment until his next MD follow up.    Short term goals, 1 week: Tolerate HEP progression. met  Voice compliance with activity modification. met  Report improvement in symptoms. met    LTGs, 5 weeks: Improve quick DASH score to 10%. Not met  AROM to be at or near wfl. Not met  4+/5 to 4/5 strength throughout. Not met  Return to work as a  at or near prior level. Not met  Independent with HEP. Not met     Recommendations: Continue as planned  Twice weekly for up to 6  weeks.  Prognosis to achieve goals: nghia Hammer, PT, DPT, OCS  [place] license: [id number]  Physical Therapist      Based upon review of the patient's progress and continued therapy plan, it is my medical opinion that Ty Bergeron should continue physical therapy treatment at [facility]  [address] [place] [address] [phone].    Provider: _____________________________    Jere Hammonds MD     Timed:         Manual Therapy:         mins  14628;     Therapeutic Exercise:    15     mins  13081;     Neuromuscular Nereyda:    25    mins  37303;    Therapeutic Activity:          mins  30787;     Gait Training:          mins  72453;     Ultrasound:          mins  87968;    Ionto                                   mins   16372  Self Care                            mins   85597    Un-Timed:  Electrical Stimulation:         mins  68294 ( );  Traction          mins 03913  Low Eval          Mins  63245  Mod Eval          Mins  25640  High Eval                            Mins  29213  Re-Eval                               mins  85380      Timed Treatment:   40   mins   Total Treatment:     40   mins

## 2021-04-19 ENCOUNTER — TREATMENT (OUTPATIENT)
Dept: PHYSICAL THERAPY | Facility: CLINIC | Age: 61
End: 2021-04-19

## 2021-04-19 DIAGNOSIS — Z96.611 S/P REVERSE TOTAL SHOULDER ARTHROPLASTY, RIGHT: Primary | ICD-10-CM

## 2021-04-19 PROCEDURE — 97112 NEUROMUSCULAR REEDUCATION: CPT | Performed by: PHYSICAL THERAPIST

## 2021-04-19 PROCEDURE — 97110 THERAPEUTIC EXERCISES: CPT | Performed by: PHYSICAL THERAPIST

## 2021-04-19 RX ORDER — SILDENAFIL 100 MG/1
100 TABLET, FILM COATED ORAL DAILY PRN
Qty: 15 TABLET | Refills: 2 | Status: SHIPPED | OUTPATIENT
Start: 2021-04-19 | End: 2021-06-07

## 2021-04-19 NOTE — PROGRESS NOTES
Physical Therapy Daily Progress Note      Patient: Ty Bergeron   : 1960  Diagnosis/ICD-10 Code:  S/P reverse total shoulder arthroplasty, right [Z96.611]  Referring practitioner: Jere Hammonds MD  Date of Initial Visit: Type: THERAPY  Noted: 3/2/2021  Today's Date: 2021  Patient seen for 14 sessions             Subjective No changes since last visit.    Objective   See Exercise, Manual, and Modality Logs for complete treatment.       Assessment/Plan  Fatigue was noted at end of treatment with progression of exercise resistance, especially with increased resistance of inclined dumbbell bench press.    Progress per Plan of Care           Timed:              Therapeutic Exercise:    15    mins  15406;     Neuromuscular Nereyda:    20    mins  17855;      Timed Treatment:   35   mins   Total Treatment:     35   mins    Kang Rucker PTA  Physical Therapist Assistant

## 2021-04-21 RX ORDER — LISINOPRIL 40 MG/1
40 TABLET ORAL DAILY
Qty: 90 TABLET | Refills: 1 | Status: SHIPPED | OUTPATIENT
Start: 2021-04-21 | End: 2021-10-19

## 2021-04-22 ENCOUNTER — TREATMENT (OUTPATIENT)
Dept: PHYSICAL THERAPY | Facility: CLINIC | Age: 61
End: 2021-04-22

## 2021-04-22 DIAGNOSIS — Z96.611 S/P REVERSE TOTAL SHOULDER ARTHROPLASTY, RIGHT: Primary | ICD-10-CM

## 2021-04-22 PROCEDURE — 97110 THERAPEUTIC EXERCISES: CPT | Performed by: PHYSICAL THERAPIST

## 2021-04-22 PROCEDURE — 97112 NEUROMUSCULAR REEDUCATION: CPT | Performed by: PHYSICAL THERAPIST

## 2021-04-22 NOTE — PROGRESS NOTES
Physical Therapy Daily Progress Note  Ty Bergeron   1960   Primary Diagnosis: S/P reverse total shoulder arthroplasty, right [Z96.611]   Type: THERAPY  Noted: 3/2/2021   4/22/2021   Visits: 15       Subjective   Pt reports: No new complaints vs last visit. HEP going well.      Objective     See Exercise, Manual, and Modality Logs for complete treatment.     Patient Education: HEP    Assessment/Plan Significant scapular compensation with elevation.       Progress per Plan of Care            Timed:         Manual Therapy:         mins  05335;     Therapeutic Exercise:    25     mins  06352;     Neuromuscular Nereyda:    15    mins  34199;    Therapeutic Activity:          mins  76524;     Gait Training:           mins  87887;     Ultrasound:          mins  40114;    Ionto                                   mins   59659  Self Care                            mins   52453    Un-Timed:  Electrical Stimulation:         mins  67311 ( );  Traction          mins 19809  Low Eval          Mins  34662  Mod Eval          Mins  00938  High Eval                            Mins  55137  Re-Eval                               mins  79830    Timed Treatment:   40   mins   Total Treatment:     40   mins    Dylan Hammer, PT, DPT, OCS  [place] license: [id number]  Physical Therapist

## 2021-04-26 ENCOUNTER — TREATMENT (OUTPATIENT)
Dept: PHYSICAL THERAPY | Facility: CLINIC | Age: 61
End: 2021-04-26

## 2021-04-26 DIAGNOSIS — Z96.611 S/P REVERSE TOTAL SHOULDER ARTHROPLASTY, RIGHT: Primary | ICD-10-CM

## 2021-04-26 PROCEDURE — 97110 THERAPEUTIC EXERCISES: CPT | Performed by: PHYSICAL THERAPIST

## 2021-04-26 PROCEDURE — 97112 NEUROMUSCULAR REEDUCATION: CPT | Performed by: PHYSICAL THERAPIST

## 2021-04-26 NOTE — PROGRESS NOTES
Physical Therapy Daily Progress Note      Patient: Ty Bergeron   : 1960  Diagnosis/ICD-10 Code:  S/P reverse total shoulder arthroplasty, right [Z96.611]  Referring practitioner: Jere Hammonds MD  Date of Initial Visit: Type: THERAPY  Noted: 3/2/2021  Today's Date: 2021  Patient seen for 16 sessions             Subjective Nothing new to report today.    Objective   See Exercise, Manual, and Modality Logs for complete treatment.       Assessment/Plan  Fatigued by end of treatment today with progression of resistance exercises.    Progress per Plan of Care           Timed:              Therapeutic Exercise:    25     mins  35461;     Neuromuscular Nereyda:    15    mins  24314;        Timed Treatment:   40   mins   Total Treatment:     40   mins    Kang Rucker PTA  Physical Therapist Assistant

## 2021-04-29 ENCOUNTER — TREATMENT (OUTPATIENT)
Dept: PHYSICAL THERAPY | Facility: CLINIC | Age: 61
End: 2021-04-29

## 2021-04-29 DIAGNOSIS — Z96.611 S/P REVERSE TOTAL SHOULDER ARTHROPLASTY, RIGHT: Primary | ICD-10-CM

## 2021-04-29 PROCEDURE — 97110 THERAPEUTIC EXERCISES: CPT | Performed by: PHYSICAL THERAPIST

## 2021-04-29 PROCEDURE — 97112 NEUROMUSCULAR REEDUCATION: CPT | Performed by: PHYSICAL THERAPIST

## 2021-04-29 NOTE — PROGRESS NOTES
Re-Assessment / Re-Certification        Patient: Ty Bergeron   : 1960  Diagnosis/ICD-10 Code:  S/P reverse total shoulder arthroplasty, right [Z96.611]  Referring practitioner: Jere Hammonds MD  Date of Initial Visit: Type: THERAPY  Noted: 3/2/2021  Today's Date: 2021  Patient seen for 17 sessions      Subjective:     Subjective Questionnaire: QuickDASH: 20%  Clinical Progress: mixed  Home Program Compliance: Yes  Treatment has included: therapeutic exercise, neuromuscular re-education and manual therapy    Subjective Pt continues to have significant difficulty with overhead reaching activities, which limits ability to perform multiple tasks at work. He has episodes of 5/10 but this is improving in frequency.  Objective   Strength/Myotome Testing     Right Shoulder      Planes of Motion   Flexion: 3   Abduction: 3-   External rotation at 0°: 4-   Internal rotation at 0°: 4-       Active Range of Motion   Right Shoulder   Flexion: 90 degrees   Abduction: 85 degrees   External rotation 0°: 40 degrees   External rotation BTH: Active external rotation behind the head: nil.      Passive Range of Motion      Right Shoulder   Flexion: 150 degrees   Abduction: 120 degrees   External rotation 0°: 50 degrees   Internal rotation 45°: 40 degrees    Assessment/Plan   Pt is not making progress re: his AROM, which is very limited with overhead reaching. His outcome measure score, pain symptoms, and IR/ER strength have improved somewhat vs last progress note. Pt's potential to make further improvement is unknown due to his lack of improvement re: overhead AROM.    Short term goals, 1 week: Tolerate HEP progression. met  Voice compliance with activity modification. met  Report improvement in symptoms. met    LTGs, 5 weeks: Improve quick DASH score to 10%. Not met  AROM to be at or near wfl. Not met  4+/5 to 4/5 strength throughout. Not met  Return to work as a  at or near prior level. Not  met  Independent with HEP. Not met     Will continue 1-2 times per week pending MD follow up.    Dylan Hammer, PT, DPT, OCS  [place] license: [id number]  Physical Therapist      Based upon review of the patient's progress and continued therapy plan, it is my medical opinion that Ty Bergeron should continue physical therapy treatment at [facility]  [address] [place] [address] [phone].    Provider: _____________________________    Jere Hammonds MD     Timed:         Manual Therapy:         mins  04025;     Therapeutic Exercise:    23     mins  73563;     Neuromuscular Nereyda:    15    mins  09985;    Therapeutic Activity:          mins  76023;     Gait Training:           mins  46695;     Ultrasound:          mins  39250;    Ionto                                   mins   73566  Self Care                            mins   66836    Un-Timed:  Electrical Stimulation:         mins  23885 ( );  Traction          mins 26579  Low Eval          Mins  76826  Mod Eval          Mins  09668  High Eval                            Mins  47649  Re-Eval                               mins  29288      Timed Treatment:   38   mins   Total Treatment:     38   mins

## 2021-06-07 RX ORDER — SILDENAFIL 100 MG/1
TABLET, FILM COATED ORAL
Qty: 15 TABLET | Refills: 1 | Status: SHIPPED | OUTPATIENT
Start: 2021-06-07 | End: 2021-07-19

## 2021-07-05 RX ORDER — CITALOPRAM 20 MG/1
TABLET ORAL
Qty: 90 TABLET | Refills: 0 | Status: SHIPPED | OUTPATIENT
Start: 2021-07-05 | End: 2021-10-04

## 2021-07-19 RX ORDER — SILDENAFIL 100 MG/1
TABLET, FILM COATED ORAL
Qty: 15 TABLET | Refills: 0 | Status: SHIPPED | OUTPATIENT
Start: 2021-07-19 | End: 2021-08-03

## 2021-07-21 ENCOUNTER — OFFICE VISIT (OUTPATIENT)
Dept: FAMILY MEDICINE CLINIC | Facility: CLINIC | Age: 61
End: 2021-07-21

## 2021-07-21 ENCOUNTER — LAB (OUTPATIENT)
Dept: FAMILY MEDICINE CLINIC | Facility: CLINIC | Age: 61
End: 2021-07-21

## 2021-07-21 VITALS
RESPIRATION RATE: 16 BRPM | HEIGHT: 71 IN | DIASTOLIC BLOOD PRESSURE: 66 MMHG | BODY MASS INDEX: 38.64 KG/M2 | SYSTOLIC BLOOD PRESSURE: 106 MMHG | HEART RATE: 58 BPM | OXYGEN SATURATION: 96 % | WEIGHT: 276 LBS

## 2021-07-21 DIAGNOSIS — I10 ESSENTIAL HYPERTENSION: ICD-10-CM

## 2021-07-21 DIAGNOSIS — E29.1 HYPOGONADISM IN MALE: ICD-10-CM

## 2021-07-21 DIAGNOSIS — E78.00 HYPERCHOLESTEROLEMIA: ICD-10-CM

## 2021-07-21 DIAGNOSIS — Z90.81 H/O SPLENECTOMY: ICD-10-CM

## 2021-07-21 DIAGNOSIS — Z11.59 ENCOUNTER FOR HEPATITIS C SCREENING TEST FOR LOW RISK PATIENT: ICD-10-CM

## 2021-07-21 DIAGNOSIS — Z00.00 ENCOUNTER FOR ANNUAL PHYSICAL EXAM: Primary | ICD-10-CM

## 2021-07-21 LAB
ALBUMIN SERPL-MCNC: 3.9 G/DL (ref 3.5–5.2)
ALBUMIN/GLOB SERPL: 1.7 G/DL
ALP SERPL-CCNC: 61 U/L (ref 39–117)
ALT SERPL W P-5'-P-CCNC: 23 U/L (ref 1–41)
ANION GAP SERPL CALCULATED.3IONS-SCNC: 8.4 MMOL/L (ref 5–15)
AST SERPL-CCNC: 23 U/L (ref 1–40)
BASOPHILS # BLD AUTO: 0.06 10*3/MM3 (ref 0–0.2)
BASOPHILS NFR BLD AUTO: 1.2 % (ref 0–1.5)
BILIRUB SERPL-MCNC: 0.4 MG/DL (ref 0–1.2)
BUN SERPL-MCNC: 13 MG/DL (ref 8–23)
BUN/CREAT SERPL: 12.3 (ref 7–25)
CALCIUM SPEC-SCNC: 8.9 MG/DL (ref 8.6–10.5)
CHLORIDE SERPL-SCNC: 102 MMOL/L (ref 98–107)
CHOLEST SERPL-MCNC: 164 MG/DL (ref 0–200)
CO2 SERPL-SCNC: 25.6 MMOL/L (ref 22–29)
CREAT SERPL-MCNC: 1.06 MG/DL (ref 0.76–1.27)
DEPRECATED RDW RBC AUTO: 44 FL (ref 37–54)
EOSINOPHIL # BLD AUTO: 0.13 10*3/MM3 (ref 0–0.4)
EOSINOPHIL NFR BLD AUTO: 2.7 % (ref 0.3–6.2)
ERYTHROCYTE [DISTWIDTH] IN BLOOD BY AUTOMATED COUNT: 13.8 % (ref 12.3–15.4)
GFR SERPL CREATININE-BSD FRML MDRD: 71 ML/MIN/1.73
GLOBULIN UR ELPH-MCNC: 2.3 GM/DL
GLUCOSE SERPL-MCNC: 99 MG/DL (ref 65–99)
HCT VFR BLD AUTO: 37.5 % (ref 37.5–51)
HCV AB SER DONR QL: NORMAL
HDLC SERPL-MCNC: 85 MG/DL (ref 40–60)
HGB BLD-MCNC: 12.7 G/DL (ref 13–17.7)
IMM GRANULOCYTES # BLD AUTO: 0.02 10*3/MM3 (ref 0–0.05)
IMM GRANULOCYTES NFR BLD AUTO: 0.4 % (ref 0–0.5)
LDLC SERPL CALC-MCNC: 68 MG/DL (ref 0–100)
LDLC/HDLC SERPL: 0.8 {RATIO}
LYMPHOCYTES # BLD AUTO: 0.8 10*3/MM3 (ref 0.7–3.1)
LYMPHOCYTES NFR BLD AUTO: 16.6 % (ref 19.6–45.3)
MCH RBC QN AUTO: 30 PG (ref 26.6–33)
MCHC RBC AUTO-ENTMCNC: 33.9 G/DL (ref 31.5–35.7)
MCV RBC AUTO: 88.4 FL (ref 79–97)
MONOCYTES # BLD AUTO: 0.65 10*3/MM3 (ref 0.1–0.9)
MONOCYTES NFR BLD AUTO: 13.5 % (ref 5–12)
NEUTROPHILS NFR BLD AUTO: 3.16 10*3/MM3 (ref 1.7–7)
NEUTROPHILS NFR BLD AUTO: 65.6 % (ref 42.7–76)
NRBC BLD AUTO-RTO: 0.2 /100 WBC (ref 0–0.2)
PLATELET # BLD AUTO: 136 10*3/MM3 (ref 140–450)
PMV BLD AUTO: 12.5 FL (ref 6–12)
POTASSIUM SERPL-SCNC: 4.6 MMOL/L (ref 3.5–5.2)
PROT SERPL-MCNC: 6.2 G/DL (ref 6–8.5)
RBC # BLD AUTO: 4.24 10*6/MM3 (ref 4.14–5.8)
SODIUM SERPL-SCNC: 136 MMOL/L (ref 136–145)
TRIGL SERPL-MCNC: 53 MG/DL (ref 0–150)
VLDLC SERPL-MCNC: 11 MG/DL (ref 5–40)
WBC # BLD AUTO: 4.82 10*3/MM3 (ref 3.4–10.8)

## 2021-07-21 PROCEDURE — 84403 ASSAY OF TOTAL TESTOSTERONE: CPT | Performed by: NURSE PRACTITIONER

## 2021-07-21 PROCEDURE — 80061 LIPID PANEL: CPT | Performed by: NURSE PRACTITIONER

## 2021-07-21 PROCEDURE — 36415 COLL VENOUS BLD VENIPUNCTURE: CPT

## 2021-07-21 PROCEDURE — 84402 ASSAY OF FREE TESTOSTERONE: CPT | Performed by: NURSE PRACTITIONER

## 2021-07-21 PROCEDURE — 85025 COMPLETE CBC W/AUTO DIFF WBC: CPT | Performed by: NURSE PRACTITIONER

## 2021-07-21 PROCEDURE — 80053 COMPREHEN METABOLIC PANEL: CPT | Performed by: NURSE PRACTITIONER

## 2021-07-21 PROCEDURE — 86803 HEPATITIS C AB TEST: CPT | Performed by: NURSE PRACTITIONER

## 2021-07-21 PROCEDURE — 99396 PREV VISIT EST AGE 40-64: CPT | Performed by: NURSE PRACTITIONER

## 2021-07-21 NOTE — PROGRESS NOTES
"Chief Complaint  Hypertension and Annual Exam    Subjective          Ty Bergeron presents to [facility]  History of Present Illness  Here today for annual exam, follow up HTN  Denies any new problems or concerns  Regular bowel habits, no problems or concerns  Is UTD on colonoscopy, next due 2025  Up once per night to urinate, no problems with urination  psa in October 2020 was wnl  Knees are hurting from arthritis which is limiting his ability to exercise - sees Dr. Hammonds, gets gel shots in the knees - is hoping to have knee replacement surgery at some point, tells me not much relief with shots    Admits that his diet could use some work, gets plan based proteins, encouraged healthy well balance diet low in animal fats, high in fiber    Did get covid vaccine, pfizer x 2 doses 3/13/21 & 4/3/21    Review of Systems   Constitutional: Negative.    HENT: Negative.    Respiratory: Negative.    Gastrointestinal: Negative.    Genitourinary: Negative.    Musculoskeletal: Positive for arthralgias.   Neurological: Negative.    Psychiatric/Behavioral: Negative.        bp is well controlled on current medicine  Denies any chest pain, soa, ha, dizziness, weakness  Does have some pedal edema when he is up on his feet all day at work    Objective   Vital Signs:   /66   Pulse 58   Resp 16   Ht 180.3 cm (71\")   Wt 125 kg (276 lb)   SpO2 96%   BMI 38.49 kg/m²     Physical Exam  Vitals reviewed.   Constitutional:       Appearance: Normal appearance. He is well-developed and well-groomed.   HENT:      Head: Normocephalic.   Eyes:      Extraocular Movements: Extraocular movements intact.      Pupils: Pupils are equal, round, and reactive to light.   Neck:      Vascular: No carotid bruit.   Cardiovascular:      Rate and Rhythm: Normal rate and regular rhythm.      Pulses: Normal pulses.      Heart sounds: Normal heart sounds.   Pulmonary:      Effort: Pulmonary effort is normal.      " Breath sounds: Normal breath sounds.   Abdominal:      General: Bowel sounds are normal.      Palpations: Abdomen is soft.   Musculoskeletal:         General: Normal range of motion.      Cervical back: Normal range of motion.      Right lower leg: No edema.      Left lower leg: No edema.   Skin:     General: Skin is warm.      Capillary Refill: Capillary refill takes less than 2 seconds.   Neurological:      General: No focal deficit present.      Mental Status: He is alert and oriented to person, place, and time.   Psychiatric:         Mood and Affect: Mood normal.        Result Review :         CBC    CBC 10/26/20 7/21/21   WBC 4.91 4.82   RBC 4.87 4.24   Hemoglobin 14.8 12.7 (A)   Hematocrit 43.9 37.5   MCV 90.1 88.4   MCH 30.4 30.0   MCHC 33.7 33.9   RDW 14.3 13.8   Platelets 172 136 (A)   (A) Abnormal value                  PSA    PSA 10/26/20   PSA 0.700                     Assessment and Plan    Diagnoses and all orders for this visit:    1. Encounter for annual physical exam (Primary)    2. Essential hypertension  -     Comprehensive metabolic panel; Future    3. Hypercholesterolemia  -     Lipid panel; Future    4. Hypogonadism in male  -     Testosterone (Free & Total), LC / MS; Future    5. H/O splenectomy  -     CBC w AUTO Differential; Future    6. Encounter for hepatitis C screening test for low risk patient  -     Hepatitis C antibody; Future        Follow Up   Return in about 6 months (around 1/21/2022) for Recheck.  Patient was given instructions and counseling regarding his condition or for health maintenance advice. Please see specific information pulled into the AVS if appropriate.

## 2021-07-25 LAB
TESTOST FREE SERPL-MCNC: 6.6 PG/ML (ref 6.6–18.1)
TESTOST SERPL-MCNC: 336.8 NG/DL (ref 264–916)

## 2021-08-03 RX ORDER — SILDENAFIL 100 MG/1
TABLET, FILM COATED ORAL
Qty: 15 TABLET | Refills: 0 | Status: SHIPPED | OUTPATIENT
Start: 2021-08-03 | End: 2021-10-04

## 2021-10-04 RX ORDER — CITALOPRAM 20 MG/1
TABLET ORAL
Qty: 90 TABLET | Refills: 2 | Status: ON HOLD | OUTPATIENT
Start: 2021-10-04 | End: 2022-02-03

## 2021-10-04 RX ORDER — SILDENAFIL 100 MG/1
TABLET, FILM COATED ORAL
Qty: 6 TABLET | Refills: 2 | Status: SHIPPED | OUTPATIENT
Start: 2021-10-04 | End: 2022-01-10

## 2021-10-19 RX ORDER — LISINOPRIL 40 MG/1
TABLET ORAL
Qty: 90 TABLET | Refills: 1 | Status: ON HOLD | OUTPATIENT
Start: 2021-10-19 | End: 2022-02-03

## 2021-10-20 RX ORDER — TRAZODONE HYDROCHLORIDE 150 MG/1
150 TABLET ORAL
Qty: 90 TABLET | Refills: 1 | Status: SHIPPED | OUTPATIENT
Start: 2021-10-20 | End: 2022-04-19

## 2021-10-20 RX ORDER — AMLODIPINE BESYLATE 2.5 MG/1
2.5 TABLET ORAL DAILY
Qty: 90 TABLET | Refills: 3 | Status: SHIPPED | OUTPATIENT
Start: 2021-10-20 | End: 2022-07-21 | Stop reason: SDUPTHER

## 2022-01-10 RX ORDER — SILDENAFIL 100 MG/1
TABLET, FILM COATED ORAL
Qty: 10 TABLET | Refills: 2 | Status: ON HOLD | OUTPATIENT
Start: 2022-01-10 | End: 2022-02-03

## 2022-01-13 RX ORDER — ATENOLOL 50 MG/1
50 TABLET ORAL DAILY
Qty: 90 TABLET | Refills: 1 | Status: SHIPPED | OUTPATIENT
Start: 2022-01-13 | End: 2022-07-11

## 2022-01-13 RX ORDER — ROSUVASTATIN CALCIUM 10 MG/1
10 TABLET, COATED ORAL DAILY
Qty: 90 TABLET | Refills: 1 | Status: SHIPPED | OUTPATIENT
Start: 2022-01-13 | End: 2022-07-11

## 2022-01-21 ENCOUNTER — OFFICE VISIT (OUTPATIENT)
Dept: FAMILY MEDICINE CLINIC | Facility: CLINIC | Age: 62
End: 2022-01-21

## 2022-01-21 ENCOUNTER — LAB (OUTPATIENT)
Dept: FAMILY MEDICINE CLINIC | Facility: CLINIC | Age: 62
End: 2022-01-21

## 2022-01-21 VITALS
HEIGHT: 71 IN | OXYGEN SATURATION: 99 % | DIASTOLIC BLOOD PRESSURE: 84 MMHG | WEIGHT: 288 LBS | BODY MASS INDEX: 40.32 KG/M2 | SYSTOLIC BLOOD PRESSURE: 122 MMHG | TEMPERATURE: 97.3 F | HEART RATE: 71 BPM | RESPIRATION RATE: 20 BRPM

## 2022-01-21 DIAGNOSIS — E29.1 HYPOGONADISM IN MALE: ICD-10-CM

## 2022-01-21 DIAGNOSIS — I10 ESSENTIAL HYPERTENSION: Primary | ICD-10-CM

## 2022-01-21 DIAGNOSIS — R07.9 CHEST PAIN, UNSPECIFIED TYPE: ICD-10-CM

## 2022-01-21 DIAGNOSIS — M19.011 PRIMARY OSTEOARTHRITIS OF RIGHT SHOULDER: ICD-10-CM

## 2022-01-21 DIAGNOSIS — I10 ESSENTIAL HYPERTENSION: ICD-10-CM

## 2022-01-21 DIAGNOSIS — D64.9 ANEMIA, UNSPECIFIED TYPE: ICD-10-CM

## 2022-01-21 LAB
ALBUMIN SERPL-MCNC: 4 G/DL (ref 3.5–5.2)
ALBUMIN/GLOB SERPL: 1.5 G/DL
ALP SERPL-CCNC: 59 U/L (ref 39–117)
ALT SERPL W P-5'-P-CCNC: 24 U/L (ref 1–41)
ANION GAP SERPL CALCULATED.3IONS-SCNC: 11 MMOL/L (ref 5–15)
AST SERPL-CCNC: 27 U/L (ref 1–40)
BASOPHILS # BLD AUTO: 0.1 10*3/MM3 (ref 0–0.2)
BASOPHILS NFR BLD AUTO: 1.9 % (ref 0–1.5)
BILIRUB SERPL-MCNC: 0.4 MG/DL (ref 0–1.2)
BUN SERPL-MCNC: 12 MG/DL (ref 8–23)
BUN/CREAT SERPL: 13 (ref 7–25)
CALCIUM SPEC-SCNC: 9.1 MG/DL (ref 8.6–10.5)
CHLORIDE SERPL-SCNC: 102 MMOL/L (ref 98–107)
CO2 SERPL-SCNC: 27 MMOL/L (ref 22–29)
CREAT SERPL-MCNC: 0.92 MG/DL (ref 0.76–1.27)
DEPRECATED RDW RBC AUTO: 47.5 FL (ref 37–54)
EOSINOPHIL # BLD AUTO: 0.25 10*3/MM3 (ref 0–0.4)
EOSINOPHIL NFR BLD AUTO: 4.7 % (ref 0.3–6.2)
ERYTHROCYTE [DISTWIDTH] IN BLOOD BY AUTOMATED COUNT: 13.9 % (ref 12.3–15.4)
GFR SERPL CREATININE-BSD FRML MDRD: 84 ML/MIN/1.73
GLOBULIN UR ELPH-MCNC: 2.7 GM/DL
GLUCOSE SERPL-MCNC: 97 MG/DL (ref 65–99)
HCT VFR BLD AUTO: 40.4 % (ref 37.5–51)
HGB BLD-MCNC: 13.4 G/DL (ref 13–17.7)
IMM GRANULOCYTES # BLD AUTO: 0.02 10*3/MM3 (ref 0–0.05)
IMM GRANULOCYTES NFR BLD AUTO: 0.4 % (ref 0–0.5)
LYMPHOCYTES # BLD AUTO: 0.97 10*3/MM3 (ref 0.7–3.1)
LYMPHOCYTES NFR BLD AUTO: 18.3 % (ref 19.6–45.3)
MCH RBC QN AUTO: 31 PG (ref 26.6–33)
MCHC RBC AUTO-ENTMCNC: 33.2 G/DL (ref 31.5–35.7)
MCV RBC AUTO: 93.5 FL (ref 79–97)
MONOCYTES # BLD AUTO: 0.72 10*3/MM3 (ref 0.1–0.9)
MONOCYTES NFR BLD AUTO: 13.6 % (ref 5–12)
NEUTROPHILS NFR BLD AUTO: 3.23 10*3/MM3 (ref 1.7–7)
NEUTROPHILS NFR BLD AUTO: 61.1 % (ref 42.7–76)
NRBC BLD AUTO-RTO: 0.2 /100 WBC (ref 0–0.2)
PLATELET # BLD AUTO: 156 10*3/MM3 (ref 140–450)
PMV BLD AUTO: 12.2 FL (ref 6–12)
POTASSIUM SERPL-SCNC: 4.9 MMOL/L (ref 3.5–5.2)
PROT SERPL-MCNC: 6.7 G/DL (ref 6–8.5)
RBC # BLD AUTO: 4.32 10*6/MM3 (ref 4.14–5.8)
SODIUM SERPL-SCNC: 140 MMOL/L (ref 136–145)
WBC NRBC COR # BLD: 5.29 10*3/MM3 (ref 3.4–10.8)

## 2022-01-21 PROCEDURE — 84402 ASSAY OF FREE TESTOSTERONE: CPT | Performed by: NURSE PRACTITIONER

## 2022-01-21 PROCEDURE — 84403 ASSAY OF TOTAL TESTOSTERONE: CPT | Performed by: NURSE PRACTITIONER

## 2022-01-21 PROCEDURE — 99214 OFFICE O/P EST MOD 30 MIN: CPT | Performed by: NURSE PRACTITIONER

## 2022-01-21 PROCEDURE — 36415 COLL VENOUS BLD VENIPUNCTURE: CPT

## 2022-01-21 PROCEDURE — 80053 COMPREHEN METABOLIC PANEL: CPT | Performed by: NURSE PRACTITIONER

## 2022-01-21 PROCEDURE — 85025 COMPLETE CBC W/AUTO DIFF WBC: CPT | Performed by: NURSE PRACTITIONER

## 2022-01-21 RX ORDER — MELOXICAM 15 MG/1
15 TABLET ORAL DAILY
Qty: 30 TABLET | Refills: 3 | Status: ON HOLD | OUTPATIENT
Start: 2022-01-21 | End: 2022-02-03

## 2022-01-21 NOTE — PROGRESS NOTES
"Chief Complaint  Hypertension (6 mo follow-up), Hyperlipidemia, and Hypogonadism    Subjective          Ty Bergeron presents to [facility]  History of Present Illness     Here today for follow up HTN  Has not been taking the cymbalta for about a couple of months and so far he is not having any trouble being off of it.  He believes that the insurance stopped coverage, but it appears that the prescription ran out as it was last filled in 11/2020 by Dr. Ashanti pñea try to continue off of this med and let us know if he would like to resume taking it.    Initial BP today is 128/86  Repeat 122/84  He does not check this at home  He admits that he has gained some weight and that he is not able to exercise much due to chronic knee pain  Tries to eat a healthy diet, admits that he probably eats too many sandwiches    Denies any c/o shortness of breath, weakness, edema, ha, dizziness  has had a couple of episodes of brief, sharp left sided chest pain, last occurred a couple of weeks ago  Denies any associated symptoms of diaphoresis, nausea, feeling ill, the pain did not radiate  Brother with h/o CAD who was a smoker    Would refer for treadmill stress test but feel he will be unable to tolerate the treadmill with his h/o oa of knees, refer to cardiology    Review of Systems   Respiratory: Negative for cough and shortness of breath.    Cardiovascular: Positive for chest pain. Negative for palpitations and leg swelling.   Musculoskeletal: Positive for arthralgias.   Neurological: Negative for dizziness, weakness and headaches.   All other systems reviewed and are negative.      Objective   Vital Signs:   /84 (BP Location: Left arm, Patient Position: Sitting)   Pulse 71   Temp 97.3 °F (36.3 °C) (Temporal)   Resp 20   Ht 180.3 cm (71\")   Wt 131 kg (288 lb)   SpO2 99%   BMI 40.17 kg/m²     Physical Exam  Vitals reviewed.   Constitutional:       Appearance: Normal appearance. " He is well-developed and well-groomed.   HENT:      Head: Normocephalic.   Neck:      Vascular: No carotid bruit.   Cardiovascular:      Rate and Rhythm: Normal rate and regular rhythm.      Pulses: Normal pulses.      Heart sounds: Normal heart sounds.   Pulmonary:      Effort: Pulmonary effort is normal.      Breath sounds: Normal breath sounds.   Musculoskeletal:         General: Normal range of motion.      Cervical back: Normal range of motion.      Right lower leg: No edema.      Left lower leg: No edema.   Skin:     General: Skin is warm.   Neurological:      Mental Status: He is alert and oriented to person, place, and time.   Psychiatric:         Mood and Affect: Mood normal.        Result Review :                 Assessment and Plan    Diagnoses and all orders for this visit:    1. Essential hypertension (Primary)  -     Comprehensive metabolic panel; Future    2. Anemia, unspecified type  -     CBC w AUTO Differential; Future    3. Hypogonadism in male  -     Testosterone (Free & Total), LC / MS; Future    4. Chest pain, unspecified type  -     Ambulatory Referral to Cardiology    5. Primary osteoarthritis of right shoulder  -     meloxicam (MOBIC) 15 MG tablet; Take 1 tablet by mouth Daily. As needed for pain  Dispense: 30 tablet; Refill: 3        Follow Up   Return in about 6 months (around 7/21/2022) for Recheck.  Patient was given instructions and counseling regarding his condition or for health maintenance advice. Please see specific information pulled into the AVS if appropriate.

## 2022-01-25 LAB
TESTOST FREE SERPL-MCNC: 4.4 PG/ML (ref 6.6–18.1)
TESTOST SERPL-MCNC: 296.7 NG/DL (ref 264–916)

## 2022-01-27 ENCOUNTER — OFFICE VISIT (OUTPATIENT)
Dept: CARDIOLOGY | Facility: CLINIC | Age: 62
End: 2022-01-27

## 2022-01-27 VITALS
DIASTOLIC BLOOD PRESSURE: 78 MMHG | BODY MASS INDEX: 40.71 KG/M2 | WEIGHT: 290.8 LBS | SYSTOLIC BLOOD PRESSURE: 116 MMHG | HEIGHT: 71 IN | RESPIRATION RATE: 18 BRPM | HEART RATE: 77 BPM

## 2022-01-27 DIAGNOSIS — I20.9 ANGINA, CLASS III: Primary | ICD-10-CM

## 2022-01-27 DIAGNOSIS — Z01.818 PRE-OP TESTING: ICD-10-CM

## 2022-01-27 DIAGNOSIS — R06.02 SHORTNESS OF BREATH: ICD-10-CM

## 2022-01-27 PROBLEM — M17.9 OSTEOARTHRITIS OF KNEE: Status: ACTIVE | Noted: 2022-01-27

## 2022-01-27 PROBLEM — M54.2 NECK PAIN, ACUTE: Status: ACTIVE | Noted: 2017-07-17

## 2022-01-27 PROBLEM — G54.9 NERVE ROOT DISORDER: Status: ACTIVE | Noted: 2018-01-09

## 2022-01-27 PROCEDURE — 93000 ELECTROCARDIOGRAM COMPLETE: CPT | Performed by: INTERNAL MEDICINE

## 2022-01-27 PROCEDURE — 99204 OFFICE O/P NEW MOD 45 MIN: CPT | Performed by: INTERNAL MEDICINE

## 2022-01-27 RX ORDER — ASPIRIN 81 MG/1
81 TABLET ORAL DAILY
Qty: 30 TABLET | Refills: 11 | Status: SHIPPED | OUTPATIENT
Start: 2022-01-27 | End: 2023-01-30 | Stop reason: SDUPTHER

## 2022-01-27 NOTE — PROGRESS NOTES
Cardiology Clinic Note  Tevin Meza MD, PhD    Subjective:     Encounter Date:01/27/2022      Patient ID: Ty Bergeron is a 61 y.o. male.    Chief Complaint:  Chief Complaint   Patient presents with   • Chest Pain       HPI:  I the pleasure seeing this very pleasant 61-year-old gentleman is a new patient today for chief complaint of chest pain.  Blood pressure 160/78 heart rates in 70s.  He has risk factors of hypertension hyperlipidemia family history with brother with multivessel bypass surgery and his father had an MI in the past.  He is a non-smoker he is not diabetic but he has 3-4 beers a day and he drinks a lot of coffee every day as well.  He displays dyspnea on exertion and substernal chest pain with exertion predictable fashion concerning for obstructive coronary disease as well as CCS class III angina.  Per history he does have a history of thromboembolic disease with DVT PE which also may need to be examined his he is not presently on anticoagulation systemically if cardiac work-up is unremarkable.    With his family history risk factors and symptomatology he has a high pretest probability and stress testing would be inappropriate, we did discuss cardiac catheterization for definitive evaluation which he is amenable for and the patient would prefer this rather than stress testing himself.  We will start an aspirin daily get a 2D echo and schedule his left heart catheterization for definitive evaluation for explanation of his concerning symptomatology      Historical data copied forward from previous encounters in EMR including the history, exam, and assessment/plan has been reviewed and is unchanged unless noted otherwise.    Cardiac medicines reviewed with risk, benefits, and necessity of each discussed.    Risk and benefit of cardiac testing reviewed including death heart attack stroke pain bleeding infection need for vascular /cardiovascular surgery were discussed and the patient  "    Objective:         /78 (BP Location: Left arm, Patient Position: Sitting)   Pulse 77   Resp 18   Ht 180.3 cm (71\")   Wt 132 kg (290 lb 12.8 oz)   BMI 40.56 kg/m²     Physical Exam  Regular rate and rhythm no rubs gallops [place]  1 out of 6 systolic murmur left sternal border  No clubbing cyanosis, trace edema peripherally  Radial pulses 2+ equal bilaterally  Soft nontender nondistended  Note morbidly obese BMI greater than 40  Intact grossly  Clear to auscultation  Assessment:         Diagnoses and all orders for this visit:    1. Angina, class III (HCC) (Primary)  Overview:  Added automatically from request for surgery [id number]    Orders:  -     Adult Transthoracic Echo Complete W/ Cont if Necessary Per Protocol; Future  -     Case Request Cath Lab: Left Heart Cath  -     CBC & Differential  -     Comprehensive Metabolic Panel  -     Protime-INR    2. Shortness of breath  -     Adult Transthoracic Echo Complete W/ Cont if Necessary Per Protocol; Future    3. Pre-op testing  -     COVID PRE-OP / PRE-PROCEDURE SCREENING ORDER (NO ISOLATION) - Swab, Nasopharynx; Future    Other orders  -     aspirin (aspirin) 81 MG EC tablet; Take 1 tablet by mouth Daily.  Dispense: 30 tablet; Refill: 11           Plan:        2D echo as above  Left heart catheterization as above  Aspirin daily  Continue other regimen    Follow-up in 30 days      The pleasure to be involved in this patient's cardiovascular care.  Please call with any questions or concerns  Tevin Meza MD, PhD    Most recent EKG as reviewed and interpreted by me:    ECG 12 Lead    Date/Time: 1/27/2022 4:51 PM  Performed by: Tevin Meza MD  Authorized by: Tevin Meza MD   Rhythm: sinus rhythm  Rate: normal  Conduction: non-specific intraventricular conduction delay  QRS axis: left    Clinical impression: non-specific ECG             Most recent echo as reviewed and interpreted by me:      Most recent stress test as reviewed " "and interpreted by me:      Most recent cardiac catheterization as reviewed interpreted by me:  No results found for this or any previous visit.    The following portions of the patient's history were reviewed and updated as appropriate: allergies, current medications, past family history, past medical history, past social history, past surgical history and problem list.      ROS:  14 point review of systems negative except as mentioned above    Current Outpatient Medications:   •  amLODIPine (NORVASC) 2.5 MG tablet, Take 1 tablet by mouth Daily. FOR BLOOD PRESSURE, Disp: 90 tablet, Rfl: 3  •  atenolol (TENORMIN) 50 MG tablet, Take 1 tablet by mouth Daily., Disp: 90 tablet, Rfl: 1  •  citalopram (CeleXA) 20 MG tablet, TAKE ONE TABLET BY MOUTH DAILY, Disp: 90 tablet, Rfl: 2  •  lisinopril (PRINIVIL,ZESTRIL) 40 MG tablet, TAKE ONE TABLET BY MOUTH DAILY, Disp: 90 tablet, Rfl: 1  •  meloxicam (MOBIC) 15 MG tablet, Take 1 tablet by mouth Daily. As needed for pain, Disp: 30 tablet, Rfl: 3  •  rosuvastatin (CRESTOR) 10 MG tablet, Take 1 tablet by mouth Daily., Disp: 90 tablet, Rfl: 1  •  sildenafil (VIAGRA) 100 MG tablet, TAKE ONE TABLET BY MOUTH DAILY AS NEEDED FOR ERECTILE DYSFUNCTION, Disp: 10 tablet, Rfl: 2  •  traZODone (DESYREL) 150 MG tablet, Take 1 tablet by mouth every night at bedtime., Disp: 90 tablet, Rfl: 1  •  aspirin (aspirin) 81 MG EC tablet, Take 1 tablet by mouth Daily., Disp: 30 tablet, Rfl: 11  •  B-D 3CC LUER-LAVERNE SYR 25GX1/2\" 25G X 1-1/2\" 3 ML misc, USE WITH TESTOSTERONE INJECTION, Disp: 30 each, Rfl: 0    Problem List:  Patient Active Problem List   Diagnosis   • Acute embolism and thrombosis of deep vein of lower extremity (HCC)   • Anemia   • Anxiety   • Calculus of gallbladder   • Carpal tunnel syndrome   • Depression   • Gastroesophageal reflux disease   • Hypercholesterolemia   • Hyperglycemia   • Hypertension   • Hypogonadism in male   • Physical exam   • Rotator cuff tendonitis, right   • " Subacromial bursitis of right shoulder joint   • Primary osteoarthritis of both knees   • Leg cramps   • Combined arterial insufficiency and corporo-venous occlusive erectile dysfunction   • Thrombocytopenia (HCC)   • H/O splenectomy   • Primary osteoarthritis of right shoulder   • Lung nodule   • Umbilical hernia   • Sleep apnea   • Personal history of endocrine, metabolic, and immunity disorder   • Osteoarthritis of knee   • Obesity   • Nerve root disorder   • Medial epicondylitis, left   • Neck pain, acute   • Angina, class III (HCC)     Past Medical History:  Past Medical History:   Diagnosis Date   • Anxiety    • Cholelithiasis    • Depression     History of overdose years ago   • DVT, bilateral lower limbs (HCC)    • GERD (gastroesophageal reflux disease)    • Hyperlipidemia    • Hypertension    • Obesity    • CASS (obstructive sleep apnea)     not treating   • Pituitary microadenoma (HCC)    • Testosterone deficiency    • Thrombocytopenia (HCC)      Past Surgical History:  Past Surgical History:   Procedure Laterality Date   • COLONOSCOPY      2011- due 5 years    • HERNIA REPAIR     • SPLENECTOMY     • TOTAL SHOULDER REPLACEMENT  02/12/2021   • VASECTOMY       Social History:  Social History     Socioeconomic History   • Marital status:    Tobacco Use   • Smoking status: Never Smoker   • Smokeless tobacco: Never Used   Vaping Use   • Vaping Use: Never used   Substance and Sexual Activity   • Alcohol use: Yes   • Drug use: No   • Sexual activity: Not Currently     Allergies:  Allergies   Allergen Reactions   • Lipitor [Atorvastatin Calcium] Myalgia     Immunizations:  Immunization History   Administered Date(s) Administered   • COVID-19 (PFIZER) PURPLE CAP 03/13/2021, 04/03/2021, 01/08/2022   • DTaP 04/27/2016   • Flu Vaccine Quad PF >18YRS 10/07/2020   • Meningococcal B,(Bexsero) 10/31/2018, 01/17/2019   • Meningococcal Conjugate 04/27/2016, 07/27/2016   • Pneumococcal Conjugate 13-Valent (PCV13)  04/27/2016   • Pneumococcal Polysaccharide (PPSV23) 07/27/2016   • Shingrix 08/12/2020, 10/06/2020, 02/26/2021   • Tdap 04/27/2016            In-Office Procedure(s):  No orders to display        ASCVD RIsk Score::  The 10-year ASCVD risk score (Jessenia GANDHI Jr., et al., 2013) is: 4.8%    Values used to calculate the score:      Age: 61 years      Sex: Male      Is Non- : No      Diabetic: No      Tobacco smoker: No      Systolic Blood Pressure: 116 mmHg      Is BP treated: Yes      HDL Cholesterol: 85 mg/dL      Total Cholesterol: 164 mg/dL    Imaging:    Results for orders placed in visit on 02/05/21    SCANNED - IMAGING       Results for orders placed in visit on 01/13/21    CT Chest Without Contrast      Results for orders placed in visit on 01/13/21    CT Chest Without Contrast      Lab Review:   Lab on 01/21/2022   Component Date Value   • Testosterone, Total 01/21/2022 296.7    • Testosterone, Free 01/21/2022 4.4*   • Glucose 01/21/2022 97    • BUN 01/21/2022 12    • Creatinine 01/21/2022 0.92    • Sodium 01/21/2022 140    • Potassium 01/21/2022 4.9    • Chloride 01/21/2022 102    • CO2 01/21/2022 27.0    • Calcium 01/21/2022 9.1    • Total Protein 01/21/2022 6.7    • Albumin 01/21/2022 4.00    • ALT (SGPT) 01/21/2022 24    • AST (SGOT) 01/21/2022 27    • Alkaline Phosphatase 01/21/2022 59    • Total Bilirubin 01/21/2022 0.4    • eGFR Non  Amer 01/21/2022 84    • Globulin 01/21/2022 2.7    • A/G Ratio 01/21/2022 1.5    • BUN/Creatinine Ratio 01/21/2022 13.0    • Anion Gap 01/21/2022 11.0    • WBC 01/21/2022 5.29    • RBC 01/21/2022 4.32    • Hemoglobin 01/21/2022 13.4    • Hematocrit 01/21/2022 40.4    • MCV 01/21/2022 93.5    • MCH 01/21/2022 31.0    • MCHC 01/21/2022 33.2    • RDW 01/21/2022 13.9    • RDW-SD 01/21/2022 47.5    • MPV 01/21/2022 12.2*   • Platelets 01/21/2022 156    • Neutrophil % 01/21/2022 61.1    • Lymphocyte % 01/21/2022 18.3*   • Monocyte % 01/21/2022 13.6*   •  Eosinophil % 01/21/2022 4.7    • Basophil % 01/21/2022 1.9*   • Immature Grans % 01/21/2022 0.4    • Neutrophils, Absolute 01/21/2022 3.23    • Lymphocytes, Absolute 01/21/2022 0.97    • Monocytes, Absolute 01/21/2022 0.72    • Eosinophils, Absolute 01/21/2022 0.25    • Basophils, Absolute 01/21/2022 0.10    • Immature Grans, Absolute 01/21/2022 0.02    • nRBC 01/21/2022 0.2      Recent labs reviewed and interpreted for clinical significance and application            Level of Care:           Tevin Meza MD  01/27/22  .

## 2022-02-01 ENCOUNTER — LAB (OUTPATIENT)
Dept: LAB | Facility: HOSPITAL | Age: 62
End: 2022-02-01

## 2022-02-01 DIAGNOSIS — Z01.818 PRE-OP TESTING: ICD-10-CM

## 2022-02-01 LAB
INR PPP: <0.93 (ref 0.93–1.1)
PROTHROMBIN TIME: 10.3 SECONDS (ref 9.6–11.7)

## 2022-02-01 PROCEDURE — 36415 COLL VENOUS BLD VENIPUNCTURE: CPT | Performed by: INTERNAL MEDICINE

## 2022-02-01 PROCEDURE — U0004 COV-19 TEST NON-CDC HGH THRU: HCPCS

## 2022-02-01 PROCEDURE — 85025 COMPLETE CBC W/AUTO DIFF WBC: CPT | Performed by: INTERNAL MEDICINE

## 2022-02-01 PROCEDURE — 85610 PROTHROMBIN TIME: CPT | Performed by: INTERNAL MEDICINE

## 2022-02-01 PROCEDURE — C9803 HOPD COVID-19 SPEC COLLECT: HCPCS | Performed by: INTERNAL MEDICINE

## 2022-02-01 PROCEDURE — 80053 COMPREHEN METABOLIC PANEL: CPT | Performed by: INTERNAL MEDICINE

## 2022-02-02 LAB
ALBUMIN SERPL-MCNC: 4 G/DL (ref 3.5–5.2)
ALBUMIN/GLOB SERPL: 1.5 G/DL
ALP SERPL-CCNC: 57 U/L (ref 39–117)
ALT SERPL W P-5'-P-CCNC: 21 U/L (ref 1–41)
ANION GAP SERPL CALCULATED.3IONS-SCNC: 13 MMOL/L (ref 5–15)
AST SERPL-CCNC: 23 U/L (ref 1–40)
BASOPHILS # BLD AUTO: 0.08 10*3/MM3 (ref 0–0.2)
BASOPHILS NFR BLD AUTO: 1.1 % (ref 0–1.5)
BILIRUB SERPL-MCNC: 0.2 MG/DL (ref 0–1.2)
BUN SERPL-MCNC: 18 MG/DL (ref 8–23)
BUN/CREAT SERPL: 15.8 (ref 7–25)
CALCIUM SPEC-SCNC: 9.2 MG/DL (ref 8.6–10.5)
CHLORIDE SERPL-SCNC: 101 MMOL/L (ref 98–107)
CO2 SERPL-SCNC: 22 MMOL/L (ref 22–29)
CREAT SERPL-MCNC: 1.14 MG/DL (ref 0.76–1.27)
DEPRECATED RDW RBC AUTO: 46.3 FL (ref 37–54)
EOSINOPHIL # BLD AUTO: 0.31 10*3/MM3 (ref 0–0.4)
EOSINOPHIL NFR BLD AUTO: 4.4 % (ref 0.3–6.2)
ERYTHROCYTE [DISTWIDTH] IN BLOOD BY AUTOMATED COUNT: 13.7 % (ref 12.3–15.4)
GFR SERPL CREATININE-BSD FRML MDRD: 66 ML/MIN/1.73
GLOBULIN UR ELPH-MCNC: 2.7 GM/DL
GLUCOSE SERPL-MCNC: 82 MG/DL (ref 65–99)
HCT VFR BLD AUTO: 36.1 % (ref 37.5–51)
HGB BLD-MCNC: 12.3 G/DL (ref 13–17.7)
IMM GRANULOCYTES # BLD AUTO: 0.02 10*3/MM3 (ref 0–0.05)
IMM GRANULOCYTES NFR BLD AUTO: 0.3 % (ref 0–0.5)
LYMPHOCYTES # BLD AUTO: 1.5 10*3/MM3 (ref 0.7–3.1)
LYMPHOCYTES NFR BLD AUTO: 21.5 % (ref 19.6–45.3)
MCH RBC QN AUTO: 31.8 PG (ref 26.6–33)
MCHC RBC AUTO-ENTMCNC: 34.1 G/DL (ref 31.5–35.7)
MCV RBC AUTO: 93.3 FL (ref 79–97)
MONOCYTES # BLD AUTO: 1.05 10*3/MM3 (ref 0.1–0.9)
MONOCYTES NFR BLD AUTO: 15 % (ref 5–12)
NEUTROPHILS NFR BLD AUTO: 4.02 10*3/MM3 (ref 1.7–7)
NEUTROPHILS NFR BLD AUTO: 57.7 % (ref 42.7–76)
NRBC BLD AUTO-RTO: 0.1 /100 WBC (ref 0–0.2)
PLATELET # BLD AUTO: 127 10*3/MM3 (ref 140–450)
PMV BLD AUTO: 12.2 FL (ref 6–12)
POTASSIUM SERPL-SCNC: 4.5 MMOL/L (ref 3.5–5.2)
PROT SERPL-MCNC: 6.7 G/DL (ref 6–8.5)
RBC # BLD AUTO: 3.87 10*6/MM3 (ref 4.14–5.8)
SARS-COV-2 ORF1AB RESP QL NAA+PROBE: NOT DETECTED
SODIUM SERPL-SCNC: 136 MMOL/L (ref 136–145)
WBC NRBC COR # BLD: 6.98 10*3/MM3 (ref 3.4–10.8)

## 2022-02-03 ENCOUNTER — HOSPITAL ENCOUNTER (OUTPATIENT)
Facility: HOSPITAL | Age: 62
Setting detail: HOSPITAL OUTPATIENT SURGERY
Discharge: HOME | End: 2022-02-03
Attending: INTERNAL MEDICINE | Admitting: INTERNAL MEDICINE

## 2022-02-03 VITALS
HEIGHT: 70 IN | HEART RATE: 61 BPM | RESPIRATION RATE: 16 BRPM | TEMPERATURE: 98 F | BODY MASS INDEX: 42.36 KG/M2 | OXYGEN SATURATION: 99 % | WEIGHT: 295.86 LBS | SYSTOLIC BLOOD PRESSURE: 108 MMHG | DIASTOLIC BLOOD PRESSURE: 59 MMHG

## 2022-02-03 DIAGNOSIS — I20.9 ANGINA, CLASS III: ICD-10-CM

## 2022-02-03 PROCEDURE — C1760 CLOSURE DEV, VASC: HCPCS | Performed by: INTERNAL MEDICINE

## 2022-02-03 PROCEDURE — 93458 L HRT ARTERY/VENTRICLE ANGIO: CPT | Performed by: INTERNAL MEDICINE

## 2022-02-03 PROCEDURE — 0 IOPAMIDOL PER 1 ML: Performed by: INTERNAL MEDICINE

## 2022-02-03 PROCEDURE — 99153 MOD SED SAME PHYS/QHP EA: CPT | Performed by: INTERNAL MEDICINE

## 2022-02-03 PROCEDURE — 25010000002 MIDAZOLAM PER 1 MG: Performed by: INTERNAL MEDICINE

## 2022-02-03 PROCEDURE — 99152 MOD SED SAME PHYS/QHP 5/>YRS: CPT | Performed by: INTERNAL MEDICINE

## 2022-02-03 PROCEDURE — 25010000002 FENTANYL CITRATE (PF) 100 MCG/2ML SOLUTION: Performed by: INTERNAL MEDICINE

## 2022-02-03 PROCEDURE — C1769 GUIDE WIRE: HCPCS | Performed by: INTERNAL MEDICINE

## 2022-02-03 PROCEDURE — 25010000002 PHENYLEPHRINE 10 MG/ML SOLUTION: Performed by: INTERNAL MEDICINE

## 2022-02-03 PROCEDURE — C1894 INTRO/SHEATH, NON-LASER: HCPCS | Performed by: INTERNAL MEDICINE

## 2022-02-03 RX ORDER — SODIUM CHLORIDE 9 MG/ML
3 INJECTION, SOLUTION INTRAVENOUS CONTINUOUS
Status: DISCONTINUED | OUTPATIENT
Start: 2022-02-03 | End: 2022-02-03 | Stop reason: HOSPADM

## 2022-02-03 RX ORDER — LIDOCAINE HYDROCHLORIDE 20 MG/ML
INJECTION, SOLUTION INFILTRATION; PERINEURAL AS NEEDED
Status: DISCONTINUED | OUTPATIENT
Start: 2022-02-03 | End: 2022-02-03 | Stop reason: HOSPADM

## 2022-02-03 RX ORDER — LISINOPRIL 40 MG/1
40 TABLET ORAL DAILY
COMMUNITY
End: 2022-04-19

## 2022-02-03 RX ORDER — FENTANYL CITRATE 50 UG/ML
INJECTION, SOLUTION INTRAMUSCULAR; INTRAVENOUS AS NEEDED
Status: DISCONTINUED | OUTPATIENT
Start: 2022-02-03 | End: 2022-02-03 | Stop reason: HOSPADM

## 2022-02-03 RX ORDER — MELOXICAM 15 MG/1
15 TABLET ORAL AS NEEDED
COMMUNITY
End: 2022-09-16

## 2022-02-03 RX ORDER — PHENYLEPHRINE HYDROCHLORIDE 10 MG/ML
INJECTION INTRAVENOUS AS NEEDED
Status: DISCONTINUED | OUTPATIENT
Start: 2022-02-03 | End: 2022-02-03 | Stop reason: HOSPADM

## 2022-02-03 RX ORDER — MIDAZOLAM HYDROCHLORIDE 1 MG/ML
INJECTION INTRAMUSCULAR; INTRAVENOUS AS NEEDED
Status: DISCONTINUED | OUTPATIENT
Start: 2022-02-03 | End: 2022-02-03 | Stop reason: HOSPADM

## 2022-02-03 RX ORDER — NITROGLYCERIN 5 MG/ML
INJECTION, SOLUTION INTRAVENOUS AS NEEDED
Status: DISCONTINUED | OUTPATIENT
Start: 2022-02-03 | End: 2022-02-03 | Stop reason: HOSPADM

## 2022-02-03 RX ORDER — SODIUM CHLORIDE 9 MG/ML
30 INJECTION, SOLUTION INTRAVENOUS CONTINUOUS
Status: DISCONTINUED | OUTPATIENT
Start: 2022-02-03 | End: 2022-02-03 | Stop reason: HOSPADM

## 2022-02-03 RX ORDER — SILDENAFIL 100 MG/1
100 TABLET, FILM COATED ORAL DAILY PRN
COMMUNITY
End: 2022-02-10

## 2022-02-03 RX ORDER — CITALOPRAM 20 MG/1
20 TABLET ORAL DAILY
COMMUNITY
End: 2022-07-04

## 2022-02-03 RX ORDER — ACETAMINOPHEN 325 MG/1
650 TABLET ORAL EVERY 4 HOURS PRN
Status: DISCONTINUED | OUTPATIENT
Start: 2022-02-03 | End: 2022-02-03 | Stop reason: HOSPADM

## 2022-02-03 RX ADMIN — SODIUM CHLORIDE 30 ML/HR: 9 INJECTION, SOLUTION INTRAVENOUS at 10:14

## 2022-02-03 NOTE — DISCHARGE INSTRUCTIONS
Post Cath Instructions    Call Dr. Meza’s office to schedule a follow up appointment in 2-4 weeks.  Specific Physician Instructions:     1) Drink plenty of fluids for the next 24 hours.  This helps to eliminate the dye used in your procedure through urination.  You may resume a normal diet; however, try to avoid foods that would cause gas or constipation.    2) Sedative medication given to you during your catheterization may decrease your judgement and reaction time for up to 24-48 hours.  Therefore:  a. DO NOT drive or operate hazardous machinery (48 hours)  b. DO NOT consume alcoholic beverages  c. DO NOT make any important/legal decisions  d. Have someone stay with you for at least 24 hours    3) To allow proper healing and prevent bleeding, the following activities are to be strictly avoided for the next 24-48 hours:  a. Excessive bending at wound site  b. Straining (anything that would tense up muscles around the affected puncture site)  c. Lifting objects greater than 5 pounds, pushing, or pulling for 5 days  i. For Arm Cases:  1. No flexing at the puncture site, such as hammering, golfing, bowling, or swinging any objects  ii. For Groin Cases:  1. Refrain from sexual activity  2. Refrain from running or vigorous walking  3. No prolonged sitting or standing  4. Limit stair climbing as much as possible    4) Keep the puncture site clean and dry.  You may remove the dressing tomorrow and replace it with a band-aid for at least one additional day.  Gently clean the site with mild soap and water.  No scrubbing/rubbing and lightly pat the area dry.  Showers are acceptable; however, avoid submerging in water (tub baths, hot tubs, swimming pools, dishwater, etc…) for at least one week.  The site should be completely healed before resuming these activities to reduce the risk of infection.  Check the site often.  Watch for signs and symptoms of infection and notify your physician if any of the following  occur:  a. Bleeding or an increase in swelling at the puncture site  b. Fever  c. Increased soreness around puncture site  d. Foul odor or significant drainage from the puncture site  e. Swelling, redness, or warmth at the puncture site    **A bruise or small “pea sized” lump under the skin at the puncture site is not unusual.  This should disappear within 3-4 weeks.**  5) CONTACT YOUR PHYSICIAN OR CALL 911 IF YOU EXPERIENCE ANY OF THE FOLLOWING:  a. Increased angina (chest pain) or frequent sensations of pressure, burning, pain, or other discomfort in the chest, arm, jaws, or stomach  b. Lightheadedness, dizziness, faint feeling, sweating, or difficulty breathing  c. Odd sensation changes like numbness, tingling, coldness, or pain in the arm or leg in which the catheter was inserted  d. Limb in which the catheter was inserted becomes pale/bluish in color    IMPORTANT:  Although this occurs very rarely, if you should develop bright red or excessive bleeding, feel a “pop” inside at the insertion site, or notice a sudden increase in swelling larger than a walnut, you should call 911.  Hold continuous firm pressure to the access site until emergency personnel arrive.  It is best if someone else can do this for you.

## 2022-02-10 RX ORDER — SILDENAFIL 100 MG/1
TABLET, FILM COATED ORAL
Qty: 10 TABLET | Refills: 2 | Status: SHIPPED | OUTPATIENT
Start: 2022-02-10 | End: 2022-03-11

## 2022-02-14 ENCOUNTER — HOSPITAL ENCOUNTER (OUTPATIENT)
Dept: CARDIOLOGY | Facility: HOSPITAL | Age: 62
Discharge: HOME | End: 2022-02-14
Admitting: INTERNAL MEDICINE

## 2022-02-14 VITALS
BODY MASS INDEX: 42.23 KG/M2 | DIASTOLIC BLOOD PRESSURE: 88 MMHG | WEIGHT: 295 LBS | HEIGHT: 70 IN | SYSTOLIC BLOOD PRESSURE: 148 MMHG | HEART RATE: 52 BPM

## 2022-02-14 DIAGNOSIS — I20.9 ANGINA, CLASS III: ICD-10-CM

## 2022-02-14 DIAGNOSIS — R06.02 SHORTNESS OF BREATH: ICD-10-CM

## 2022-02-14 PROCEDURE — 93306 TTE W/DOPPLER COMPLETE: CPT | Performed by: INTERNAL MEDICINE

## 2022-02-14 PROCEDURE — 93306 TTE W/DOPPLER COMPLETE: CPT

## 2022-02-21 LAB
BH CV ECHO MEAS - ACS: 2.1 CM
BH CV ECHO MEAS - AO MAX PG (FULL): 0.89 MMHG
BH CV ECHO MEAS - AO MAX PG: 4.5 MMHG
BH CV ECHO MEAS - AO MEAN PG (FULL): 0.09 MMHG
BH CV ECHO MEAS - AO MEAN PG: 1.9 MMHG
BH CV ECHO MEAS - AO ROOT AREA (BSA CORRECTED): 1.2
BH CV ECHO MEAS - AO ROOT AREA: 6.9 CM^2
BH CV ECHO MEAS - AO ROOT DIAM: 3 CM
BH CV ECHO MEAS - AO V2 MAX: 106.5 CM/SEC
BH CV ECHO MEAS - AO V2 MEAN: 61.9 CM/SEC
BH CV ECHO MEAS - AO V2 VTI: 25.1 CM
BH CV ECHO MEAS - ASC AORTA: 3.4 CM
BH CV ECHO MEAS - AVA(I,A): 2.9 CM^2
BH CV ECHO MEAS - AVA(I,D): 2.9 CM^2
BH CV ECHO MEAS - AVA(V,A): 3.1 CM^2
BH CV ECHO MEAS - AVA(V,D): 3.1 CM^2
BH CV ECHO MEAS - BSA(HAYCOCK): 2.6 M^2
BH CV ECHO MEAS - BSA: 2.5 M^2
BH CV ECHO MEAS - BZI_BMI: 42.3 KILOGRAMS/M^2
BH CV ECHO MEAS - BZI_METRIC_HEIGHT: 177.8 CM
BH CV ECHO MEAS - BZI_METRIC_WEIGHT: 133.8 KG
BH CV ECHO MEAS - EDV(CUBED): 92.9 ML
BH CV ECHO MEAS - EDV(MOD-SP4): 103.3 ML
BH CV ECHO MEAS - EDV(TEICH): 93.8 ML
BH CV ECHO MEAS - EF(CUBED): 68.9 %
BH CV ECHO MEAS - EF(MOD-BP): 60 %
BH CV ECHO MEAS - EF(MOD-SP4): 60.4 %
BH CV ECHO MEAS - EF(TEICH): 60.6 %
BH CV ECHO MEAS - ESV(CUBED): 28.9 ML
BH CV ECHO MEAS - ESV(MOD-SP4): 40.9 ML
BH CV ECHO MEAS - ESV(TEICH): 37 ML
BH CV ECHO MEAS - FS: 32.2 %
BH CV ECHO MEAS - IVS/LVPW: 0.86
BH CV ECHO MEAS - IVSD: 1.3 CM
BH CV ECHO MEAS - LA DIMENSION(2D): 5.2 CM
BH CV ECHO MEAS - LA DIMENSION: 5.2 CM
BH CV ECHO MEAS - LA/AO: 1.7
BH CV ECHO MEAS - LV DIASTOLIC VOL/BSA (35-75): 42 ML/M^2
BH CV ECHO MEAS - LV MASS(C)D: 240.7 GRAMS
BH CV ECHO MEAS - LV MASS(C)DI: 97.8 GRAMS/M^2
BH CV ECHO MEAS - LV MAX PG: 3.6 MMHG
BH CV ECHO MEAS - LV MEAN PG: 1.8 MMHG
BH CV ECHO MEAS - LV SYSTOLIC VOL/BSA (12-30): 16.6 ML/M^2
BH CV ECHO MEAS - LV V1 MAX: 95.4 CM/SEC
BH CV ECHO MEAS - LV V1 MEAN: 63.7 CM/SEC
BH CV ECHO MEAS - LV V1 VTI: 21.2 CM
BH CV ECHO MEAS - LVIDD: 4.5 CM
BH CV ECHO MEAS - LVIDS: 3.1 CM
BH CV ECHO MEAS - LVOT AREA: 3.5 CM^2
BH CV ECHO MEAS - LVOT DIAM: 2.1 CM
BH CV ECHO MEAS - LVPWD: 1.5 CM
BH CV ECHO MEAS - MV A MAX VEL: 39.9 CM/SEC
BH CV ECHO MEAS - MV DEC SLOPE: 307.8 CM/SEC^2
BH CV ECHO MEAS - MV DEC TIME: 0.26 SEC
BH CV ECHO MEAS - MV E MAX VEL: 79.3 CM/SEC
BH CV ECHO MEAS - MV E/A: 2
BH CV ECHO MEAS - MV MAX PG: 3.2 MMHG
BH CV ECHO MEAS - MV MEAN PG: 0.96 MMHG
BH CV ECHO MEAS - MV V2 MAX: 89.6 CM/SEC
BH CV ECHO MEAS - MV V2 MEAN: 44.7 CM/SEC
BH CV ECHO MEAS - MV V2 VTI: 30.9 CM
BH CV ECHO MEAS - MVA(VTI): 2.4 CM^2
BH CV ECHO MEAS - PA ACC TIME: 0.09 SEC
BH CV ECHO MEAS - PA MAX PG (FULL): 1.8 MMHG
BH CV ECHO MEAS - PA MAX PG: 3.5 MMHG
BH CV ECHO MEAS - PA MEAN PG (FULL): 1.1 MMHG
BH CV ECHO MEAS - PA MEAN PG: 2 MMHG
BH CV ECHO MEAS - PA PR(ACCEL): 39.6 MMHG
BH CV ECHO MEAS - PA V2 MAX: 93.2 CM/SEC
BH CV ECHO MEAS - PA V2 MEAN: 65.7 CM/SEC
BH CV ECHO MEAS - PA V2 VTI: 23.9 CM
BH CV ECHO MEAS - PULM A REVS DUR: 0.05 SEC
BH CV ECHO MEAS - PULM A REVS VEL: 25.3 CM/SEC
BH CV ECHO MEAS - PULM DIAS VEL: 51.4 CM/SEC
BH CV ECHO MEAS - PULM S/D: 1.2
BH CV ECHO MEAS - PULM SYS VEL: 62 CM/SEC
BH CV ECHO MEAS - RAP SYSTOLE: 3 MMHG
BH CV ECHO MEAS - RV MAX PG: 1.6 MMHG
BH CV ECHO MEAS - RV MEAN PG: 0.89 MMHG
BH CV ECHO MEAS - RV V1 MAX: 64.2 CM/SEC
BH CV ECHO MEAS - RV V1 MEAN: 43.8 CM/SEC
BH CV ECHO MEAS - RV V1 VTI: 18.8 CM
BH CV ECHO MEAS - RVDD: 3.2 CM
BH CV ECHO MEAS - RVSP: 27.9 MMHG
BH CV ECHO MEAS - SI(AO): 69.8 ML/M^2
BH CV ECHO MEAS - SI(CUBED): 26 ML/M^2
BH CV ECHO MEAS - SI(LVOT): 29.7 ML/M^2
BH CV ECHO MEAS - SI(MOD-SP4): 25.3 ML/M^2
BH CV ECHO MEAS - SI(TEICH): 23.1 ML/M^2
BH CV ECHO MEAS - SV(AO): 171.9 ML
BH CV ECHO MEAS - SV(CUBED): 64 ML
BH CV ECHO MEAS - SV(LVOT): 73.2 ML
BH CV ECHO MEAS - SV(MOD-SP4): 62.4 ML
BH CV ECHO MEAS - SV(TEICH): 56.8 ML
BH CV ECHO MEAS - TR MAX VEL: 249.2 CM/SEC

## 2022-02-28 ENCOUNTER — OFFICE VISIT (OUTPATIENT)
Dept: CARDIOLOGY | Facility: CLINIC | Age: 62
End: 2022-02-28

## 2022-02-28 VITALS
BODY MASS INDEX: 41.83 KG/M2 | WEIGHT: 292.2 LBS | SYSTOLIC BLOOD PRESSURE: 116 MMHG | RESPIRATION RATE: 18 BRPM | HEIGHT: 70 IN | DIASTOLIC BLOOD PRESSURE: 78 MMHG | HEART RATE: 65 BPM

## 2022-02-28 DIAGNOSIS — I10 PRIMARY HYPERTENSION: ICD-10-CM

## 2022-02-28 DIAGNOSIS — I82.409 ACUTE EMBOLISM AND THROMBOSIS OF DEEP VEIN OF LOWER EXTREMITY, UNSPECIFIED LATERALITY: ICD-10-CM

## 2022-02-28 DIAGNOSIS — R06.02 SHORTNESS OF BREATH: ICD-10-CM

## 2022-02-28 DIAGNOSIS — Z01.818 PRE-OP TESTING: ICD-10-CM

## 2022-02-28 DIAGNOSIS — I20.9 ANGINA, CLASS III: Primary | ICD-10-CM

## 2022-02-28 PROCEDURE — 99214 OFFICE O/P EST MOD 30 MIN: CPT | Performed by: INTERNAL MEDICINE

## 2022-02-28 NOTE — PROGRESS NOTES
Cardiology Clinic Note  Tevin Meza MD, PhD    Subjective:     Encounter Date:02/28/2022      Patient ID: Ty Bergeron is a 61 y.o. male.    Chief Complaint:  Chief Complaint   Patient presents with   • Follow-up       HPI:      I the pleasure seeing this very pleasant 61-year-old gentleman is a new patient today for chief complaint of chest pain.  Blood pressure 160/78 heart rates in 70s.  He has risk factors of hypertension hyperlipidemia family history with brother with multivessel bypass surgery and his father had an MI in the past.  He is a non-smoker he is not diabetic but he has 3-4 beers a day and he drinks a lot of coffee every day as well.  He displays dyspnea on exertion and substernal chest pain with exertion predictable fashion concerning for obstructive coronary disease as well as CCS class III angina.  Per history he does have a history of thromboembolic disease with DVT PE which also may need to be examined his he is not presently on anticoagulation systemically if cardiac work-up is unremarkable.  He underwent left heart catheterization with coronary angiography, 2D echo.  He had structurally normal heart with normal LV and RV size and function, no significant valvular abnormality other than trace to mild regurgitation.  Blood pressures controlled today.  Coronary angiography revealed small arteries which tapers dramatically but no obstructive CAD other than mild luminal regularities and he had normal filling pressures.  He denies any new symptoms today is otherwise doing well no unexplained syncope no unexplained edema no shortness of breath above normal.  We discussed that he has no restrictions for any diet exercise or other exertional restrictions.       Historical data copied forward from previous encounters in EMR including the history, exam, and assessment/plan has been reviewed and is unchanged unless noted otherwise.    Cardiac medicines reviewed with risk, benefits, and necessity  "of each discussed.    Risk and benefit of cardiac testing reviewed including death heart attack stroke pain bleeding infection need for vascular /cardiovascular surgery were discussed and the patient     Objective:         /78 (BP Location: Left arm, Patient Position: Sitting)   Pulse 65   Resp 18   Ht 177.8 cm (70\")   Wt 133 kg (292 lb 3.2 oz)   BMI 41.93 kg/m²     Physical Exam  Regular rate and rhythm no rubs gallops heaves left  1 out of 6 systolic ejection murmur lower sternal border  No clubbing cyanosis or edema  Obese soft nontender nondistended  Clear to auscultation  No carotid bruits or JVD  Assessment:         Shortness of breath dyspnea exertion  Appears stable  EF normal  Normal diastolic function  Normal RV size and function with normal atrial sizes  Trace to mild valvular insufficiency as documented, needs repeat echo in 3 to 5 years per guidelines  Primary prevention goals for CAD  Diet and exercise per AHA guidelines  Continue present medications    Return to clinic in 1 year  Back to primary care in the interim    Please call with any questions or concerns    Films for left heart catheterization were reviewed with patient at length today    Greater than 25-minute spent face-to-face with the patient as well as in coordination of care and documentation      The pleasure to be involved in this patient's cardiovascular care.  Please call with any questions or concerns  Tevin Meza MD, PhD    Most recent EKG as reviewed and interpreted by me:  Procedures     Most recent echo as reviewed and interpreted by me:  Results for orders placed during the hospital encounter of 02/14/22    Adult Transthoracic Echo Complete W/ Cont if Necessary Per Protocol    Interpretation Summary  · Left ventricular systolic function is normal.  · Left ventricular ejection fraction is 60 to 65%  · Left ventricular diastolic function was normal.      Most recent stress test as reviewed and interpreted by " me:      Most recent cardiac catheterization as reviewed interpreted by me:  Results for orders placed during the hospital encounter of 02/03/22    Cardiac Catheterization/Vascular Study    Narrative   Tevin Meza MD, PhD  [facility]  Date of service 2-3-22    Procedure  1 left heart catheterization with coronary angiography and left ventriculography in BATISTA position    Indication  Refractory exertional chest pain to medical therapy, high pretest probability given risk factors for CAD    After informed consent patient was brought to the catheterization lab sterilely prepped and draped in usual fashion exposure the right groin for right common femoral arterial access via micropuncture modified center technique, a 6 Mauritian sheath was placed under fluoroscopic guidance which was aspirated flushed with heparinized saline.  035 guidewire was advanced through eval followed by diagnostic JL4 and JR4 catheters for selective left and right coronary angiography.  JR4 was used across aortic valve easily, EDP assessment pullback assessment of the transaortic valve gradient after hand-injection for LV gram demonstrated normal LV systolic function.  There was no obstructive CAD, all catheters were removed.  6 Mauritian Angio-Seal was used to close right common femoral durotomy with immediate hemostasis and means of distal pulses.  There are no complications.  Patient left the Cath Lab chest pain-free hemodynamically electrically stable or talking to staff neurologically grossly tight bilaterally    Complications none  Blood loss less than 5 cc  Contrast used is 85 cc for the entirety procedure including LV gram  Moderate conscious sedation of 30 minutes with IV Versed and fentanyl administered by registered nurse with complete ECG pulse oximetry and hemodynamic monitoring throughout the entirety of the case observed by me    Findings  1.  Open aortic pressure was 82/40, 100 mcg of Jovani-Synephrine was  given with a starting pressure for the case of greater than 100 systolic to ensure safety of the procedure  2.  Closing pressure 110/66  3.  LVEDP 5  4.  For LV function normal LVEF 65 to 70%  5.  No transaortic valve gradient or LVOT gradient seen on pullback    Angiography  1 left main medium caliber vessel with an acute angle takeoff but no obstructive disease giving LAD and nondominant circumflex  2.  The LAD is a medium caliber vessel coursing to and apex with 2 diagonal branches, there is lumen regularities only less than 20% throughout this distribution  3.  Nondominant circumflex with 1 obtuse marginal branch distally and a small recurrent left atrial branch, there is minimal angiographic disease throughout next  #4 RCA is a very large caliber vessel with large PLV and PDA segments which reaches the apex, there is no angiographic disease throughout the RCA distribution    Angiographically left-sided vessels appear very small, appears to taper distally might have microvascular disease or microvascular dysfunction with mildly slow filling despite normalization of blood pressures with Jovani-Synephrine and low LVEDP.    Conclusions and recommendations  1.  Normal epicardial coronary anatomy, no epicardial obstructive CAD, low LVEDP at 4 to 5 mmHg, normal LV systolic function and normal transaortic valve gradient  2.  Encourage hydration  3.  Optimize antihypertensive, minimize hypotension, consider stopping calcium channel blockers  4.  No restrictions, diet and exercise is recommended given obesity and likely significant deconditioning with BMI greater than 40, likely microvascular function metabolic syndrome    Patient be discharged safely home today    Tevin Meza MD, PhD    The following portions of the patient's history were reviewed and updated as appropriate: allergies, current medications, past family history, past medical history, past social history, past surgical history and problem  list.      ROS:  14 point review of systems negative except as mentioned above    Current Outpatient Medications:   •  amLODIPine (NORVASC) 2.5 MG tablet, Take 1 tablet by mouth Daily. FOR BLOOD PRESSURE, Disp: 90 tablet, Rfl: 3  •  aspirin (aspirin) 81 MG EC tablet, Take 1 tablet by mouth Daily., Disp: 30 tablet, Rfl: 11  •  atenolol (TENORMIN) 50 MG tablet, Take 1 tablet by mouth Daily., Disp: 90 tablet, Rfl: 1  •  citalopram (CeleXA) 20 MG tablet, Take 20 mg by mouth Daily., Disp: , Rfl:   •  lisinopril (PRINIVIL,ZESTRIL) 40 MG tablet, Take 40 mg by mouth Daily., Disp: , Rfl:   •  meloxicam (MOBIC) 15 MG tablet, Take 15 mg by mouth As Needed., Disp: , Rfl:   •  rosuvastatin (CRESTOR) 10 MG tablet, Take 1 tablet by mouth Daily., Disp: 90 tablet, Rfl: 1  •  sildenafil (VIAGRA) 100 MG tablet, TAKE ONE TABLET BY MOUTH DAILY AS NEEDED FOR ERECTILLE DYSFUNCTION, Disp: 10 tablet, Rfl: 2  •  traZODone (DESYREL) 150 MG tablet, Take 1 tablet by mouth every night at bedtime., Disp: 90 tablet, Rfl: 1    Problem List:  Patient Active Problem List   Diagnosis   • Acute embolism and thrombosis of deep vein of lower extremity (HCC)   • Anemia   • Anxiety   • Calculus of gallbladder   • Carpal tunnel syndrome   • Depression   • Gastroesophageal reflux disease   • Hypercholesterolemia   • Hyperglycemia   • Hypertension   • Hypogonadism in male   • Physical exam   • Rotator cuff tendonitis, right   • Subacromial bursitis of right shoulder joint   • Primary osteoarthritis of both knees   • Leg cramps   • Combined arterial insufficiency and corporo-venous occlusive erectile dysfunction   • Thrombocytopenia (HCC)   • H/O splenectomy   • Primary osteoarthritis of right shoulder   • Lung nodule   • Umbilical hernia   • Sleep apnea   • Personal history of endocrine, metabolic, and immunity disorder   • Osteoarthritis of knee   • Obesity   • Nerve root disorder   • Medial epicondylitis, left   • Neck pain, acute   • Angina, class III  (HCC)     Past Medical History:  Past Medical History:   Diagnosis Date   • Anxiety    • Cholelithiasis    • Depression     History of overdose years ago   • DVT, bilateral lower limbs (HCC)    • GERD (gastroesophageal reflux disease)    • Hyperlipidemia    • Hypertension    • Obesity    • CASS (obstructive sleep apnea)     not treating   • Pituitary microadenoma (HCC)    • Testosterone deficiency    • Thrombocytopenia (HCC)      Past Surgical History:  Past Surgical History:   Procedure Laterality Date   • CARDIAC CATHETERIZATION N/A 2/3/2022    Procedure: Left Heart Cath;  Surgeon: Tevin Meza MD;  Location:  [facility];  Service: Cardiology;  Laterality: N/A;   • CARDIAC CATHETERIZATION N/A 2/3/2022    Procedure: Coronary angiography;  Surgeon: Tevin Meza MD;  Location:  [facility];  Service: Cardiology;  Laterality: N/A;   • CARDIAC CATHETERIZATION N/A 2/3/2022    Procedure: Left ventriculography;  Surgeon: Tevin Meza MD;  Location:  [facility];  Service: Cardiology;  Laterality: N/A;   • COLONOSCOPY      2011- due 5 years    • HERNIA REPAIR     • SPLENECTOMY     • TOTAL SHOULDER REPLACEMENT  02/12/2021   • VASECTOMY       Social History:  Social History     Socioeconomic History   • Marital status:    Tobacco Use   • Smoking status: Never Smoker   • Smokeless tobacco: Never Used   Vaping Use   • Vaping Use: Never used   Substance and Sexual Activity   • Alcohol use: Yes     Alcohol/week: 20.0 standard drinks     Types: 20 Cans of beer per week   • Drug use: No   • Sexual activity: Not Currently     Allergies:  Allergies   Allergen Reactions   • Lipitor [Atorvastatin Calcium] Myalgia     Immunizations:  Immunization History   Administered Date(s) Administered   • COVID-19 (PFIZER) PURPLE CAP 03/13/2021, 04/03/2021, 01/08/2022   • DTaP 04/27/2016   • Flu Vaccine Quad PF >18YRS 10/07/2020   • Meningococcal  B,(Bexsero) 10/31/2018, 01/17/2019   • Meningococcal Conjugate 04/27/2016, 07/27/2016   • Pneumococcal Conjugate 13-Valent (PCV13) 04/27/2016   • Pneumococcal Polysaccharide (PPSV23) 07/27/2016   • Shingrix 08/12/2020, 10/06/2020, 02/26/2021   • Tdap 04/27/2016            In-Office Procedure(s):  No orders to display        ASCVD RIsk Score::  The 10-year ASCVD risk score (Jessenia GANDHI Jr., et al., 2013) is: 4.8%    Values used to calculate the score:      Age: 61 years      Sex: Male      Is Non- : No      Diabetic: No      Tobacco smoker: No      Systolic Blood Pressure: 116 mmHg      Is BP treated: Yes      HDL Cholesterol: 85 mg/dL      Total Cholesterol: 164 mg/dL    Imaging:    Results for orders placed in visit on 02/05/21    SCANNED - IMAGING       Results for orders placed in visit on 01/13/21    CT Chest Without Contrast      Results for orders placed in visit on 01/13/21    CT Chest Without Contrast      Lab Review:   Hospital Outpatient Visit on 02/14/2022   Component Date Value   • BSA 02/14/2022 2.5    • RVIDd 02/14/2022 3.2    • IVSd 02/14/2022 1.3    • LVIDd 02/14/2022 4.5    • LVIDs 02/14/2022 3.1    • LVPWd 02/14/2022 1.5    • IVS/LVPW 02/14/2022 0.86    • FS 02/14/2022 32.2    • EDV(Teich) 02/14/2022 93.8    • ESV(Teich) 02/14/2022 37.0    • EF(Teich) 02/14/2022 60.6    • EDV(cubed) 02/14/2022 92.9    • ESV(cubed) 02/14/2022 28.9    • EF(cubed) 02/14/2022 68.9    • LV mass(C)d 02/14/2022 240.7    • LV mass(C)dI 02/14/2022 97.8    • SV(Teich) 02/14/2022 56.8    • SI(Teich) 02/14/2022 23.1    • SV(cubed) 02/14/2022 64.0    • SI(cubed) 02/14/2022 26.0    • Ao root diam 02/14/2022 3.0    • Ao root area 02/14/2022 6.9    • ACS 02/14/2022 2.1    • LA dimension 02/14/2022 5.2    • asc Aorta Diam 02/14/2022 3.4    • LA/Ao 02/14/2022 1.7    • LVOT diam 02/14/2022 2.1    • LVOT area 02/14/2022 3.5    • EDV(MOD-sp4) 02/14/2022 103.3    • ESV(MOD-sp4) 02/14/2022 40.9    • EF(MOD-sp4)  02/14/2022 60.4    • SV(MOD-sp4) 02/14/2022 62.4    • SI(MOD-sp4) 02/14/2022 25.3    • Ao root area (BSA correc* 02/14/2022 1.2    • LV Cortes Vol (BSA correct* 02/14/2022 42.0    • LV Sys Vol (BSA correcte* 02/14/2022 16.6    • MV E max naresh 02/14/2022 79.3    • MV A max naresh 02/14/2022 39.9    • MV E/A 02/14/2022 2.0    • MV V2 max 02/14/2022 89.6    • MV max PG 02/14/2022 3.2    • MV V2 mean 02/14/2022 44.7    • MV mean PG 02/14/2022 0.96    • MV V2 VTI 02/14/2022 30.9    • MVA(VTI) 02/14/2022 2.4    • MV dec slope 02/14/2022 307.8    • MV dec time 02/14/2022 0.26    • Ao pk naresh 02/14/2022 106.5    • Ao max PG 02/14/2022 4.5    • Ao max PG (full) 02/14/2022 0.89    • Ao V2 mean 02/14/2022 61.9    • Ao mean PG 02/14/2022 1.9    • Ao mean PG (full) 02/14/2022 0.09    • Ao V2 VTI 02/14/2022 25.1    • DENIS(I,A) 02/14/2022 2.9    • DENIS(I,D) 02/14/2022 2.9    • DENIS(V,A) 02/14/2022 3.1    • DENIS(V,D) 02/14/2022 3.1    • LV V1 max PG 02/14/2022 3.6    • LV V1 mean PG 02/14/2022 1.8    • LV V1 max 02/14/2022 95.4    • LV V1 mean 02/14/2022 63.7    • LV V1 VTI 02/14/2022 21.2    • SV(Ao) 02/14/2022 171.9    • SI(Ao) 02/14/2022 69.8    • SV(LVOT) 02/14/2022 73.2    • SI(LVOT) 02/14/2022 29.7    • PA V2 max 02/14/2022 93.2    • PA max PG 02/14/2022 3.5    • PA max PG (full) 02/14/2022 1.8    • PA V2 mean 02/14/2022 65.7    • PA mean PG 02/14/2022 2.0    • PA mean PG (full) 02/14/2022 1.1    • PA V2 VTI 02/14/2022 23.9    • PA acc time 02/14/2022 0.09    • RV V1 max PG 02/14/2022 1.6    • RV V1 mean PG 02/14/2022 0.89    • RV V1 max 02/14/2022 64.2    • RV V1 mean 02/14/2022 43.8    • RV V1 VTI 02/14/2022 18.8    • TR max naresh 02/14/2022 249.2    • RVSP(TR) 02/14/2022 27.9    • RAP systole 02/14/2022 3.0    • PA pr(Accel) 02/14/2022 39.6    • Pulm Sys Naresh 02/14/2022 62.0    • Pulm Cortes Naresh 02/14/2022 51.4    • Pulm S/D 02/14/2022 1.2    • Pulm A Revs Dur 02/14/2022 0.05    • Pulm A Revs Naresh 02/14/2022 25.3    • BH CV ECHO ELVIA -  BZI_BMI 02/14/2022 42.3    • BH CV ECHO ELVIA - BSA(HA* 02/14/2022 2.6    •  CV ECHO ELVIA - BZI_ME* 02/14/2022 133.8    •  CV ECHO ELVIA - BZI_ME* 02/14/2022 177.8    • EF(MOD-bp) 02/14/2022 60.0    • LA dimension(2D) 02/14/2022 5.2    Lab on 02/01/2022   Component Date Value   • COVID19 02/01/2022 Not Detected    Office Visit on 01/27/2022   Component Date Value   • Glucose 02/01/2022 82    • BUN 02/01/2022 18    • Creatinine 02/01/2022 1.14    • Sodium 02/01/2022 136    • Potassium 02/01/2022 4.5    • Chloride 02/01/2022 101    • CO2 02/01/2022 22.0    • Calcium 02/01/2022 9.2    • Total Protein 02/01/2022 6.7    • Albumin 02/01/2022 4.00    • ALT (SGPT) 02/01/2022 21    • AST (SGOT) 02/01/2022 23    • Alkaline Phosphatase 02/01/2022 57    • Total Bilirubin 02/01/2022 0.2    • eGFR Non  Amer 02/01/2022 66    • Globulin 02/01/2022 2.7    • A/G Ratio 02/01/2022 1.5    • BUN/Creatinine Ratio 02/01/2022 15.8    • Anion Gap 02/01/2022 13.0    • Protime 02/01/2022 10.3    • INR 02/01/2022 <0.93*   • WBC 02/01/2022 6.98    • RBC 02/01/2022 3.87*   • Hemoglobin 02/01/2022 12.3*   • Hematocrit 02/01/2022 36.1*   • MCV 02/01/2022 93.3    • MCH 02/01/2022 31.8    • MCHC 02/01/2022 34.1    • RDW 02/01/2022 13.7    • RDW-SD 02/01/2022 46.3    • MPV 02/01/2022 12.2*   • Platelets 02/01/2022 127*   • Neutrophil % 02/01/2022 57.7    • Lymphocyte % 02/01/2022 21.5    • Monocyte % 02/01/2022 15.0*   • Eosinophil % 02/01/2022 4.4    • Basophil % 02/01/2022 1.1    • Immature Grans % 02/01/2022 0.3    • Neutrophils, Absolute 02/01/2022 4.02    • Lymphocytes, Absolute 02/01/2022 1.50    • Monocytes, Absolute 02/01/2022 1.05*   • Eosinophils, Absolute 02/01/2022 0.31    • Basophils, Absolute 02/01/2022 0.08    • Immature Grans, Absolute 02/01/2022 0.02    • nRBC 02/01/2022 0.1    Lab on 01/21/2022   Component Date Value   • Testosterone, Total 01/21/2022 296.7    • Testosterone, Free 01/21/2022 4.4*   • Glucose 01/21/2022 97     • BUN 01/21/2022 12    • Creatinine 01/21/2022 0.92    • Sodium 01/21/2022 140    • Potassium 01/21/2022 4.9    • Chloride 01/21/2022 102    • CO2 01/21/2022 27.0    • Calcium 01/21/2022 9.1    • Total Protein 01/21/2022 6.7    • Albumin 01/21/2022 4.00    • ALT (SGPT) 01/21/2022 24    • AST (SGOT) 01/21/2022 27    • Alkaline Phosphatase 01/21/2022 59    • Total Bilirubin 01/21/2022 0.4    • eGFR Non  Amer 01/21/2022 84    • Globulin 01/21/2022 2.7    • A/G Ratio 01/21/2022 1.5    • BUN/Creatinine Ratio 01/21/2022 13.0    • Anion Gap 01/21/2022 11.0    • WBC 01/21/2022 5.29    • RBC 01/21/2022 4.32    • Hemoglobin 01/21/2022 13.4    • Hematocrit 01/21/2022 40.4    • MCV 01/21/2022 93.5    • MCH 01/21/2022 31.0    • MCHC 01/21/2022 33.2    • RDW 01/21/2022 13.9    • RDW-SD 01/21/2022 47.5    • MPV 01/21/2022 12.2*   • Platelets 01/21/2022 156    • Neutrophil % 01/21/2022 61.1    • Lymphocyte % 01/21/2022 18.3*   • Monocyte % 01/21/2022 13.6*   • Eosinophil % 01/21/2022 4.7    • Basophil % 01/21/2022 1.9*   • Immature Grans % 01/21/2022 0.4    • Neutrophils, Absolute 01/21/2022 3.23    • Lymphocytes, Absolute 01/21/2022 0.97    • Monocytes, Absolute 01/21/2022 0.72    • Eosinophils, Absolute 01/21/2022 0.25    • Basophils, Absolute 01/21/2022 0.10    • Immature Grans, Absolute 01/21/2022 0.02    • nRBC 01/21/2022 0.2      Recent labs reviewed and interpreted for clinical significance and application            Level of Care:           Tevin Meza MD  02/28/22  .

## 2022-03-11 RX ORDER — SILDENAFIL 100 MG/1
TABLET, FILM COATED ORAL
Qty: 10 TABLET | Refills: 2 | Status: SHIPPED | OUTPATIENT
Start: 2022-03-11 | End: 2022-04-11

## 2022-04-11 RX ORDER — SILDENAFIL 100 MG/1
TABLET, FILM COATED ORAL
Qty: 10 TABLET | Refills: 2 | Status: SHIPPED | OUTPATIENT
Start: 2022-04-11

## 2022-04-19 RX ORDER — LISINOPRIL 40 MG/1
TABLET ORAL
Qty: 90 TABLET | Refills: 1 | Status: SHIPPED | OUTPATIENT
Start: 2022-04-19 | End: 2023-02-24

## 2022-04-19 RX ORDER — TRAZODONE HYDROCHLORIDE 150 MG/1
TABLET ORAL
Qty: 30 TABLET | Refills: 3 | Status: SHIPPED | OUTPATIENT
Start: 2022-04-19 | End: 2022-11-27

## 2022-05-10 ENCOUNTER — LAB (OUTPATIENT)
Dept: FAMILY MEDICINE CLINIC | Facility: CLINIC | Age: 62
End: 2022-05-10

## 2022-05-10 ENCOUNTER — OFFICE VISIT (OUTPATIENT)
Dept: FAMILY MEDICINE CLINIC | Facility: CLINIC | Age: 62
End: 2022-05-10

## 2022-05-10 VITALS
RESPIRATION RATE: 16 BRPM | HEART RATE: 53 BPM | HEIGHT: 70 IN | OXYGEN SATURATION: 97 % | WEIGHT: 294 LBS | TEMPERATURE: 97.3 F | SYSTOLIC BLOOD PRESSURE: 110 MMHG | DIASTOLIC BLOOD PRESSURE: 68 MMHG | BODY MASS INDEX: 42.09 KG/M2

## 2022-05-10 DIAGNOSIS — S10.86XA TICK BITE OF OTHER PART OF NECK, INITIAL ENCOUNTER: ICD-10-CM

## 2022-05-10 DIAGNOSIS — S10.86XA TICK BITE OF OTHER PART OF NECK, INITIAL ENCOUNTER: Primary | ICD-10-CM
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
## 2022-05-10 PROCEDURE — 86757 RICKETTSIA ANTIBODY: CPT | Performed by: NURSE PRACTITIONER

## 2022-05-10 PROCEDURE — 86003 ALLG SPEC IGE CRUDE XTRC EA: CPT | Performed by: NURSE PRACTITIONER

## 2022-05-10 PROCEDURE — 86008 ALLG SPEC IGE RECOMB EA: CPT | Performed by: NURSE PRACTITIONER

## 2022-05-10 PROCEDURE — 86618 LYME DISEASE ANTIBODY: CPT | Performed by: NURSE PRACTITIONER

## 2022-05-10 PROCEDURE — 87798 DETECT AGENT NOS DNA AMP: CPT | Performed by: NURSE PRACTITIONER

## 2022-05-10 PROCEDURE — 99213 OFFICE O/P EST LOW 20 MIN: CPT | Performed by: NURSE PRACTITIONER

## 2022-05-10 PROCEDURE — 82785 ASSAY OF IGE: CPT | Performed by: NURSE PRACTITIONER

## 2022-05-10 PROCEDURE — 36415 COLL VENOUS BLD VENIPUNCTURE: CPT

## 2022-05-10 NOTE — PROGRESS NOTES
"Chief Complaint  Tick Removal (Tick bite ) and Vomiting  Subjective        Ty Bergeron presents to [facility]  Pt comes in today with c/o tick bite noticed on Ankit May 1. Not sure how long it was attached, but thinks less than 24 hours.   Then on Thursday 5/5 ate Qdoba and ate meat, and later became nauseous, and had vomiting and chills. Lasted about 12 hours or less. Had diarrhea.   No fever. No body aches or HA's.   Pt is certain that it was a [place] tick and is concerned about alph-gal allergy          Objective     Vital Signs:   /68   Pulse 53   Temp 97.3 °F (36.3 °C)   Resp 16   Ht 177.8 cm (70\")   Wt 133 kg (294 lb)   SpO2 97%   BMI 42.18 kg/m²       BP Readings from Last 3 Encounters:   05/10/22 110/68   02/28/22 116/78   02/14/22 148/88       Wt Readings from Last 3 Encounters:   05/10/22 133 kg (294 lb)   02/28/22 133 kg (292 lb 3.2 oz)   02/14/22 134 kg (295 lb)     Physical Exam  Constitutional:       Appearance: He is well-developed.   Eyes:      Pupils: Pupils are equal, round, and reactive to light.   Cardiovascular:      Rate and Rhythm: Normal rate and regular rhythm.   Pulmonary:      Effort: Pulmonary effort is normal.      Breath sounds: Normal breath sounds.   Skin:     Comments: Tick bite to left neck/shoulder. No bulls eye. No signs of infection     Neurological:      Mental Status: He is alert and oriented to person, place, and time.        Result Review :                 Assessment and Plan    Diagnoses and all orders for this visit:    1. Tick bite of other part of neck, initial encounter (Primary)  -     Lyme Disease Total Antibody With Reflex to Immunoassay; Future  -     [place] Spotted Fever, IgM; Future  -     Ehrlichia Profile DNA PCR; Future  -     Alpha-Gal IgE Panel; Future    check labs  During this office visit, we discussed the pertinent aspects of the visit and treatment recommendations. Pt verbalizes " understanding. Follow up was discussed. Patient was given the opportunity to ask questions and discuss other concerns.         Follow Up   Return if symptoms worsen or fail to improve.  Patient was given instructions and counseling regarding his condition or for health maintenance advice. Please see specific information pulled into the AVS if appropriate.

## 2022-05-11 LAB — B BURGDOR IGG+IGM SER QL IA: NEGATIVE

## 2022-05-12 LAB — R RICKETTSI IGM SER-ACNC: 0.62 INDEX (ref 0–0.89)

## 2022-05-16 LAB
A PHAGOCYTOPH DNA BLD QL NAA+PROBE: NEGATIVE
E CHAFFEENSIS DNA BLD QL NAA+PROBE: NEGATIVE

## 2022-05-18 LAB
ALPHA-GAL IGE QN: 43.4 KU/L
BEEF IGE QN: 5.77 KU/L
CONV CLASS DESCRIPTION: ABNORMAL
IGE SERPL-ACNC: 198 IU/ML (ref 6–495)
LAMB IGE QN: 1.49 KU/L
PORK IGE QN: 2.67 KU/L

## 2022-07-04 RX ORDER — CITALOPRAM 20 MG/1
TABLET ORAL
Qty: 30 TABLET | Refills: 2 | Status: SHIPPED | OUTPATIENT
Start: 2022-07-04 | End: 2022-10-03

## 2022-07-11 RX ORDER — ATENOLOL 50 MG/1
TABLET ORAL
Qty: 30 TABLET | Refills: 2 | Status: SHIPPED | OUTPATIENT
Start: 2022-07-11 | End: 2022-10-05

## 2022-07-11 RX ORDER — ROSUVASTATIN CALCIUM 10 MG/1
TABLET, COATED ORAL
Qty: 30 TABLET | Refills: 2 | Status: SHIPPED | OUTPATIENT
Start: 2022-07-11 | End: 2022-10-05

## 2022-07-21 ENCOUNTER — OFFICE VISIT (OUTPATIENT)
Dept: FAMILY MEDICINE CLINIC | Facility: CLINIC | Age: 62
End: 2022-07-21

## 2022-07-21 ENCOUNTER — LAB (OUTPATIENT)
Dept: FAMILY MEDICINE CLINIC | Facility: CLINIC | Age: 62
End: 2022-07-21

## 2022-07-21 VITALS
HEART RATE: 73 BPM | DIASTOLIC BLOOD PRESSURE: 80 MMHG | BODY MASS INDEX: 43.23 KG/M2 | WEIGHT: 302 LBS | RESPIRATION RATE: 16 BRPM | SYSTOLIC BLOOD PRESSURE: 112 MMHG | OXYGEN SATURATION: 96 % | HEIGHT: 70 IN

## 2022-07-21 DIAGNOSIS — E29.1 HYPOGONADISM IN MALE: ICD-10-CM

## 2022-07-21 DIAGNOSIS — Z12.5 SCREENING PSA (PROSTATE SPECIFIC ANTIGEN): ICD-10-CM

## 2022-07-21 DIAGNOSIS — Z00.00 VISIT FOR PREVENTIVE HEALTH EXAMINATION: ICD-10-CM

## 2022-07-21 DIAGNOSIS — E78.00 HYPERCHOLESTEROLEMIA: ICD-10-CM

## 2022-07-21 DIAGNOSIS — I10 ESSENTIAL HYPERTENSION: Primary | ICD-10-CM

## 2022-07-21 DIAGNOSIS — I10 ESSENTIAL HYPERTENSION: ICD-10-CM

## 2022-07-21 LAB
25(OH)D3 SERPL-MCNC: 31.5 NG/ML (ref 30–100)
ALBUMIN SERPL-MCNC: 3.9 G/DL (ref 3.5–5.2)
ALBUMIN/GLOB SERPL: 1.4 G/DL
ALP SERPL-CCNC: 60 U/L (ref 39–117)
ALT SERPL W P-5'-P-CCNC: 21 U/L (ref 1–41)
ANION GAP SERPL CALCULATED.3IONS-SCNC: 10.6 MMOL/L (ref 5–15)
AST SERPL-CCNC: 18 U/L (ref 1–40)
BILIRUB SERPL-MCNC: 0.3 MG/DL (ref 0–1.2)
BILIRUB UR QL STRIP: NEGATIVE
BUN SERPL-MCNC: 17 MG/DL (ref 8–23)
BUN/CREAT SERPL: 17.7 (ref 7–25)
CALCIUM SPEC-SCNC: 9 MG/DL (ref 8.6–10.5)
CHLORIDE SERPL-SCNC: 102 MMOL/L (ref 98–107)
CHOLEST SERPL-MCNC: 176 MG/DL (ref 0–200)
CLARITY UR: CLEAR
CO2 SERPL-SCNC: 23.4 MMOL/L (ref 22–29)
COLOR UR: YELLOW
CREAT SERPL-MCNC: 0.96 MG/DL (ref 0.76–1.27)
DEPRECATED RDW RBC AUTO: 49.6 FL (ref 37–54)
EGFRCR SERPLBLD CKD-EPI 2021: 89.9 ML/MIN/1.73
ERYTHROCYTE [DISTWIDTH] IN BLOOD BY AUTOMATED COUNT: 14.8 % (ref 12.3–15.4)
GLOBULIN UR ELPH-MCNC: 2.8 GM/DL
GLUCOSE SERPL-MCNC: 93 MG/DL (ref 65–99)
GLUCOSE UR STRIP-MCNC: NEGATIVE MG/DL
HCT VFR BLD AUTO: 37.8 % (ref 37.5–51)
HDLC SERPL-MCNC: 74 MG/DL (ref 40–60)
HGB BLD-MCNC: 12.7 G/DL (ref 13–17.7)
HGB UR QL STRIP.AUTO: NEGATIVE
KETONES UR QL STRIP: NEGATIVE
LDLC SERPL CALC-MCNC: 87 MG/DL (ref 0–100)
LDLC/HDLC SERPL: 1.16 {RATIO}
LEUKOCYTE ESTERASE UR QL STRIP.AUTO: NEGATIVE
MCH RBC QN AUTO: 31 PG (ref 26.6–33)
MCHC RBC AUTO-ENTMCNC: 33.6 G/DL (ref 31.5–35.7)
MCV RBC AUTO: 92.2 FL (ref 79–97)
NITRITE UR QL STRIP: NEGATIVE
PH UR STRIP.AUTO: 6 [PH] (ref 5–8)
PLATELET # BLD AUTO: 163 10*3/MM3 (ref 140–450)
PMV BLD AUTO: 12.3 FL (ref 6–12)
POTASSIUM SERPL-SCNC: 4.8 MMOL/L (ref 3.5–5.2)
PROT SERPL-MCNC: 6.7 G/DL (ref 6–8.5)
PROT UR QL STRIP: NEGATIVE
PSA SERPL-MCNC: 0.34 NG/ML (ref 0–4)
RBC # BLD AUTO: 4.1 10*6/MM3 (ref 4.14–5.8)
SODIUM SERPL-SCNC: 136 MMOL/L (ref 136–145)
SP GR UR STRIP: 1.02 (ref 1–1.03)
TRIGL SERPL-MCNC: 81 MG/DL (ref 0–150)
TSH SERPL DL<=0.05 MIU/L-ACNC: 2.41 UIU/ML (ref 0.27–4.2)
UROBILINOGEN UR QL STRIP: NORMAL
VLDLC SERPL-MCNC: 15 MG/DL (ref 5–40)
WBC NRBC COR # BLD: 7.33 10*3/MM3 (ref 3.4–10.8)

## 2022-07-21 PROCEDURE — 84403 ASSAY OF TOTAL TESTOSTERONE: CPT | Performed by: NURSE PRACTITIONER

## 2022-07-21 PROCEDURE — 99396 PREV VISIT EST AGE 40-64: CPT | Performed by: NURSE PRACTITIONER

## 2022-07-21 PROCEDURE — 84402 ASSAY OF FREE TESTOSTERONE: CPT | Performed by: NURSE PRACTITIONER

## 2022-07-21 PROCEDURE — G0103 PSA SCREENING: HCPCS | Performed by: NURSE PRACTITIONER

## 2022-07-21 PROCEDURE — 82306 VITAMIN D 25 HYDROXY: CPT | Performed by: NURSE PRACTITIONER

## 2022-07-21 PROCEDURE — 80061 LIPID PANEL: CPT | Performed by: NURSE PRACTITIONER

## 2022-07-21 PROCEDURE — 36415 COLL VENOUS BLD VENIPUNCTURE: CPT

## 2022-07-21 PROCEDURE — 80053 COMPREHEN METABOLIC PANEL: CPT | Performed by: NURSE PRACTITIONER

## 2022-07-21 PROCEDURE — 85027 COMPLETE CBC AUTOMATED: CPT | Performed by: NURSE PRACTITIONER

## 2022-07-21 PROCEDURE — 84443 ASSAY THYROID STIM HORMONE: CPT | Performed by: NURSE PRACTITIONER

## 2022-07-21 PROCEDURE — 81003 URINALYSIS AUTO W/O SCOPE: CPT | Performed by: NURSE PRACTITIONER

## 2022-07-21 RX ORDER — AMLODIPINE BESYLATE 2.5 MG/1
2.5 TABLET ORAL DAILY
Qty: 90 TABLET | Refills: 3 | Status: SHIPPED | OUTPATIENT
Start: 2022-07-21 | End: 2023-02-27

## 2022-07-21 NOTE — PROGRESS NOTES
"Chief Complaint  Hypertension    Subjective          Ty Bergeron presents to [facility]  History of Present Illness    Is here today for preventive health and follow up HTN    Was seen recently for a tick bite and diagnosed with alpha-gal allergy - he tells me that he had acupuncture and his symptoms are resolved    Endorses that his diet could be improved  Does not exercise regularly    Colonoscopy in 2020, plan repeat in 5 years    Is fasting for labs    BP today is well controlled on current medicines  Denies any c/p chest pain, soa, edema, ha, dizziness, weakness    Review of Systems   Constitutional: Negative for appetite change, fatigue and fever.   Respiratory: Negative for shortness of breath.    Cardiovascular: Negative for chest pain and palpitations.   Gastrointestinal: Negative.  Negative for abdominal pain, constipation, diarrhea, nausea and vomiting.   Genitourinary: Negative for frequency and urgency.        Endorses some post void dribbling, worse with night time urination   Musculoskeletal: Negative for neck pain.   Neurological: Negative for headaches.        Has occasional dizziness with position changes   Psychiatric/Behavioral: Negative for sleep disturbance.        Endorses sleep disturbance resolved with use of trazodone       Objective   Vital Signs:  /80 (BP Location: Left arm)   Pulse 73   Resp 16   Ht 177.8 cm (70\")   Wt (!) 137 kg (302 lb)   SpO2 96%   BMI 43.33 kg/m²     BP Readings from Last 3 Encounters:   07/21/22 112/80   05/10/22 110/68   02/28/22 116/78        Wt Readings from Last 3 Encounters:   07/21/22 (!) 137 kg (302 lb)   05/10/22 133 kg (294 lb)   02/28/22 133 kg (292 lb 3.2 oz)              Physical Exam  Vitals reviewed.   Constitutional:       Appearance: Normal appearance. He is well-developed and well-groomed.   HENT:      Head: Normocephalic.      Right Ear: Tympanic membrane and ear canal normal.      Left Ear: " Tympanic membrane and ear canal normal.      Nose: Nose normal.      Mouth/Throat:      Mouth: Mucous membranes are moist.      Pharynx: Oropharynx is clear.   Eyes:      Extraocular Movements: Extraocular movements intact.   Neck:      Vascular: No carotid bruit.   Cardiovascular:      Rate and Rhythm: Normal rate and regular rhythm.      Pulses: Normal pulses.      Heart sounds: Normal heart sounds.   Pulmonary:      Effort: Pulmonary effort is normal.      Breath sounds: Normal breath sounds.   Abdominal:      General: Bowel sounds are normal.      Palpations: Abdomen is soft.      Tenderness: There is no abdominal tenderness. There is no right CVA tenderness or left CVA tenderness.   Musculoskeletal:         General: Normal range of motion.      Cervical back: Normal range of motion and neck supple. No tenderness.      Right lower leg: No edema.      Left lower leg: No edema.   Lymphadenopathy:      Cervical: No cervical adenopathy.   Skin:     General: Skin is warm.   Neurological:      Mental Status: He is alert and oriented to person, place, and time.   Psychiatric:         Mood and Affect: Mood normal.        Result Review :     CMP    CMP 1/21/22 2/1/22   Glucose 97 82   BUN 12 18   Creatinine 0.92 1.14   eGFR Non African Am 84 66   Sodium 140 136   Potassium 4.9 4.5   Chloride 102 101   Calcium 9.1 9.2   Albumin 4.00 4.00   Total Bilirubin 0.4 0.2   Alkaline Phosphatase 59 57   AST (SGOT) 27 23   ALT (SGPT) 24 21                         Assessment and Plan    Diagnoses and all orders for this visit:    1. Essential hypertension (Primary)  -     Comprehensive Metabolic Panel; Future  -     amLODIPine (NORVASC) 2.5 MG tablet; Take 1 tablet by mouth Daily. FOR BLOOD PRESSURE  Dispense: 90 tablet; Refill: 3    2. Screening PSA (prostate specific antigen)  -     PSA Screen; Future    3. Visit for preventive health examination  -     Comprehensive Metabolic Panel; Future  -     CBC (No Diff); Future  -      Urinalysis With Culture If Indicated - Urine, Clean Catch; Future  -     TSH Rfx On Abnormal To Free T4; Future  -     Vitamin D 25 Hydroxy; Future    4. Hypercholesterolemia  -     Lipid Panel; Future    5. Hypogonadism in male  -     Testosterone (Free & Total), LC / MS; Future             Follow Up   Return in about 6 months (around 1/21/2023) for Recheck HTN, annual physical 1 year.  Patient was given instructions and counseling regarding his condition or for health maintenance advice. Please see specific information pulled into the AVS if appropriate.

## 2022-07-25 LAB
TESTOST FREE SERPL-MCNC: 7.7 PG/ML (ref 6.6–18.1)
TESTOST SERPL-MCNC: 286.1 NG/DL (ref 264–916)

## 2022-09-16 RX ORDER — MELOXICAM 15 MG/1
TABLET ORAL
Qty: 30 TABLET | Refills: 1 | Status: SHIPPED | OUTPATIENT
Start: 2022-09-16

## 2022-10-03 RX ORDER — CITALOPRAM 20 MG/1
TABLET ORAL
Qty: 30 TABLET | Refills: 2 | Status: SHIPPED | OUTPATIENT
Start: 2022-10-03 | End: 2023-01-03

## 2022-10-05 RX ORDER — ATENOLOL 50 MG/1
TABLET ORAL
Qty: 30 TABLET | Refills: 2 | Status: SHIPPED | OUTPATIENT
Start: 2022-10-05 | End: 2023-01-03

## 2022-10-05 RX ORDER — ROSUVASTATIN CALCIUM 10 MG/1
TABLET, COATED ORAL
Qty: 30 TABLET | Refills: 2 | Status: SHIPPED | OUTPATIENT
Start: 2022-10-05 | End: 2023-01-03

## 2022-11-27 RX ORDER — TRAZODONE HYDROCHLORIDE 150 MG/1
TABLET ORAL
Qty: 30 TABLET | Refills: 3 | Status: SHIPPED | OUTPATIENT
Start: 2022-11-27 | End: 2023-03-27

## 2023-01-03 RX ORDER — ROSUVASTATIN CALCIUM 10 MG/1
TABLET, COATED ORAL
Qty: 90 TABLET | Refills: 1 | Status: SHIPPED | OUTPATIENT
Start: 2023-01-03

## 2023-01-03 RX ORDER — CITALOPRAM 20 MG/1
TABLET ORAL
Qty: 90 TABLET | Refills: 1 | Status: SHIPPED | OUTPATIENT
Start: 2023-01-03

## 2023-01-03 RX ORDER — ATENOLOL 50 MG/1
TABLET ORAL
Qty: 90 TABLET | Refills: 1 | Status: SHIPPED | OUTPATIENT
Start: 2023-01-03 | End: 2023-02-27

## 2023-01-30 RX ORDER — ASPIRIN 81 MG/1
81 TABLET ORAL DAILY
Qty: 30 TABLET | Refills: 11 | Status: SHIPPED | OUTPATIENT
Start: 2023-01-30

## 2023-02-24 RX ORDER — LISINOPRIL 40 MG/1
TABLET ORAL
Qty: 30 TABLET | Refills: 2 | Status: SHIPPED | OUTPATIENT
Start: 2023-02-24

## 2023-02-27 ENCOUNTER — OFFICE VISIT (OUTPATIENT)
Dept: CARDIOLOGY | Facility: CLINIC | Age: 63
End: 2023-02-27
Payer: COMMERCIAL

## 2023-02-27 VITALS
RESPIRATION RATE: 18 BRPM | BODY MASS INDEX: 41.8 KG/M2 | SYSTOLIC BLOOD PRESSURE: 91 MMHG | DIASTOLIC BLOOD PRESSURE: 61 MMHG | HEIGHT: 70 IN | HEART RATE: 45 BPM | WEIGHT: 292 LBS

## 2023-02-27 DIAGNOSIS — I10 HYPERTENSION, UNSPECIFIED TYPE: ICD-10-CM

## 2023-02-27 DIAGNOSIS — E78.00 HYPERCHOLESTEROLEMIA: ICD-10-CM

## 2023-02-27 DIAGNOSIS — Z09 FOLLOW-UP EXAM: Primary | ICD-10-CM

## 2023-02-27 PROCEDURE — 99214 OFFICE O/P EST MOD 30 MIN: CPT | Performed by: NURSE PRACTITIONER

## 2023-02-27 PROCEDURE — 93000 ELECTROCARDIOGRAM COMPLETE: CPT | Performed by: NURSE PRACTITIONER

## 2023-03-27 RX ORDER — TRAZODONE HYDROCHLORIDE 150 MG/1
TABLET ORAL
Qty: 30 TABLET | Refills: 3 | Status: SHIPPED | OUTPATIENT
Start: 2023-03-27

## 2023-04-03 DIAGNOSIS — R42 DIZZINESS: Primary | ICD-10-CM

## 2023-04-03 NOTE — PROGRESS NOTES
Cardiology Office Follow Up Visit      Primary Care Provider:  Richard Darnell APRN    Reason for f/u:     1 year follow up      Subjective     CC:    1 year follow up, lightheadedness    History of Present Illness       Ty Bergeron is a 62 y.o. male who is a patient of Dr. Meza. Pmh includes HTN, HLD, family history of CAD, h/o DVT PE currently not on anticoagulation, normal LVEF on ECHO, no obstructive CAD on C 2/2022.     Mr. Bergeron presents today for 1 year follow up. He is experiencing some lightheadedness at times. His HR is sinus bradycardia with a rate of 45bpm. His blood pressure is 91/61. We reviewed his mediations. He is free from angina or exertional symptoms. He does experience lightheadedness at times.       ASSESSMENT/PLAN:      Diagnoses and all orders for this visit:    1. Follow-up exam (Primary)    2. Hypertension, unspecified type    3. Hypercholesterolemia    4. Bradycardia    MEDICAL DECISION MAKING:  Mr. Bergeron presents for 1 year follow up. He has some lightheadedness and is noted to have low blood pressure and bradycardia. He is on amlodipine 2.5mg daily along with atenolol 50mg daily and lisinopril 40mg daily.   I will stop atenolol and amlodipine  Keep 2 week log of blood pressure and HR and notify us  We will see him back in 1 year or sooner should new issues arise. We will be in contact regarding logs.           Past Medical History:   Diagnosis Date   • Anxiety    • Cholelithiasis    • Depression     History of overdose years ago   • DVT, bilateral lower limbs    • GERD (gastroesophageal reflux disease)    • Hyperlipidemia    • Hypertension    • Obesity    • CASS (obstructive sleep apnea)     not treating   • Pituitary microadenoma    • Testosterone deficiency    • Thrombocytopenia        Past Surgical History:   Procedure Laterality Date   • CARDIAC CATHETERIZATION N/A 2/3/2022    Procedure: Left Heart Cath;  Surgeon: Tevin Meza MD;  Location: [facility]  CATH INVASIVE LOCATION;  Service: Cardiology;  Laterality: N/A;   • CARDIAC CATHETERIZATION N/A 2/3/2022    Procedure: Coronary angiography;  Surgeon: Tevin Meza MD;  Location: [facility] CATH INVASIVE LOCATION;  Service: Cardiology;  Laterality: N/A;   • CARDIAC CATHETERIZATION N/A 2/3/2022    Procedure: Left ventriculography;  Surgeon: Tevin Meza MD;  Location: [facility] CATH INVASIVE LOCATION;  Service: Cardiology;  Laterality: N/A;   • COLONOSCOPY      2011- due 5 years    • HERNIA REPAIR     • SPLENECTOMY     • TOTAL SHOULDER REPLACEMENT  02/12/2021   • VASECTOMY           Current Outpatient Medications:   •  aspirin 81 MG EC tablet, Take 1 tablet by mouth Daily., Disp: 30 tablet, Rfl: 11  •  citalopram (CeleXA) 20 MG tablet, TAKE ONE TABLET BY MOUTH DAILY, Disp: 90 tablet, Rfl: 1  •  lisinopril (PRINIVIL,ZESTRIL) 40 MG tablet, TAKE ONE TABLET BY MOUTH DAILY, Disp: 30 tablet, Rfl: 2  •  meloxicam (MOBIC) 15 MG tablet, TAKE ONE TABLET BY MOUTH DAILY AS NEEDED FOR PAIN, Disp: 30 tablet, Rfl: 1  •  rosuvastatin (CRESTOR) 10 MG tablet, TAKE ONE TABLET BY MOUTH DAILY, Disp: 90 tablet, Rfl: 1  •  sildenafil (VIAGRA) 100 MG tablet, TAKE ONE TABLET BY MOUTH DAILY AS NEEDED FOR ERECTILE DYSFUNCTION, Disp: 10 tablet, Rfl: 2  •  traZODone (DESYREL) 150 MG tablet, TAKE ONE TABLET BY MOUTH EVERY NIGHT AT BEDTIME, Disp: 30 tablet, Rfl: 3    Social History     Socioeconomic History   • Marital status:    Tobacco Use   • Smoking status: Never   • Smokeless tobacco: Never   Vaping Use   • Vaping Use: Never used   Substance and Sexual Activity   • Alcohol use: Yes     Alcohol/week: 20.0 standard drinks     Types: 20 Cans of beer per week   • Drug use: No   • Sexual activity: Not Currently     Partners: Female     Birth control/protection: Condom       Family History   Problem Relation Age of Onset   • Diabetes Father    • Liver disease Father         transplant   • Heart disease Mother    • Diabetes  "Brother    • Heart disease Brother        The following portions of the patient's history were reviewed and updated as appropriate: allergies, current medications, past family history, past medical history, past social history, past surgical history and problem list.    Review of Systems   Constitutional: Negative for chills, diaphoresis and malaise/fatigue.   Cardiovascular: Negative for chest pain, dyspnea on exertion, irregular heartbeat, leg swelling, near-syncope, orthopnea, palpitations, paroxysmal nocturnal dyspnea and syncope.   Respiratory: Negative for cough, shortness of breath, sleep disturbances due to breathing and sputum production.    Gastrointestinal: Negative for change in bowel habit.   Genitourinary: Negative for urgency.   Neurological: Positive for light-headedness. Negative for dizziness and headaches.   Psychiatric/Behavioral: Negative for altered mental status.       Pertinent items are noted in HPI, all other systems reviewed and negative    BP 91/61 (BP Location: Right arm, Patient Position: Sitting)   Pulse (!) 45   Resp 18   Ht 177.8 cm (70\")   Wt 132 kg (292 lb)   BMI 41.90 kg/m² .  Objective     Constitutional:       Appearance: Not in distress.   Neck:      Vascular: JVD normal.   Pulmonary:      Effort: Pulmonary effort is normal.      Breath sounds: Normal breath sounds.   Cardiovascular:      Normal rate. Regular rhythm.   Pulses:     Intact distal pulses.   Edema:     Peripheral edema absent.   Abdominal:      General: Bowel sounds are normal.      Palpations: Abdomen is soft.   Musculoskeletal: Normal range of motion. Skin:     General: Skin is warm and dry.   Neurological:      General: No focal deficit present.      Mental Status: Oriented to person, place and time.             ECG 12 Lead    Date/Time: 2/27/2023 1:57 PM  Performed by: Fabi Leyva APRN  Authorized by: Fabi Leyva APRN   Comparison: not compared with previous ECG   Previous ECG: no previous ECG " available  Rhythm: sinus bradycardia  Rate: bradycardic  BPM: 45  Other findings: non-specific ST-T wave changes            EKG ordered by and reviewed by me in office

## 2023-04-07 ENCOUNTER — TELEPHONE (OUTPATIENT)
Dept: CARDIOLOGY | Facility: CLINIC | Age: 63
End: 2023-04-07
Payer: COMMERCIAL

## 2023-04-07 NOTE — TELEPHONE ENCOUNTER
Reading 4/6/2023 patient trigged the monitor with a scare of skipped beats the monitor showed onset afib heart rate of 52

## 2023-04-10 ENCOUNTER — TELEPHONE (OUTPATIENT)
Dept: CARDIOLOGY | Facility: CLINIC | Age: 63
End: 2023-04-10

## 2023-04-10 NOTE — TELEPHONE ENCOUNTER
Caller: Ty Bergeron    Relationship: Self    Best call back number: [phone]    What is the best time to reach you: ANY    What was the call regarding: PT STATES THAT THE COST OF THEIR XARELTO IS TOO HIGH. THEY ARE WANTING TO KNOW IF THERE ARE ANY SUBSTITUTIONS FOR THIS MED, TO HELP COST.     Do you require a callback: YES         No cyanosis, clubbing or edema

## 2023-04-21 ENCOUNTER — TELEPHONE (OUTPATIENT)
Dept: CARDIOLOGY | Facility: CLINIC | Age: 63
End: 2023-04-21

## 2023-04-21 RX ORDER — AMIODARONE HYDROCHLORIDE 200 MG/1
200 TABLET ORAL DAILY
Qty: 30 TABLET | Refills: 2 | Status: SHIPPED | OUTPATIENT
Start: 2023-04-21

## 2023-04-21 NOTE — TELEPHONE ENCOUNTER
Caller: Ty Bergeron    Relationship to patient: Self    Best call back number: [phone]    Patient is needing: PATIENT STATED THAT HE MISSED A CALL AND THERE WAS A MESSAGE TO CALL LAURE BACK. DID NOT SEE TE.

## 2023-04-24 ENCOUNTER — OFFICE VISIT (OUTPATIENT)
Dept: FAMILY MEDICINE CLINIC | Facility: CLINIC | Age: 63
End: 2023-04-24
Payer: COMMERCIAL

## 2023-04-24 ENCOUNTER — LAB (OUTPATIENT)
Dept: FAMILY MEDICINE CLINIC | Facility: CLINIC | Age: 63
End: 2023-04-24
Payer: COMMERCIAL

## 2023-04-24 VITALS
DIASTOLIC BLOOD PRESSURE: 66 MMHG | BODY MASS INDEX: 43.23 KG/M2 | OXYGEN SATURATION: 97 % | RESPIRATION RATE: 18 BRPM | SYSTOLIC BLOOD PRESSURE: 118 MMHG | HEART RATE: 46 BPM | WEIGHT: 302 LBS | HEIGHT: 70 IN

## 2023-04-24 DIAGNOSIS — R91.1 INCIDENTAL LUNG NODULE: ICD-10-CM

## 2023-04-24 DIAGNOSIS — Z01.818 PRE-OPERATIVE CLEARANCE: ICD-10-CM

## 2023-04-24 DIAGNOSIS — Z83.3 FAMILY HISTORY OF DIABETES MELLITUS IN FATHER: ICD-10-CM

## 2023-04-24 DIAGNOSIS — Z01.818 PRE-OPERATIVE CLEARANCE: Primary | ICD-10-CM

## 2023-04-24 LAB
ALBUMIN SERPL-MCNC: 4 G/DL (ref 3.5–5.2)
ALBUMIN/GLOB SERPL: 1.6 G/DL
ALP SERPL-CCNC: 76 U/L (ref 39–117)
ALT SERPL W P-5'-P-CCNC: 15 U/L (ref 1–41)
ANION GAP SERPL CALCULATED.3IONS-SCNC: 9 MMOL/L (ref 5–15)
AST SERPL-CCNC: 16 U/L (ref 1–40)
BILIRUB SERPL-MCNC: 0.3 MG/DL (ref 0–1.2)
BUN SERPL-MCNC: 17 MG/DL (ref 8–23)
BUN/CREAT SERPL: 15.3 (ref 7–25)
CALCIUM SPEC-SCNC: 9 MG/DL (ref 8.6–10.5)
CHLORIDE SERPL-SCNC: 102 MMOL/L (ref 98–107)
CO2 SERPL-SCNC: 27 MMOL/L (ref 22–29)
CREAT SERPL-MCNC: 1.11 MG/DL (ref 0.76–1.27)
DEPRECATED RDW RBC AUTO: 42.8 FL (ref 37–54)
EGFRCR SERPLBLD CKD-EPI 2021: 75.1 ML/MIN/1.73
ERYTHROCYTE [DISTWIDTH] IN BLOOD BY AUTOMATED COUNT: 13.2 % (ref 12.3–15.4)
GLOBULIN UR ELPH-MCNC: 2.5 GM/DL
GLUCOSE SERPL-MCNC: 85 MG/DL (ref 65–99)
HBA1C MFR BLD: 5.7 % (ref 4.8–5.6)
HCT VFR BLD AUTO: 36.1 % (ref 37.5–51)
HGB BLD-MCNC: 12.3 G/DL (ref 13–17.7)
MCH RBC QN AUTO: 30.2 PG (ref 26.6–33)
MCHC RBC AUTO-ENTMCNC: 34.1 G/DL (ref 31.5–35.7)
MCV RBC AUTO: 88.7 FL (ref 79–97)
PLATELET # BLD AUTO: 162 10*3/MM3 (ref 140–450)
PMV BLD AUTO: 13 FL (ref 6–12)
POTASSIUM SERPL-SCNC: 4.5 MMOL/L (ref 3.5–5.2)
PROT SERPL-MCNC: 6.5 G/DL (ref 6–8.5)
RBC # BLD AUTO: 4.07 10*6/MM3 (ref 4.14–5.8)
SODIUM SERPL-SCNC: 138 MMOL/L (ref 136–145)
WBC NRBC COR # BLD: 8.16 10*3/MM3 (ref 3.4–10.8)

## 2023-04-24 PROCEDURE — 85027 COMPLETE CBC AUTOMATED: CPT | Performed by: NURSE PRACTITIONER

## 2023-04-24 PROCEDURE — 36415 COLL VENOUS BLD VENIPUNCTURE: CPT

## 2023-04-24 PROCEDURE — 80053 COMPREHEN METABOLIC PANEL: CPT | Performed by: NURSE PRACTITIONER

## 2023-04-24 PROCEDURE — 83036 HEMOGLOBIN GLYCOSYLATED A1C: CPT | Performed by: NURSE PRACTITIONER

## 2023-04-24 PROCEDURE — 99214 OFFICE O/P EST MOD 30 MIN: CPT | Performed by: NURSE PRACTITIONER

## 2023-04-26 ENCOUNTER — TELEPHONE (OUTPATIENT)
Dept: FAMILY MEDICINE CLINIC | Facility: CLINIC | Age: 63
End: 2023-04-26
Payer: COMMERCIAL

## 2023-04-26 NOTE — TELEPHONE ENCOUNTER
----- Message from VIKTORIYA Loomis sent at 4/26/2023  7:48 AM EDT -----  Please call Mr. Bergeron with his lab results -   His hemoglobin A1C is a little elevated at 5.7, this indicates that his blood sugar has been regularly running a little high which puts him a somewhat of an increased risk of developing diabetes, advise him to keep this in mind when making diet choices.  His metabolic panel labs are within normal, and his hemoglobin and hematocrit are slightly decreased but this has been chronic and appears stable.  In addition, please tell him that I found a follow-up CT that was done in January 2021 and which did not show any lung nodules, we can cancel the chest CT that we ordered at his visit.  Thank you

## 2023-04-27 ENCOUNTER — OFFICE VISIT (OUTPATIENT)
Dept: CARDIOLOGY | Facility: CLINIC | Age: 63
End: 2023-04-27
Payer: COMMERCIAL

## 2023-04-27 ENCOUNTER — TELEPHONE (OUTPATIENT)
Dept: CARDIOLOGY | Facility: CLINIC | Age: 63
End: 2023-04-27

## 2023-04-27 VITALS
OXYGEN SATURATION: 98 % | SYSTOLIC BLOOD PRESSURE: 112 MMHG | HEART RATE: 47 BPM | DIASTOLIC BLOOD PRESSURE: 68 MMHG | WEIGHT: 303 LBS | HEIGHT: 70 IN | BODY MASS INDEX: 43.38 KG/M2

## 2023-04-27 DIAGNOSIS — I48.0 PAROXYSMAL ATRIAL FIBRILLATION: Primary | ICD-10-CM

## 2023-04-27 DIAGNOSIS — R00.1 BRADYCARDIA, SINUS: ICD-10-CM

## 2023-04-27 DIAGNOSIS — E78.00 HYPERCHOLESTEROLEMIA: ICD-10-CM

## 2023-04-27 DIAGNOSIS — I10 HYPERTENSION, UNSPECIFIED TYPE: ICD-10-CM

## 2023-04-27 PROCEDURE — 99214 OFFICE O/P EST MOD 30 MIN: CPT | Performed by: NURSE PRACTITIONER

## 2023-04-27 RX ORDER — METOPROLOL SUCCINATE 25 MG/1
12.5 TABLET, EXTENDED RELEASE ORAL DAILY
Qty: 30 TABLET | Refills: 11 | Status: SHIPPED | OUTPATIENT
Start: 2023-04-27

## 2023-04-27 RX ORDER — CETIRIZINE HYDROCHLORIDE 10 MG/1
10 TABLET ORAL DAILY
COMMUNITY

## 2023-04-27 NOTE — PROGRESS NOTES
Cardiology Office Follow Up Visit      Primary Care Provider:  Richard Darnell APRN    Reason for f/u:     F/u monitor results, pre operative cardiac evaluation prior to knee surgery      Subjective     CC:    F/u monitor results    History of Present Illness     Ty Bergeron is a 63 y.o. male who is a patient of Dr. Meza. Pmh includes HTN, HLD, family history of CAD, h/o DVT PE currently not on anticoagulation, normal LVEF on ECHO 2/2022, no obstructive CAD on LHC 2/2022.     Mr. Bergeron was seen in office 2/2022 with complaints of dizziness. He was noted to have borderline blood pressure and bradycardia. He was taken off atenolol and amlodipine. A monitor was placed.    Monitor revealed episodes of afib with variable rates. He was started on anticoagulation with Xarelto 20mg daily along with amiodarone 200mg daily and metoprolol tartrate 25mg BID.     He presents today for follow up regarding monitor results and to discuss pre operative risk assessment prior to knee surgery which is not yet scheduled.  He denies chest pain or shortness of breath. He is sinus bradycardia today with HR in the 40s. He denies dizziness. His blood pressure is 112/68. He occasionally felt palpitations with monitor.           ASSESSMENT/PLAN:      Diagnoses and all orders for this visit:    1. Paroxysmal atrial fibrillation (Primary)  -     Ambulatory Referral to Cardiac Electrophysiology    2. Bradycardia, sinus    3. Hypertension, unspecified type    4. Hypercholesterolemia    Other orders  -     metoprolol succinate XL (TOPROL-XL) 25 MG 24 hr tablet; Take 0.5 tablets by mouth Daily.  Dispense: 30 tablet; Refill: 11        MEDICAL DECISION MAKING:  Mr. Bergeron has paroxysmal atrial fibrillation. His sinus rates are sinus bradycardia in the 40s. I will refer him to electrophysiology for afib. I will continue amiodarone 200mg daily for now. I will reduce his metoprolol. I will change it to Toprol XL 12.5mg daily.  Continue Xarleto.   He has normal LVEF, no significant VHD, no signs of congestive heart failure. He has had recent cath which showed no obstructive CAD.   Cardiology has been asked for per operative risk assessment prior to knee surgery. He may proceed without additional testing or work up. He is at an acceptable risk. I will request EP referral but if knee surgery is scheduled prior to then that is fine too. He is on Xarelto. Has a h/o DVT/PE I recommend bridging with lovenox after stopping Xarelto prior to surgery then resuming after surgery.     We will see him back in 3 months.           Past Medical History:   Diagnosis Date   • Anxiety    • Cholelithiasis    • Depression     History of overdose years ago   • DVT, bilateral lower limbs    • GERD (gastroesophageal reflux disease)    • Hyperlipidemia    • Hypertension    • Obesity    • CASS (obstructive sleep apnea)     not treating   • Pituitary microadenoma    • Testosterone deficiency    • Thrombocytopenia        Past Surgical History:   Procedure Laterality Date   • CARDIAC CATHETERIZATION N/A 2/3/2022    Procedure: Left Heart Cath;  Surgeon: Tevin Meza MD;  Location: [facility] CATH INVASIVE LOCATION;  Service: Cardiology;  Laterality: N/A;   • CARDIAC CATHETERIZATION N/A 2/3/2022    Procedure: Coronary angiography;  Surgeon: Tevin Meza MD;  Location:  [facility] CATH INVASIVE LOCATION;  Service: Cardiology;  Laterality: N/A;   • CARDIAC CATHETERIZATION N/A 2/3/2022    Procedure: Left ventriculography;  Surgeon: Tevin Meza MD;  Location:  [facility] CATH INVASIVE LOCATION;  Service: Cardiology;  Laterality: N/A;   • COLONOSCOPY      2011- due 5 years    • HERNIA REPAIR     • SPLENECTOMY     • TOTAL SHOULDER REPLACEMENT  02/12/2021   • VASECTOMY           Current Outpatient Medications:   •  amiodarone (PACERONE) 200 MG tablet, Take 1 tablet by mouth Daily., Disp: 30 tablet, Rfl: 2  •  aspirin 81 MG EC tablet, Take 1 tablet by  mouth Daily., Disp: 30 tablet, Rfl: 11  •  cetirizine (zyrTEC) 10 MG tablet, Take 1 tablet by mouth Daily., Disp: , Rfl:   •  citalopram (CeleXA) 20 MG tablet, TAKE ONE TABLET BY MOUTH DAILY, Disp: 90 tablet, Rfl: 1  •  lisinopril (PRINIVIL,ZESTRIL) 40 MG tablet, TAKE ONE TABLET BY MOUTH DAILY, Disp: 30 tablet, Rfl: 2  •  meloxicam (MOBIC) 15 MG tablet, TAKE ONE TABLET BY MOUTH DAILY AS NEEDED FOR PAIN, Disp: 30 tablet, Rfl: 1  •  rivaroxaban (Xarelto) 20 MG tablet, Take 1 tablet by mouth Daily for 90 days., Disp: 30 tablet, Rfl: 3  •  rosuvastatin (CRESTOR) 10 MG tablet, TAKE ONE TABLET BY MOUTH DAILY, Disp: 90 tablet, Rfl: 1  •  sildenafil (VIAGRA) 100 MG tablet, TAKE ONE TABLET BY MOUTH DAILY AS NEEDED FOR ERECTILE DYSFUNCTION, Disp: 10 tablet, Rfl: 2  •  traZODone (DESYREL) 150 MG tablet, TAKE ONE TABLET BY MOUTH EVERY NIGHT AT BEDTIME, Disp: 30 tablet, Rfl: 3  •  metoprolol succinate XL (TOPROL-XL) 25 MG 24 hr tablet, Take 0.5 tablets by mouth Daily., Disp: 30 tablet, Rfl: 11    Social History     Socioeconomic History   • Marital status:    Tobacco Use   • Smoking status: Never   • Smokeless tobacco: Never   Vaping Use   • Vaping Use: Never used   Substance and Sexual Activity   • Alcohol use: Yes     Alcohol/week: 20.0 standard drinks     Types: 20 Cans of beer per week   • Drug use: No   • Sexual activity: Not Currently     Partners: Female     Birth control/protection: Condom       Family History   Problem Relation Age of Onset   • Diabetes Father    • Liver disease Father         transplant   • Heart disease Mother    • Diabetes Brother    • Heart disease Brother        The following portions of the patient's history were reviewed and updated as appropriate: allergies, current medications, past family history, past medical history, past social history, past surgical history and problem list.    Review of Systems   Constitutional: Negative for chills, diaphoresis and malaise/fatigue.  "  Cardiovascular: Positive for palpitations. Negative for chest pain, dyspnea on exertion, irregular heartbeat, leg swelling, near-syncope, orthopnea, paroxysmal nocturnal dyspnea and syncope.   Respiratory: Negative for cough, shortness of breath, sleep disturbances due to breathing and sputum production.    Gastrointestinal: Negative for change in bowel habit.   Genitourinary: Negative for urgency.   Neurological: Negative for dizziness and headaches.   Psychiatric/Behavioral: Negative for altered mental status.       Pertinent items are noted in HPI, all other systems reviewed and negative    /68 (BP Location: Left arm, Patient Position: Sitting, Cuff Size: Large Adult)   Pulse (!) 47   Ht 177.8 cm (70\")   Wt (!) 137 kg (303 lb)   SpO2 98%   BMI 43.48 kg/m² .  Objective     Constitutional:       Appearance: Not in distress.   Neck:      Vascular: JVD normal.   Pulmonary:      Effort: Pulmonary effort is normal.      Breath sounds: Normal breath sounds.   Cardiovascular:      Bradycardia present. Regular rhythm.   Pulses:     Intact distal pulses.   Edema:     Peripheral edema absent.   Abdominal:      General: Bowel sounds are normal.      Palpations: Abdomen is soft.   Musculoskeletal: Normal range of motion. Skin:     General: Skin is warm and dry.   Neurological:      General: No focal deficit present.      Mental Status: Oriented to person, place and time.             ECG 12 Lead    Date/Time: 4/27/2023 9:40 AM  Performed by: Fabi Leyva APRN  Authorized by: Fabi Leyva APRN   Comparison: not compared with previous ECG   Previous ECG: no previous ECG available  Rhythm: sinus bradycardia  Rate: bradycardic  BPM: 47              EKG ordered by and reviewed by me in office                    "

## 2023-04-27 NOTE — TELEPHONE ENCOUNTER
Caller: OFFICE    Relationship:     Best call back number: [phone]    What is the best time to reach you: BUSINESS HOURS    Who are you requesting to speak with (clinical staff, provider,  specific staff member): ANYONE    What was the call regarding: A BATA BLOCKER HAS BEEN REQUESTED FOR PATIENT. CALLER STATES THAT THEY DO NOT PRESCRIBE THE BLOCKER PRE SURGERY & NEED LAURE HEWITT TO PRESCRIBE IT FOR THE PT. IF THIS IS NOT POSSIBLE PLEASE LET HER KNOW SO SHE CAN FIGURE OUT NEXT STEPS. HER DIRECT CONTACT NUMBER IS LISTED ABOVE.    Do you require a callback: YES

## 2023-04-28 ENCOUNTER — TELEPHONE (OUTPATIENT)
Dept: CARDIOLOGY | Facility: CLINIC | Age: 63
End: 2023-04-28
Payer: COMMERCIAL

## 2023-04-28 NOTE — TELEPHONE ENCOUNTER
Ref from Fabi Leyva to Dr. Brady.  PAfib, Sinus rates in the 40s. Recent monitor for dizziness showed multiple afib events.      First new patient apt 5/23/23. Will that apt be ok?

## 2023-05-12 RX ORDER — MELOXICAM 15 MG/1
15 TABLET ORAL DAILY PRN
Qty: 30 TABLET | Refills: 1 | Status: SHIPPED | OUTPATIENT
Start: 2023-05-12

## 2023-05-12 RX ORDER — AMIODARONE HYDROCHLORIDE 200 MG/1
200 TABLET ORAL DAILY
Qty: 30 TABLET | Refills: 2 | Status: SHIPPED | OUTPATIENT
Start: 2023-05-12

## 2023-05-16 ENCOUNTER — OFFICE VISIT (OUTPATIENT)
Dept: CARDIOLOGY | Facility: CLINIC | Age: 63
End: 2023-05-16
Payer: COMMERCIAL

## 2023-05-16 ENCOUNTER — PREP FOR SURGERY (OUTPATIENT)
Dept: OTHER | Facility: HOSPITAL | Age: 63
End: 2023-05-16
Payer: COMMERCIAL

## 2023-05-16 VITALS
WEIGHT: 295 LBS | HEIGHT: 70 IN | OXYGEN SATURATION: 96 % | DIASTOLIC BLOOD PRESSURE: 62 MMHG | HEART RATE: 58 BPM | SYSTOLIC BLOOD PRESSURE: 140 MMHG | BODY MASS INDEX: 42.23 KG/M2

## 2023-05-16 DIAGNOSIS — I48.0 PAROXYSMAL ATRIAL FIBRILLATION: Primary | ICD-10-CM

## 2023-05-16 DIAGNOSIS — I49.5 SICK SINUS SYNDROME: ICD-10-CM

## 2023-05-16 NOTE — PROGRESS NOTES
HP      Name: Ty Bergeron ADMIT: (Not on file)   : 1960  PCP: Richard Darnell APRN    MRN: [id number] LOS: 0 days   AGE/SEX: 63 y.o. male  ROOM: Room/bed info not found     Chief Complaint   Patient presents with   • Consult   • Atrial Fibrillation       Subjective        History of present illness  Ty Bergeron is a 64-year-old male patient who has history of hypertension, dyslipidemia, no significant CAD on heart cath in 2022, is referred for evaluation of atrial fibrillation and sick sinus syndrome.  Patient had EKGs which showed sinus bradycardia and for that reason he was placed on a monitor, however the monitor showed episodes of paroxysmal atrial fibrillation.  Patient feels palpitations and shortness of breath which correspond with the episodes of A-fib on the monitor.    Past Medical History:   Diagnosis Date   • Anxiety    • Cholelithiasis    • Depression     History of overdose years ago   • DVT, bilateral lower limbs    • GERD (gastroesophageal reflux disease)    • Hyperlipidemia    • Hypertension    • Obesity    • CASS (obstructive sleep apnea)     not treating   • Pituitary microadenoma    • Testosterone deficiency    • Thrombocytopenia      Past Surgical History:   Procedure Laterality Date   • CARDIAC CATHETERIZATION N/A 2/3/2022    Procedure: Left Heart Cath;  Surgeon: Tevin Meza MD;  Location: [facility] CATH INVASIVE LOCATION;  Service: Cardiology;  Laterality: N/A;   • CARDIAC CATHETERIZATION N/A 2/3/2022    Procedure: Coronary angiography;  Surgeon: Tevin Meza MD;  Location: [facility] CATH INVASIVE LOCATION;  Service: Cardiology;  Laterality: N/A;   • CARDIAC CATHETERIZATION N/A 2/3/2022    Procedure: Left ventriculography;  Surgeon: Tevin Meza MD;  Location: [facility] CATH INVASIVE LOCATION;  Service: Cardiology;  Laterality: N/A;   • COLONOSCOPY      - due 5 years    • HERNIA REPAIR     • SPLENECTOMY     • TOTAL SHOULDER  REPLACEMENT  02/12/2021   • VASECTOMY       Family History   Problem Relation Age of Onset   • Diabetes Father    • Liver disease Father         transplant   • Heart disease Mother    • Diabetes Brother    • Heart disease Brother      Social History     Tobacco Use   • Smoking status: Never     Passive exposure: Never   • Smokeless tobacco: Never   Vaping Use   • Vaping Use: Never used   Substance Use Topics   • Alcohol use: Yes     Alcohol/week: 20.0 standard drinks     Types: 20 Cans of beer per week   • Drug use: No     (Not in a hospital admission)    Allergies:  Lipitor [atorvastatin calcium]    Review of systems    Constitutional: Negative.    Respiratory and cardiovascular: As detailed in HPI section.  Gastrointestinal: Negative for constipation, nausea and vomiting negative for abdominal distention, abdominal pain and diarrhea.   Genitourinary: Negative for difficulty urinating and flank pain.   Musculoskeletal: Negative for arthralgias, joint swelling and myalgias.   Skin: Negative for color change, rash and wound.   Neurological: Negative for dizziness, syncope, weakness and headaches.   Hematological: Negative for adenopathy.   Psychiatric/Behavioral: Negative for confusion.   All other systems reviewed and are negative.    Physical Exam  VITALS REVIEWED    General:      well developed, in no acute distress.    Head:      normocephalic and atraumatic.    Eyes:      PERRL/EOM intact, conjunctiva and sclera clear with out nystagmus.    Neck:      no masses, thyromegaly,  trachea central with normal respiratory effort and PMI displaced laterally  Lungs:      Clear to auscultation bilaterally  Heart:       Regular rate and rhythm  Msk:      no deformity or scoliosis noted of thoracic or lumbar spine.    Pulses:      pulses normal in all 4 extremities.    Extremities:       No lower extremity edema  Neurologic:      no focal deficits.   alert oriented x3  Skin:      intact without lesions or rashes.     Psych:      alert and cooperative; normal mood and affect; normal attention span and concentration.      Result Review :               Pertinent cardiac workup    1. Event monitor for 13 days on 4/5/2023, showed paroxysmal atrial fibrillation, burden of 4%.  Possible atrial flutter as well.  2. EKG 4/27/2023 sinus bradycardia at 47 bpm.  3. EKG 2/27/2023 sinus bradycardia at 45 bpm.  4. Heart cath 2/3/2022, no obstructive CAD.      Procedures        Assessment and Plan      Ty Bergeron is a 63-year-old male patient who has paroxysmal atrial fibrillation, diagnosed in April 2023, sick sinus syndrome with bradycardia seen on multiple EKGs, is referred for rhythm management.  Patient is symptomatic when in atrial fibrillation with palpitations and shortness of breath.  Currently he is on amiodarone, as well as only on low-dose of beta-blocker due to his baseline bradycardia.  I think that the best option for him is to proceed with A-fib ablation since he is still in paroxysmal stages.  It is also likely that he might need a pacemaker due to his bradycardia.  But first we will focus on rhythm restoration and maintenance.  Risks indications and benefits of A-fib ablation were discussed at length with the patient who is agreeable.    Diagnoses and all orders for this visit:    1. Paroxysmal atrial fibrillation (Primary)    2. Sick sinus syndrome           No follow-ups on file.  Patient was given instructions and counseling regarding his condition or for health maintenance advice. Please see specific information pulled into the AVS if appropriate.

## 2023-05-16 NOTE — LETTER
May 16, 2023     VIKTORIYA Garner  [address] [place] [address]    Patient: Ty Bergeron   YOB: 1960   Date of Visit: 2023       Dear VIKTORIYA Meade:    Thank you for referring Ty Bergeron to me for evaluation. Below are the relevant portions of my assessment and plan of care.    If you have questions, please do not hesitate to call me. I look forward to following Ty along with you.         Sincerely,        Sukumar Brady MD        CC: MD Caitlyn Phipps Hrak, MD  23 [phone]  Sign when Signing Visit  HP      Name: Ty Bergeron ADMIT: (Not on file)   : 1960  PCP: Richard Darnell APRN    MRN: [id number] LOS: 0 days   AGE/SEX: 63 y.o. male  ROOM: Room/bed info not found     Chief Complaint   Patient presents with   • Consult   • Atrial Fibrillation       Subjective         History of present illness  Ty Bergeron is a 64-year-old male patient who has history of hypertension, dyslipidemia, no significant CAD on heart cath in 2022, is referred for evaluation of atrial fibrillation and sick sinus syndrome.  Patient had EKGs which showed sinus bradycardia and for that reason he was placed on a monitor, however the monitor showed episodes of paroxysmal atrial fibrillation.  Patient feels palpitations and shortness of breath which correspond with the episodes of A-fib on the monitor.    Past Medical History:   Diagnosis Date   • Anxiety    • Cholelithiasis    • Depression     History of overdose years ago   • DVT, bilateral lower limbs    • GERD (gastroesophageal reflux disease)    • Hyperlipidemia    • Hypertension    • Obesity    • CASS (obstructive sleep apnea)     not treating   • Pituitary microadenoma    • Testosterone deficiency    • Thrombocytopenia      Past Surgical History:   Procedure Laterality Date   • CARDIAC CATHETERIZATION N/A 2/3/2022    Procedure: Left Heart Cath;  Surgeon: Tevin Meza  Kody Lindsay MD;  Location: [facility] CATH INVASIVE LOCATION;  Service: Cardiology;  Laterality: N/A;   • CARDIAC CATHETERIZATION N/A 2/3/2022    Procedure: Coronary angiography;  Surgeon: Tevin Meza MD;  Location: [facility] CATH INVASIVE LOCATION;  Service: Cardiology;  Laterality: N/A;   • CARDIAC CATHETERIZATION N/A 2/3/2022    Procedure: Left ventriculography;  Surgeon: Tevin Meza MD;  Location: [facility] CATH INVASIVE LOCATION;  Service: Cardiology;  Laterality: N/A;   • COLONOSCOPY      2011- due 5 years    • HERNIA REPAIR     • SPLENECTOMY     • TOTAL SHOULDER REPLACEMENT  02/12/2021   • VASECTOMY       Family History   Problem Relation Age of Onset   • Diabetes Father    • Liver disease Father         transplant   • Heart disease Mother    • Diabetes Brother    • Heart disease Brother      Social History     Tobacco Use   • Smoking status: Never     Passive exposure: Never   • Smokeless tobacco: Never   Vaping Use   • Vaping Use: Never used   Substance Use Topics   • Alcohol use: Yes     Alcohol/week: 20.0 standard drinks     Types: 20 Cans of beer per week   • Drug use: No     (Not in a hospital admission)    Allergies:  Lipitor [atorvastatin calcium]    Review of systems    Constitutional: Negative.    Respiratory and cardiovascular: As detailed in HPI section.  Gastrointestinal: Negative for constipation, nausea and vomiting negative for abdominal distention, abdominal pain and diarrhea.   Genitourinary: Negative for difficulty urinating and flank pain.   Musculoskeletal: Negative for arthralgias, joint swelling and myalgias.   Skin: Negative for color change, rash and wound.   Neurological: Negative for dizziness, syncope, weakness and headaches.   Hematological: Negative for adenopathy.   Psychiatric/Behavioral: Negative for confusion.   All other systems reviewed and are negative.    Physical Exam  VITALS REVIEWED    General:      well developed, in no acute distress.    Head:       normocephalic and atraumatic.    Eyes:      PERRL/EOM intact, conjunctiva and sclera clear with out nystagmus.    Neck:      no masses, thyromegaly,  trachea central with normal respiratory effort and PMI displaced laterally  Lungs:      Clear to auscultation bilaterally  Heart:       Regular rate and rhythm  Msk:      no deformity or scoliosis noted of thoracic or lumbar spine.    Pulses:      pulses normal in all 4 extremities.    Extremities:       No lower extremity edema  Neurologic:      no focal deficits.   alert oriented x3  Skin:      intact without lesions or rashes.    Psych:      alert and cooperative; normal mood and affect; normal attention span and concentration.      Result Review :               Pertinent cardiac workup    Event monitor for 13 days on 4/5/2023, showed paroxysmal atrial fibrillation, burden of 4%.  Possible atrial flutter as well.  EKG 4/27/2023 sinus bradycardia at 47 bpm.  EKG 2/27/2023 sinus bradycardia at 45 bpm.  Heart cath 2/3/2022, no obstructive CAD.      Procedures       Assessment and Plan      Ty Bergeron is a 63-year-old male patient who has paroxysmal atrial fibrillation, diagnosed in April 2023, sick sinus syndrome with bradycardia seen on multiple EKGs, is referred for rhythm management.  Patient is symptomatic when in atrial fibrillation with palpitations and shortness of breath.  Currently he is on amiodarone, as well as only on low-dose of beta-blocker due to his baseline bradycardia.  I think that the best option for him is to proceed with A-fib ablation since he is still in paroxysmal stages.  It is also likely that he might need a pacemaker due to his bradycardia.  But first we will focus on rhythm restoration and maintenance.  Risks indications and benefits of A-fib ablation were discussed at length with the patient who is agreeable.    Diagnoses and all orders for this visit:    1. Paroxysmal atrial fibrillation (Primary)    2. Sick sinus syndrome            No follow-ups on file.  Patient was given instructions and counseling regarding his condition or for health maintenance advice. Please see specific information pulled into the AVS if appropriate.

## 2023-05-17 ENCOUNTER — PATIENT ROUNDING (BHMG ONLY) (OUTPATIENT)
Dept: CARDIOLOGY | Facility: CLINIC | Age: 63
End: 2023-05-17
Payer: COMMERCIAL

## 2023-05-22 RX ORDER — LISINOPRIL 40 MG/1
TABLET ORAL
Qty: 30 TABLET | Refills: 2 | Status: SHIPPED | OUTPATIENT
Start: 2023-05-22

## 2023-05-30 RX ORDER — METOPROLOL SUCCINATE 25 MG/1
12.5 TABLET, EXTENDED RELEASE ORAL DAILY
Qty: 30 TABLET | Refills: 11 | Status: SHIPPED | OUTPATIENT
Start: 2023-05-30

## 2023-06-15 ENCOUNTER — TELEPHONE (OUTPATIENT)
Dept: CARDIOLOGY | Facility: CLINIC | Age: 63
End: 2023-06-15

## 2023-06-15 NOTE — TELEPHONE ENCOUNTER
Spoke with patient. I let him know its recommended for him to be bridged with lovenox when off xarelto for surgery.

## 2023-06-15 NOTE — TELEPHONE ENCOUNTER
Caller: Ty Bergeron    Relationship: Self    Best call back number: [phone]    What is the best time to reach you: ANY    What was the call regarding: HAVING A PROCEDURE 7/12 WANTS TO DISCUSS RX OPTIONS.

## 2023-06-21 ENCOUNTER — TELEPHONE (OUTPATIENT)
Dept: CARDIOLOGY | Facility: CLINIC | Age: 63
End: 2023-06-21

## 2023-06-21 NOTE — TELEPHONE ENCOUNTER
DELETE AFTER REVIEWING: Telephone encounter to be sent to the clinical pool.    Caller: MIN    Relationship: PROVIDER        What form or medical record are you requesting: LAST EKG      How would you like to receive the form or medical records (pick-up, mail, fax): FAX  If fax, what is the fax number: [phone]    Timeframe paperwork needed: NEXT FEW DAYS    Additional notes: FOR A SURGERY CLEARANCE- PT MENTIONED TO THEM HE HAD A RECENT EKG-  IT WAS NOT SENT OVER WITH THE CLEARANCE PAPERWORK THEY ALREADY RECEIVED.

## 2023-06-21 NOTE — TELEPHONE ENCOUNTER
Caller: Ty Bergeron    Relationship: Self    Best call back number: [phone]     What is the best time to reach you: ANY    Who are you requesting to speak with (clinical staff, provider,  specific staff member): ANY      What was the call regarding: DR. DINERO OFFICE WOULD LIKE TO KNOW IF PT CAN COME OFF OF ASPRIN FOR 7 Days PRE OP.     Is it okay if the provider responds through MyChart: NO

## 2023-07-21 ENCOUNTER — LAB (OUTPATIENT)
Dept: FAMILY MEDICINE CLINIC | Facility: CLINIC | Age: 63
End: 2023-07-21
Payer: COMMERCIAL

## 2023-07-21 DIAGNOSIS — E29.1 HYPOGONADISM IN MALE: ICD-10-CM

## 2023-07-21 DIAGNOSIS — I10 ESSENTIAL HYPERTENSION: ICD-10-CM

## 2023-07-21 DIAGNOSIS — Z12.5 SCREENING PSA (PROSTATE SPECIFIC ANTIGEN): ICD-10-CM

## 2023-07-21 DIAGNOSIS — E78.00 HYPERCHOLESTEROLEMIA: ICD-10-CM

## 2023-07-21 DIAGNOSIS — Z00.00 ENCOUNTER FOR ANNUAL PHYSICAL EXAM: ICD-10-CM

## 2023-07-21 LAB
ALBUMIN SERPL-MCNC: 4.4 G/DL (ref 3.5–5.2)
ALBUMIN/GLOB SERPL: 1.6 G/DL
ALP SERPL-CCNC: 78 U/L (ref 39–117)
ALT SERPL W P-5'-P-CCNC: 29 U/L (ref 1–41)
ANION GAP SERPL CALCULATED.3IONS-SCNC: 11 MMOL/L (ref 5–15)
AST SERPL-CCNC: 18 U/L (ref 1–40)
BILIRUB SERPL-MCNC: 0.7 MG/DL (ref 0–1.2)
BUN SERPL-MCNC: 26 MG/DL (ref 8–23)
BUN/CREAT SERPL: 24.8 (ref 7–25)
CALCIUM SPEC-SCNC: 9.6 MG/DL (ref 8.6–10.5)
CHLORIDE SERPL-SCNC: 100 MMOL/L (ref 98–107)
CHOLEST SERPL-MCNC: 169 MG/DL (ref 0–200)
CO2 SERPL-SCNC: 24 MMOL/L (ref 22–29)
CREAT SERPL-MCNC: 1.05 MG/DL (ref 0.76–1.27)
DEPRECATED RDW RBC AUTO: 44.1 FL (ref 37–54)
EGFRCR SERPLBLD CKD-EPI 2021: 80.3 ML/MIN/1.73
ERYTHROCYTE [DISTWIDTH] IN BLOOD BY AUTOMATED COUNT: 14.1 % (ref 12.3–15.4)
GLOBULIN UR ELPH-MCNC: 2.7 GM/DL
GLUCOSE SERPL-MCNC: 95 MG/DL (ref 65–99)
HCT VFR BLD AUTO: 34.8 % (ref 37.5–51)
HDLC SERPL-MCNC: 62 MG/DL (ref 40–60)
HGB BLD-MCNC: 11.7 G/DL (ref 13–17.7)
LDLC SERPL CALC-MCNC: 95 MG/DL (ref 0–100)
LDLC/HDLC SERPL: 1.53 {RATIO}
MCH RBC QN AUTO: 29.2 PG (ref 26.6–33)
MCHC RBC AUTO-ENTMCNC: 33.6 G/DL (ref 31.5–35.7)
MCV RBC AUTO: 86.8 FL (ref 79–97)
PLATELET # BLD AUTO: 412 10*3/MM3 (ref 140–450)
PMV BLD AUTO: 10.3 FL (ref 6–12)
POTASSIUM SERPL-SCNC: 5 MMOL/L (ref 3.5–5.2)
PROT SERPL-MCNC: 7.1 G/DL (ref 6–8.5)
PSA SERPL-MCNC: 0.38 NG/ML (ref 0–4)
RBC # BLD AUTO: 4.01 10*6/MM3 (ref 4.14–5.8)
SODIUM SERPL-SCNC: 135 MMOL/L (ref 136–145)
TRIGL SERPL-MCNC: 62 MG/DL (ref 0–150)
VLDLC SERPL-MCNC: 12 MG/DL (ref 5–40)
WBC NRBC COR # BLD: 8.61 10*3/MM3 (ref 3.4–10.8)

## 2023-07-21 PROCEDURE — 84402 ASSAY OF FREE TESTOSTERONE: CPT | Performed by: NURSE PRACTITIONER

## 2023-07-21 PROCEDURE — 84403 ASSAY OF TOTAL TESTOSTERONE: CPT | Performed by: NURSE PRACTITIONER

## 2023-07-21 PROCEDURE — 80053 COMPREHEN METABOLIC PANEL: CPT | Performed by: NURSE PRACTITIONER

## 2023-07-21 PROCEDURE — G0103 PSA SCREENING: HCPCS | Performed by: NURSE PRACTITIONER

## 2023-07-21 PROCEDURE — 80061 LIPID PANEL: CPT | Performed by: NURSE PRACTITIONER

## 2023-07-21 PROCEDURE — 85027 COMPLETE CBC AUTOMATED: CPT | Performed by: NURSE PRACTITIONER

## 2023-07-21 PROCEDURE — 36415 COLL VENOUS BLD VENIPUNCTURE: CPT

## 2023-07-24 RX ORDER — TRAZODONE HYDROCHLORIDE 150 MG/1
TABLET ORAL
Qty: 30 TABLET | Refills: 3 | Status: SHIPPED | OUTPATIENT
Start: 2023-07-24

## 2023-07-28 ENCOUNTER — OFFICE VISIT (OUTPATIENT)
Dept: CARDIOLOGY | Facility: CLINIC | Age: 63
End: 2023-07-28
Payer: COMMERCIAL

## 2023-07-28 VITALS
RESPIRATION RATE: 18 BRPM | DIASTOLIC BLOOD PRESSURE: 79 MMHG | OXYGEN SATURATION: 94 % | HEART RATE: 81 BPM | WEIGHT: 290 LBS | HEIGHT: 70 IN | SYSTOLIC BLOOD PRESSURE: 114 MMHG | BODY MASS INDEX: 41.52 KG/M2

## 2023-07-28 DIAGNOSIS — E78.00 HYPERCHOLESTEROLEMIA: ICD-10-CM

## 2023-07-28 DIAGNOSIS — I48.0 PAROXYSMAL ATRIAL FIBRILLATION: ICD-10-CM

## 2023-07-28 DIAGNOSIS — Z09 HOSPITAL DISCHARGE FOLLOW-UP: Primary | ICD-10-CM

## 2023-07-28 DIAGNOSIS — I10 HYPERTENSION, UNSPECIFIED TYPE: ICD-10-CM

## 2023-07-28 NOTE — PROGRESS NOTES
Cardiology Office Follow Up Visit      Primary Care Provider:  Richard Darnell APRN    Reason for f/u:     Follow up post afib ablation      Subjective     CC:    Follow up post afib ablation    History of Present Illness     Ty Bergeron is a 63 y.o. male who is a patient of Dr. Meza. Pmh includes HTN, HLD, family history of CAD, h/o DVT PE, normal LVEF on ECHO 2/2022, no obstructive CAD on LHC 2/2022, atrial fibrillation on Xarelto.     Mr. Bergeron was seen in office 2/2022 with complaints of dizziness. He was noted to have borderline blood pressure and bradycardia. He was taken off atenolol and amlodipine. A monitor was placed.     Monitor revealed episodes of afib with variable rates. He was started on anticoagulation with Xarelto 20mg daily along with amiodarone 200mg daily and metoprolol tartrate 25mg BID.      He presented in April for follow up regarding monitor results and to discuss pre operative risk assessment prior to knee surgery. He was referred to electrophysiology. He was determined to be at an acceptable risk prior to surgery.    He presents today for follow up. Since his last visit he has undergone an Afib ablation with Dr. Brady and has also had left knee replacement. He tolerated both procedures well. He remains on amiodarone, toprol XL and xarelto. He denies chest pain or shortness of breath. He is in SR today. No groin complications.           ASSESSMENT/PLAN:      Diagnoses and all orders for this visit:    1. Hospital discharge follow-up (Primary)    2. Paroxysmal atrial fibrillation  Comments:  s/p ablation 6/30/2023  [facility] at least 3, on xarelto    3. Hypertension, unspecified type    4. Hypercholesterolemia        Current outpatient and discharge medications have been reconciled for the patient.  Reviewed by: VIKTORIYA Garner     MEDICAL DECISION MAKING:  Mr. Bergeron presents today post afib ablation. He is doing well. He remains on amiodarone 200mg daily,  toprol XL and xarelto for anticoagulation. He is in SR today. He follows up with electrophysiology on Tuesday. He is also s/p knee replacement doing well. We will see him back in 6 mo or sooner should new issues arise.           Past Medical History:   Diagnosis Date    Anxiety     Cholelithiasis     Depression     History of overdose years ago    DVT, bilateral lower limbs     GERD (gastroesophageal reflux disease)     Hyperlipidemia     Hypertension     Obesity     CASS (obstructive sleep apnea)     not treating    Pituitary microadenoma     Testosterone deficiency     Thrombocytopenia        Past Surgical History:   Procedure Laterality Date    CARDIAC CATHETERIZATION N/A 02/03/2022    Procedure: Left Heart Cath;  Surgeon: Tevin Meza MD;  Location: [facility] CATH INVASIVE LOCATION;  Service: Cardiology;  Laterality: N/A;    CARDIAC CATHETERIZATION N/A 02/03/2022    Procedure: Coronary angiography;  Surgeon: Tevin Meza MD;  Location:  [facility] CATH INVASIVE LOCATION;  Service: Cardiology;  Laterality: N/A;    CARDIAC CATHETERIZATION N/A 02/03/2022    Procedure: Left ventriculography;  Surgeon: Tevin Meza MD;  Location: [facility] CATH INVASIVE LOCATION;  Service: Cardiology;  Laterality: N/A;    CARDIAC ELECTROPHYSIOLOGY PROCEDURE N/A 06/30/2023    Procedure: Ablation atrial fibrillation, RF, rep sent email;  Surgeon: Sukumar Brady MD;  Location: [facility] CATH INVASIVE LOCATION;  Service: Cardiovascular;  Laterality: N/A;    COLONOSCOPY      2011- due 5 years     HERNIA REPAIR      INGUINAL HERNIA REPAIR      JOINT REPLACEMENT      SPLENECTOMY      TOTAL KNEE ARTHROPLASTY Left     TOTAL SHOULDER REPLACEMENT  02/12/2021    VASECTOMY           Current Outpatient Medications:     amiodarone (PACERONE) 200 MG tablet, Take 1 tablet by mouth Daily., Disp: 30 tablet, Rfl: 2    cetirizine (zyrTEC) 10 MG tablet, Take 1 tablet by mouth Daily., Disp: , Rfl:     citalopram (CeleXA) 20 MG  tablet, Take 1 tablet by mouth Daily., Disp: 90 tablet, Rfl: 1    HYDROcodone-acetaminophen (NORCO) 5-325 MG per tablet, 1 tablet Every 4 (Four) Hours As Needed for Severe Pain., Disp: , Rfl:     lisinopril (PRINIVIL,ZESTRIL) 40 MG tablet, Take 1 tablet by mouth Daily., Disp: , Rfl:     meloxicam (MOBIC) 15 MG tablet, Take 1 tablet by mouth Daily As Needed for Mild Pain., Disp: 30 tablet, Rfl: 1    metoprolol succinate XL (TOPROL-XL) 25 MG 24 hr tablet, Take 0.5 tablets by mouth Daily., Disp: 30 tablet, Rfl: 11    rivaroxaban (XARELTO) 20 MG tablet, Take 1 tablet by mouth Daily., Disp: , Rfl:     rosuvastatin (CRESTOR) 10 MG tablet, Take 1 tablet by mouth Daily., Disp: 90 tablet, Rfl: 1    sildenafil (VIAGRA) 100 MG tablet, Take 1 tablet by mouth Daily As Needed for Erectile Dysfunction., Disp: , Rfl:     traZODone (DESYREL) 150 MG tablet, TAKE ONE TABLET BY MOUTH EVERY NIGHT AT BEDTIME, Disp: 30 tablet, Rfl: 3    aspirin 81 MG EC tablet, Take 1 tablet by mouth Daily. (Patient not taking: Reported on 7/28/2023), Disp: 30 tablet, Rfl: 11    Social History     Socioeconomic History    Marital status:    Tobacco Use    Smoking status: Never     Passive exposure: Never    Smokeless tobacco: Never   Vaping Use    Vaping Use: Never used   Substance and Sexual Activity    Alcohol use: Yes     Alcohol/week: 20.0 standard drinks     Types: 20 Cans of beer per week    Drug use: No    Sexual activity: Not Currently     Partners: Female     Birth control/protection: Condom       Family History   Problem Relation Age of Onset    Diabetes Father     Liver disease Father         transplant    Heart disease Mother     Diabetes Brother     Heart disease Brother        The following portions of the patient's history were reviewed and updated as appropriate: allergies, current medications, past family history, past medical history, past social history, past surgical history and problem list.    Review of Systems  "  Constitutional: Negative for chills, diaphoresis and malaise/fatigue.   Cardiovascular:  Negative for chest pain, dyspnea on exertion, irregular heartbeat, leg swelling, near-syncope, orthopnea, palpitations, paroxysmal nocturnal dyspnea and syncope.   Respiratory:  Negative for cough, shortness of breath, sleep disturbances due to breathing and sputum production.    Musculoskeletal:  Positive for joint pain, joint swelling and stiffness.   Gastrointestinal:  Negative for change in bowel habit.   Genitourinary:  Negative for urgency.   Neurological:  Negative for dizziness and headaches.   Psychiatric/Behavioral:  Negative for altered mental status.      Pertinent items are noted in HPI, all other systems reviewed and negative    /79 (BP Location: Right arm, Patient Position: Sitting)   Pulse 81   Resp 18   Ht 177.8 cm (70\")   Wt 132 kg (290 lb)   SpO2 94%   BMI 41.61 kg/m² .  Objective     Constitutional:       Appearance: Not in distress.   Neck:      Vascular: JVD normal.   Pulmonary:      Effort: Pulmonary effort is normal.      Breath sounds: Normal breath sounds.   Cardiovascular:      Normal rate. Regular rhythm.   Pulses:     Intact distal pulses.   Edema:     Peripheral edema absent.   Abdominal:      General: Bowel sounds are normal.      Palpations: Abdomen is soft.   Musculoskeletal: Normal range of motion. Skin:     General: Skin is warm and dry.      Comments: Left knee with incision/ steri strips   Neurological:      General: No focal deficit present.      Mental Status: Oriented to person, place and time.           ECG 12 Lead    Date/Time: 7/28/2023 1:07 PM  Performed by: Fabi Leyva APRN  Authorized by: Fabi Leyva APRN   Comparison: not compared with previous ECG   Previous ECG: no previous ECG available  Rhythm: sinus rhythm  Rate: normal  BPM: 81  QRS axis: indeterminate        EKG ordered by and reviewed by me in office             "

## 2023-07-29 LAB
TESTOST FREE SERPL-MCNC: 1.7 PG/ML (ref 6.6–18.1)
TESTOST SERPL-MCNC: 168.4 NG/DL (ref 264–916)

## 2023-07-31 ENCOUNTER — TELEPHONE (OUTPATIENT)
Dept: FAMILY MEDICINE CLINIC | Facility: CLINIC | Age: 63
End: 2023-07-31
Payer: COMMERCIAL

## 2023-07-31 DIAGNOSIS — E29.1 HYPOGONADISM IN MALE: Primary | ICD-10-CM

## 2023-08-01 ENCOUNTER — OFFICE VISIT (OUTPATIENT)
Dept: CARDIOLOGY | Facility: CLINIC | Age: 63
End: 2023-08-01
Payer: COMMERCIAL

## 2023-08-01 VITALS
SYSTOLIC BLOOD PRESSURE: 110 MMHG | DIASTOLIC BLOOD PRESSURE: 79 MMHG | HEIGHT: 70 IN | HEART RATE: 90 BPM | OXYGEN SATURATION: 95 % | BODY MASS INDEX: 41.52 KG/M2 | WEIGHT: 290 LBS

## 2023-08-01 DIAGNOSIS — I48.0 PAROXYSMAL ATRIAL FIBRILLATION: Primary | ICD-10-CM

## 2023-08-01 DIAGNOSIS — I49.5 SICK SINUS SYNDROME: ICD-10-CM

## 2023-08-01 PROCEDURE — 99213 OFFICE O/P EST LOW 20 MIN: CPT | Performed by: INTERNAL MEDICINE

## 2023-08-01 RX ORDER — METOPROLOL SUCCINATE 25 MG/1
25 TABLET, EXTENDED RELEASE ORAL DAILY
Qty: 30 TABLET | Refills: 11
Start: 2023-08-01

## 2023-08-04 ENCOUNTER — LAB (OUTPATIENT)
Dept: FAMILY MEDICINE CLINIC | Facility: CLINIC | Age: 63
End: 2023-08-04
Payer: COMMERCIAL

## 2023-08-04 DIAGNOSIS — E29.1 HYPOGONADISM IN MALE: ICD-10-CM

## 2023-08-04 PROCEDURE — 84403 ASSAY OF TOTAL TESTOSTERONE: CPT | Performed by: NURSE PRACTITIONER

## 2023-08-04 PROCEDURE — 84402 ASSAY OF FREE TESTOSTERONE: CPT | Performed by: NURSE PRACTITIONER

## 2023-08-04 PROCEDURE — 36415 COLL VENOUS BLD VENIPUNCTURE: CPT

## 2023-08-08 ENCOUNTER — TELEPHONE (OUTPATIENT)
Dept: CARDIOLOGY | Facility: CLINIC | Age: 63
End: 2023-08-08
Payer: COMMERCIAL

## 2023-08-08 NOTE — TELEPHONE ENCOUNTER
Patient called stating he had a knee replacement and asked if there was an over the counter pain reliever he could take example: Tylenol or aleve.

## 2023-08-09 ENCOUNTER — HOSPITAL ENCOUNTER (OUTPATIENT)
Facility: HOSPITAL | Age: 63
Setting detail: OBSERVATION
Discharge: HOME | End: 2023-08-11
Attending: EMERGENCY MEDICINE | Admitting: EMERGENCY MEDICINE
Payer: COMMERCIAL

## 2023-08-09 ENCOUNTER — APPOINTMENT (OUTPATIENT)
Dept: CT IMAGING | Facility: HOSPITAL | Age: 63
End: 2023-08-09
Payer: COMMERCIAL

## 2023-08-09 DIAGNOSIS — N20.1 URETERAL CALCULUS, RIGHT: Primary | ICD-10-CM

## 2023-08-09 DIAGNOSIS — N20.1 URETEROLITHIASIS: ICD-10-CM

## 2023-08-09 DIAGNOSIS — N20.1 RIGHT URETERAL CALCULUS: ICD-10-CM

## 2023-08-09 DIAGNOSIS — N13.2 HYDRONEPHROSIS WITH URINARY OBSTRUCTION DUE TO URETERAL CALCULUS: ICD-10-CM

## 2023-08-09 DIAGNOSIS — N23 RENAL COLIC ON RIGHT SIDE: ICD-10-CM

## 2023-08-09 DIAGNOSIS — K80.20 CALCULUS OF GALLBLADDER WITHOUT CHOLECYSTITIS WITHOUT OBSTRUCTION: ICD-10-CM

## 2023-08-09 LAB
ALBUMIN SERPL-MCNC: 4 G/DL (ref 3.5–5.2)
ALBUMIN/GLOB SERPL: 1.6 G/DL
ALP SERPL-CCNC: 85 U/L (ref 39–117)
ALT SERPL W P-5'-P-CCNC: 18 U/L (ref 1–41)
ANION GAP SERPL CALCULATED.3IONS-SCNC: 14 MMOL/L (ref 5–15)
AST SERPL-CCNC: 20 U/L (ref 1–40)
BACTERIA UR QL AUTO: ABNORMAL /HPF
BASOPHILS # BLD AUTO: 0.2 10*3/MM3 (ref 0–0.2)
BASOPHILS NFR BLD AUTO: 1.7 % (ref 0–1.5)
BILIRUB SERPL-MCNC: 0.4 MG/DL (ref 0–1.2)
BILIRUB UR QL STRIP: NEGATIVE
BUN SERPL-MCNC: 24 MG/DL (ref 8–23)
BUN/CREAT SERPL: 16.1 (ref 7–25)
CALCIUM SPEC-SCNC: 9.2 MG/DL (ref 8.6–10.5)
CHLORIDE SERPL-SCNC: 100 MMOL/L (ref 98–107)
CLARITY UR: ABNORMAL
CO2 SERPL-SCNC: 21 MMOL/L (ref 22–29)
COD CRY URNS QL: ABNORMAL /HPF
COLOR UR: ABNORMAL
CREAT SERPL-MCNC: 1.49 MG/DL (ref 0.76–1.27)
DEPRECATED RDW RBC AUTO: 54.7 FL (ref 37–54)
EGFRCR SERPLBLD CKD-EPI 2021: 52.7 ML/MIN/1.73
EOSINOPHIL # BLD AUTO: 0.1 10*3/MM3 (ref 0–0.4)
EOSINOPHIL NFR BLD AUTO: 1.2 % (ref 0.3–6.2)
ERYTHROCYTE [DISTWIDTH] IN BLOOD BY AUTOMATED COUNT: 16.6 % (ref 12.3–15.4)
GLOBULIN UR ELPH-MCNC: 2.5 GM/DL
GLUCOSE SERPL-MCNC: 113 MG/DL (ref 65–99)
GLUCOSE UR STRIP-MCNC: NEGATIVE MG/DL
HCT VFR BLD AUTO: 35.4 % (ref 37.5–51)
HGB BLD-MCNC: 11.6 G/DL (ref 13–17.7)
HGB UR QL STRIP.AUTO: ABNORMAL
HYALINE CASTS UR QL AUTO: ABNORMAL /LPF
KETONES UR QL STRIP: ABNORMAL
LEUKOCYTE ESTERASE UR QL STRIP.AUTO: ABNORMAL
LYMPHOCYTES # BLD AUTO: 0.7 10*3/MM3 (ref 0.7–3.1)
LYMPHOCYTES NFR BLD AUTO: 7.4 % (ref 19.6–45.3)
MCH RBC QN AUTO: 29.3 PG (ref 26.6–33)
MCHC RBC AUTO-ENTMCNC: 32.7 G/DL (ref 31.5–35.7)
MCV RBC AUTO: 89.5 FL (ref 79–97)
MONOCYTES # BLD AUTO: 0.9 10*3/MM3 (ref 0.1–0.9)
MONOCYTES NFR BLD AUTO: 9.2 % (ref 5–12)
NEUTROPHILS NFR BLD AUTO: 7.7 10*3/MM3 (ref 1.7–7)
NEUTROPHILS NFR BLD AUTO: 80.5 % (ref 42.7–76)
NITRITE UR QL STRIP: NEGATIVE
NRBC BLD AUTO-RTO: 0.1 /100 WBC (ref 0–0.2)
PH UR STRIP.AUTO: 6 [PH] (ref 5–8)
PLATELET # BLD AUTO: 176 10*3/MM3 (ref 140–450)
PMV BLD AUTO: 9.9 FL (ref 6–12)
POTASSIUM SERPL-SCNC: 4.1 MMOL/L (ref 3.5–5.2)
PROT SERPL-MCNC: 6.5 G/DL (ref 6–8.5)
PROT UR QL STRIP: ABNORMAL
RBC # BLD AUTO: 3.95 10*6/MM3 (ref 4.14–5.8)
RBC # UR STRIP: ABNORMAL /HPF
REF LAB TEST METHOD: ABNORMAL
SODIUM SERPL-SCNC: 135 MMOL/L (ref 136–145)
SP GR UR STRIP: 1.03 (ref 1–1.03)
SQUAMOUS #/AREA URNS HPF: ABNORMAL /HPF
UROBILINOGEN UR QL STRIP: ABNORMAL
WBC # UR STRIP: ABNORMAL /HPF
WBC NRBC COR # BLD: 9.6 10*3/MM3 (ref 3.4–10.8)

## 2023-08-09 PROCEDURE — 74176 CT ABD & PELVIS W/O CONTRAST: CPT

## 2023-08-09 PROCEDURE — G0378 HOSPITAL OBSERVATION PER HR: HCPCS

## 2023-08-09 PROCEDURE — 25010000002 KETOROLAC TROMETHAMINE PER 15 MG: Performed by: EMERGENCY MEDICINE

## 2023-08-09 PROCEDURE — 81001 URINALYSIS AUTO W/SCOPE: CPT | Performed by: EMERGENCY MEDICINE

## 2023-08-09 PROCEDURE — 96365 THER/PROPH/DIAG IV INF INIT: CPT

## 2023-08-09 PROCEDURE — 25010000002 ONDANSETRON PER 1 MG: Performed by: EMERGENCY MEDICINE

## 2023-08-09 PROCEDURE — 99284 EMERGENCY DEPT VISIT MOD MDM: CPT

## 2023-08-09 PROCEDURE — 96375 TX/PRO/DX INJ NEW DRUG ADDON: CPT

## 2023-08-09 PROCEDURE — 25010000002 CEFTRIAXONE PER 250 MG: Performed by: EMERGENCY MEDICINE

## 2023-08-09 PROCEDURE — 25010000002 HYDROMORPHONE 1 MG/ML SOLUTION: Performed by: EMERGENCY MEDICINE

## 2023-08-09 PROCEDURE — 80053 COMPREHEN METABOLIC PANEL: CPT | Performed by: EMERGENCY MEDICINE

## 2023-08-09 PROCEDURE — 87086 URINE CULTURE/COLONY COUNT: CPT | Performed by: EMERGENCY MEDICINE

## 2023-08-09 PROCEDURE — 85025 COMPLETE CBC W/AUTO DIFF WBC: CPT | Performed by: EMERGENCY MEDICINE

## 2023-08-09 RX ORDER — CHOLECALCIFEROL (VITAMIN D3) 125 MCG
5 CAPSULE ORAL NIGHTLY PRN
Status: DISCONTINUED | OUTPATIENT
Start: 2023-08-09 | End: 2023-08-11 | Stop reason: HOSPADM

## 2023-08-09 RX ORDER — AMOXICILLIN 250 MG
2 CAPSULE ORAL 2 TIMES DAILY
Status: DISCONTINUED | OUTPATIENT
Start: 2023-08-10 | End: 2023-08-11 | Stop reason: HOSPADM

## 2023-08-09 RX ORDER — ONDANSETRON 2 MG/ML
4 INJECTION INTRAMUSCULAR; INTRAVENOUS ONCE
Status: COMPLETED | OUTPATIENT
Start: 2023-08-09 | End: 2023-08-09

## 2023-08-09 RX ORDER — MELOXICAM 15 MG/1
15 TABLET ORAL DAILY PRN
Qty: 30 TABLET | Refills: 1 | Status: SHIPPED | OUTPATIENT
Start: 2023-08-09

## 2023-08-09 RX ORDER — BISACODYL 5 MG/1
5 TABLET, DELAYED RELEASE ORAL DAILY PRN
Status: DISCONTINUED | OUTPATIENT
Start: 2023-08-09 | End: 2023-08-11 | Stop reason: HOSPADM

## 2023-08-09 RX ORDER — SODIUM CHLORIDE 9 MG/ML
40 INJECTION, SOLUTION INTRAVENOUS AS NEEDED
Status: DISCONTINUED | OUTPATIENT
Start: 2023-08-09 | End: 2023-08-11 | Stop reason: HOSPADM

## 2023-08-09 RX ORDER — SODIUM CHLORIDE 450 MG/100ML
100 INJECTION, SOLUTION INTRAVENOUS CONTINUOUS
Status: DISCONTINUED | OUTPATIENT
Start: 2023-08-09 | End: 2023-08-11 | Stop reason: HOSPADM

## 2023-08-09 RX ORDER — ONDANSETRON 2 MG/ML
4 INJECTION INTRAMUSCULAR; INTRAVENOUS EVERY 6 HOURS PRN
Status: DISCONTINUED | OUTPATIENT
Start: 2023-08-09 | End: 2023-08-11 | Stop reason: HOSPADM

## 2023-08-09 RX ORDER — POLYETHYLENE GLYCOL 3350 17 G/17G
17 POWDER, FOR SOLUTION ORAL DAILY PRN
Status: DISCONTINUED | OUTPATIENT
Start: 2023-08-09 | End: 2023-08-11 | Stop reason: HOSPADM

## 2023-08-09 RX ORDER — BISACODYL 10 MG
10 SUPPOSITORY, RECTAL RECTAL DAILY PRN
Status: DISCONTINUED | OUTPATIENT
Start: 2023-08-09 | End: 2023-08-11 | Stop reason: HOSPADM

## 2023-08-09 RX ORDER — SODIUM CHLORIDE 0.9 % (FLUSH) 0.9 %
10 SYRINGE (ML) INJECTION EVERY 12 HOURS SCHEDULED
Status: DISCONTINUED | OUTPATIENT
Start: 2023-08-09 | End: 2023-08-11 | Stop reason: HOSPADM

## 2023-08-09 RX ORDER — KETOROLAC TROMETHAMINE 15 MG/ML
15 INJECTION, SOLUTION INTRAMUSCULAR; INTRAVENOUS ONCE
Status: COMPLETED | OUTPATIENT
Start: 2023-08-09 | End: 2023-08-09

## 2023-08-09 RX ORDER — SODIUM CHLORIDE 0.9 % (FLUSH) 0.9 %
10 SYRINGE (ML) INJECTION AS NEEDED
Status: DISCONTINUED | OUTPATIENT
Start: 2023-08-09 | End: 2023-08-11 | Stop reason: HOSPADM

## 2023-08-09 RX ORDER — METOPROLOL SUCCINATE 25 MG/1
25 TABLET, EXTENDED RELEASE ORAL DAILY
Qty: 30 TABLET | Refills: 11
Start: 2023-08-09

## 2023-08-09 RX ORDER — ACETAMINOPHEN 325 MG/1
650 TABLET ORAL EVERY 4 HOURS PRN
Status: DISCONTINUED | OUTPATIENT
Start: 2023-08-09 | End: 2023-08-11 | Stop reason: HOSPADM

## 2023-08-09 RX ADMIN — ONDANSETRON 4 MG: 2 INJECTION INTRAMUSCULAR; INTRAVENOUS at 20:36

## 2023-08-09 RX ADMIN — SODIUM CHLORIDE 500 ML: 9 INJECTION, SOLUTION INTRAVENOUS at 22:46

## 2023-08-09 RX ADMIN — SODIUM CHLORIDE, POTASSIUM CHLORIDE, SODIUM LACTATE AND CALCIUM CHLORIDE 1000 ML: 600; 310; 30; 20 INJECTION, SOLUTION INTRAVENOUS at 20:40

## 2023-08-09 RX ADMIN — CEFTRIAXONE 2000 MG: 2 INJECTION, POWDER, FOR SOLUTION INTRAMUSCULAR; INTRAVENOUS at 22:39

## 2023-08-09 RX ADMIN — KETOROLAC TROMETHAMINE 15 MG: 15 INJECTION, SOLUTION INTRAMUSCULAR; INTRAVENOUS at 20:37

## 2023-08-09 RX ADMIN — HYDROMORPHONE HYDROCHLORIDE 0.5 MG: 1 INJECTION, SOLUTION INTRAMUSCULAR; INTRAVENOUS; SUBCUTANEOUS at 20:38

## 2023-08-09 NOTE — TELEPHONE ENCOUNTER
Rx Refill Note  Requested Prescriptions     Signed Prescriptions Disp Refills    metoprolol succinate XL (TOPROL-XL) 25 MG 24 hr tablet 30 tablet 11     Sig: Take 1 tablet by mouth Daily.     Authorizing Provider: TONYA NAILS     Ordering User: CORRIE ROGEL      Last office visit with prescribing clinician: 8/1/2023   Last telemedicine visit with prescribing clinician: Visit date not found   Next office visit with prescribing clinician: 8/6/2024                           Corrie Rogel MA  08/09/23, 14:26 EDT

## 2023-08-10 ENCOUNTER — ANESTHESIA (OUTPATIENT)
Dept: PERIOP | Facility: HOSPITAL | Age: 63
End: 2023-08-10
Payer: COMMERCIAL

## 2023-08-10 ENCOUNTER — ANESTHESIA EVENT (OUTPATIENT)
Dept: PERIOP | Facility: HOSPITAL | Age: 63
End: 2023-08-10
Payer: COMMERCIAL

## 2023-08-10 PROBLEM — N20.1 URETEROLITHIASIS: Status: RESOLVED | Noted: 2023-08-09 | Resolved: 2023-08-10

## 2023-08-10 LAB
ANION GAP SERPL CALCULATED.3IONS-SCNC: 10 MMOL/L (ref 5–15)
BACTERIA SPEC AEROBE CULT: NO GROWTH
BASOPHILS # BLD AUTO: 0.1 10*3/MM3 (ref 0–0.2)
BASOPHILS NFR BLD AUTO: 1.4 % (ref 0–1.5)
BUN SERPL-MCNC: 24 MG/DL (ref 8–23)
BUN/CREAT SERPL: 14.5 (ref 7–25)
CALCIUM SPEC-SCNC: 8.5 MG/DL (ref 8.6–10.5)
CHLORIDE SERPL-SCNC: 103 MMOL/L (ref 98–107)
CO2 SERPL-SCNC: 23 MMOL/L (ref 22–29)
CREAT SERPL-MCNC: 1.66 MG/DL (ref 0.76–1.27)
DEPRECATED RDW RBC AUTO: 52.1 FL (ref 37–54)
EGFRCR SERPLBLD CKD-EPI 2021: 46.3 ML/MIN/1.73
EOSINOPHIL # BLD AUTO: 0.2 10*3/MM3 (ref 0–0.4)
EOSINOPHIL NFR BLD AUTO: 2.4 % (ref 0.3–6.2)
ERYTHROCYTE [DISTWIDTH] IN BLOOD BY AUTOMATED COUNT: 16.5 % (ref 12.3–15.4)
GLUCOSE SERPL-MCNC: 97 MG/DL (ref 65–99)
HCT VFR BLD AUTO: 33.7 % (ref 37.5–51)
HGB BLD-MCNC: 11 G/DL (ref 13–17.7)
LYMPHOCYTES # BLD AUTO: 1.1 10*3/MM3 (ref 0.7–3.1)
LYMPHOCYTES NFR BLD AUTO: 13.8 % (ref 19.6–45.3)
MCH RBC QN AUTO: 29.8 PG (ref 26.6–33)
MCHC RBC AUTO-ENTMCNC: 32.7 G/DL (ref 31.5–35.7)
MCV RBC AUTO: 91 FL (ref 79–97)
MONOCYTES # BLD AUTO: 1.2 10*3/MM3 (ref 0.1–0.9)
MONOCYTES NFR BLD AUTO: 14.7 % (ref 5–12)
NEUTROPHILS NFR BLD AUTO: 5.4 10*3/MM3 (ref 1.7–7)
NEUTROPHILS NFR BLD AUTO: 67.7 % (ref 42.7–76)
NRBC BLD AUTO-RTO: 0 /100 WBC (ref 0–0.2)
PLATELET # BLD AUTO: 163 10*3/MM3 (ref 140–450)
PMV BLD AUTO: 10.1 FL (ref 6–12)
POTASSIUM SERPL-SCNC: 4.2 MMOL/L (ref 3.5–5.2)
RBC # BLD AUTO: 3.71 10*6/MM3 (ref 4.14–5.8)
SODIUM SERPL-SCNC: 136 MMOL/L (ref 136–145)
WBC NRBC COR # BLD: 8 10*3/MM3 (ref 3.4–10.8)

## 2023-08-10 PROCEDURE — 25010000002 ONDANSETRON PER 1 MG: Performed by: NURSE ANESTHETIST, CERTIFIED REGISTERED

## 2023-08-10 PROCEDURE — 25010000002 PROPOFOL 200 MG/20ML EMULSION: Performed by: NURSE ANESTHETIST, CERTIFIED REGISTERED

## 2023-08-10 PROCEDURE — 25010000002 CEFAZOLIN PER 500 MG: Performed by: UROLOGY

## 2023-08-10 PROCEDURE — 25010000002 DEXAMETHASONE PER 1 MG: Performed by: NURSE ANESTHETIST, CERTIFIED REGISTERED

## 2023-08-10 PROCEDURE — C1769 GUIDE WIRE: HCPCS | Performed by: UROLOGY

## 2023-08-10 PROCEDURE — 25010000002 HYDROMORPHONE 1 MG/ML SOLUTION: Performed by: NURSE ANESTHETIST, CERTIFIED REGISTERED

## 2023-08-10 PROCEDURE — C2617 STENT, NON-COR, TEM W/O DEL: HCPCS | Performed by: UROLOGY

## 2023-08-10 PROCEDURE — 85025 COMPLETE CBC W/AUTO DIFF WBC: CPT | Performed by: EMERGENCY MEDICINE

## 2023-08-10 PROCEDURE — G0378 HOSPITAL OBSERVATION PER HR: HCPCS

## 2023-08-10 PROCEDURE — C1758 CATHETER, URETERAL: HCPCS | Performed by: UROLOGY

## 2023-08-10 PROCEDURE — 0 IOHEXOL 300 MG/ML SOLUTION: Performed by: UROLOGY

## 2023-08-10 PROCEDURE — 25010000002 FENTANYL CITRATE (PF) 100 MCG/2ML SOLUTION: Performed by: NURSE ANESTHETIST, CERTIFIED REGISTERED

## 2023-08-10 PROCEDURE — 82365 CALCULUS SPECTROSCOPY: CPT | Performed by: UROLOGY

## 2023-08-10 PROCEDURE — 80048 BASIC METABOLIC PNL TOTAL CA: CPT | Performed by: EMERGENCY MEDICINE

## 2023-08-10 DEVICE — URETERAL STENT
Type: IMPLANTABLE DEVICE | Site: URETER | Status: FUNCTIONAL
Brand: PERCUFLEX™ PLUS

## 2023-08-10 RX ORDER — ONDANSETRON 2 MG/ML
INJECTION INTRAMUSCULAR; INTRAVENOUS AS NEEDED
Status: DISCONTINUED | OUTPATIENT
Start: 2023-08-10 | End: 2023-08-10 | Stop reason: SURG

## 2023-08-10 RX ORDER — OXYCODONE HYDROCHLORIDE 5 MG/1
5 TABLET ORAL ONCE AS NEEDED
Status: DISCONTINUED | OUTPATIENT
Start: 2023-08-10 | End: 2023-08-10 | Stop reason: HOSPADM

## 2023-08-10 RX ORDER — ROSUVASTATIN CALCIUM 10 MG/1
10 TABLET, COATED ORAL NIGHTLY
Status: DISCONTINUED | OUTPATIENT
Start: 2023-08-10 | End: 2023-08-11 | Stop reason: HOSPADM

## 2023-08-10 RX ORDER — DIPHENHYDRAMINE HYDROCHLORIDE 50 MG/ML
12.5 INJECTION INTRAMUSCULAR; INTRAVENOUS
Status: DISCONTINUED | OUTPATIENT
Start: 2023-08-10 | End: 2023-08-10 | Stop reason: HOSPADM

## 2023-08-10 RX ORDER — LISINOPRIL 20 MG/1
40 TABLET ORAL DAILY
Status: DISCONTINUED | OUTPATIENT
Start: 2023-08-10 | End: 2023-08-11 | Stop reason: HOSPADM

## 2023-08-10 RX ORDER — HYDROCODONE BITARTRATE AND ACETAMINOPHEN 10; 325 MG/1; MG/1
1 TABLET ORAL EVERY 6 HOURS PRN
Qty: 30 TABLET | Refills: 0 | Status: SHIPPED | OUTPATIENT
Start: 2023-08-10

## 2023-08-10 RX ORDER — DEXAMETHASONE SODIUM PHOSPHATE 4 MG/ML
INJECTION, SOLUTION INTRA-ARTICULAR; INTRALESIONAL; INTRAMUSCULAR; INTRAVENOUS; SOFT TISSUE AS NEEDED
Status: DISCONTINUED | OUTPATIENT
Start: 2023-08-10 | End: 2023-08-10 | Stop reason: SURG

## 2023-08-10 RX ORDER — DIPHENHYDRAMINE HYDROCHLORIDE 50 MG/ML
12.5 INJECTION INTRAMUSCULAR; INTRAVENOUS ONCE AS NEEDED
Status: DISCONTINUED | OUTPATIENT
Start: 2023-08-10 | End: 2023-08-10 | Stop reason: HOSPADM

## 2023-08-10 RX ORDER — CEFDINIR 300 MG/1
300 CAPSULE ORAL 2 TIMES DAILY
Qty: 20 CAPSULE | Refills: 0 | Status: SHIPPED | OUTPATIENT
Start: 2023-08-10

## 2023-08-10 RX ORDER — EPHEDRINE SULFATE 5 MG/ML
5 INJECTION INTRAVENOUS ONCE AS NEEDED
Status: DISCONTINUED | OUTPATIENT
Start: 2023-08-10 | End: 2023-08-10 | Stop reason: HOSPADM

## 2023-08-10 RX ORDER — ASPIRIN 81 MG/1
81 TABLET ORAL DAILY
Status: DISCONTINUED | OUTPATIENT
Start: 2023-08-10 | End: 2023-08-11 | Stop reason: HOSPADM

## 2023-08-10 RX ORDER — ONDANSETRON 2 MG/ML
4 INJECTION INTRAMUSCULAR; INTRAVENOUS ONCE AS NEEDED
Status: DISCONTINUED | OUTPATIENT
Start: 2023-08-10 | End: 2023-08-10 | Stop reason: HOSPADM

## 2023-08-10 RX ORDER — FENTANYL CITRATE 50 UG/ML
INJECTION, SOLUTION INTRAMUSCULAR; INTRAVENOUS AS NEEDED
Status: DISCONTINUED | OUTPATIENT
Start: 2023-08-10 | End: 2023-08-10 | Stop reason: SURG

## 2023-08-10 RX ORDER — HYDRALAZINE HYDROCHLORIDE 20 MG/ML
5 INJECTION INTRAMUSCULAR; INTRAVENOUS
Status: DISCONTINUED | OUTPATIENT
Start: 2023-08-10 | End: 2023-08-10 | Stop reason: HOSPADM

## 2023-08-10 RX ORDER — IPRATROPIUM BROMIDE AND ALBUTEROL SULFATE 2.5; .5 MG/3ML; MG/3ML
3 SOLUTION RESPIRATORY (INHALATION) ONCE AS NEEDED
Status: DISCONTINUED | OUTPATIENT
Start: 2023-08-10 | End: 2023-08-10 | Stop reason: HOSPADM

## 2023-08-10 RX ORDER — METOPROLOL SUCCINATE 25 MG/1
25 TABLET, EXTENDED RELEASE ORAL DAILY
Status: DISCONTINUED | OUTPATIENT
Start: 2023-08-10 | End: 2023-08-11 | Stop reason: HOSPADM

## 2023-08-10 RX ORDER — PHENYLEPHRINE HCL IN 0.9% NACL 1 MG/10 ML
SYRINGE (ML) INTRAVENOUS AS NEEDED
Status: DISCONTINUED | OUTPATIENT
Start: 2023-08-10 | End: 2023-08-10 | Stop reason: SURG

## 2023-08-10 RX ORDER — PROPOFOL 10 MG/ML
INJECTION, EMULSION INTRAVENOUS AS NEEDED
Status: DISCONTINUED | OUTPATIENT
Start: 2023-08-10 | End: 2023-08-10 | Stop reason: SURG

## 2023-08-10 RX ORDER — LABETALOL HYDROCHLORIDE 5 MG/ML
5 INJECTION, SOLUTION INTRAVENOUS
Status: DISCONTINUED | OUTPATIENT
Start: 2023-08-10 | End: 2023-08-10 | Stop reason: HOSPADM

## 2023-08-10 RX ORDER — OXYCODONE HYDROCHLORIDE 5 MG/1
10 TABLET ORAL EVERY 4 HOURS PRN
Status: DISCONTINUED | OUTPATIENT
Start: 2023-08-10 | End: 2023-08-10 | Stop reason: HOSPADM

## 2023-08-10 RX ORDER — CITALOPRAM 20 MG/1
20 TABLET ORAL DAILY
Status: DISCONTINUED | OUTPATIENT
Start: 2023-08-10 | End: 2023-08-11 | Stop reason: HOSPADM

## 2023-08-10 RX ORDER — NALOXONE HCL 0.4 MG/ML
0.4 VIAL (ML) INJECTION AS NEEDED
Status: DISCONTINUED | OUTPATIENT
Start: 2023-08-10 | End: 2023-08-10 | Stop reason: HOSPADM

## 2023-08-10 RX ORDER — LIDOCAINE HYDROCHLORIDE 10 MG/ML
INJECTION, SOLUTION EPIDURAL; INFILTRATION; INTRACAUDAL; PERINEURAL AS NEEDED
Status: DISCONTINUED | OUTPATIENT
Start: 2023-08-10 | End: 2023-08-10 | Stop reason: SURG

## 2023-08-10 RX ADMIN — FENTANYL CITRATE 50 MCG: 50 INJECTION, SOLUTION INTRAMUSCULAR; INTRAVENOUS at 13:57

## 2023-08-10 RX ADMIN — CITALOPRAM HYDROBROMIDE 20 MG: 20 TABLET ORAL at 10:20

## 2023-08-10 RX ADMIN — ACETAMINOPHEN 650 MG: 325 TABLET, FILM COATED ORAL at 10:20

## 2023-08-10 RX ADMIN — METOPROLOL SUCCINATE 25 MG: 25 TABLET, FILM COATED, EXTENDED RELEASE ORAL at 10:20

## 2023-08-10 RX ADMIN — SODIUM CHLORIDE 100 ML/HR: 9 INJECTION, SOLUTION INTRAVENOUS at 13:32

## 2023-08-10 RX ADMIN — SODIUM CHLORIDE 100 ML/HR: 4.5 INJECTION, SOLUTION INTRAVENOUS at 17:48

## 2023-08-10 RX ADMIN — FENTANYL CITRATE 50 MCG: 50 INJECTION, SOLUTION INTRAMUSCULAR; INTRAVENOUS at 13:36

## 2023-08-10 RX ADMIN — CEFAZOLIN 2000 MG: 2 INJECTION, POWDER, FOR SOLUTION INTRAMUSCULAR; INTRAVENOUS at 21:32

## 2023-08-10 RX ADMIN — ONDANSETRON 4 MG: 2 INJECTION INTRAMUSCULAR; INTRAVENOUS at 13:45

## 2023-08-10 RX ADMIN — ROSUVASTATIN 10 MG: 10 TABLET, FILM COATED ORAL at 21:33

## 2023-08-10 RX ADMIN — CEFAZOLIN 2000 MG: 2 INJECTION, POWDER, FOR SOLUTION INTRAMUSCULAR; INTRAVENOUS at 13:41

## 2023-08-10 RX ADMIN — LISINOPRIL 40 MG: 20 TABLET ORAL at 10:20

## 2023-08-10 RX ADMIN — OXYCODONE HYDROCHLORIDE 10 MG: 5 TABLET ORAL at 15:00

## 2023-08-10 RX ADMIN — Medication 10 ML: at 00:20

## 2023-08-10 RX ADMIN — PROPOFOL 230 MG: 10 INJECTION, EMULSION INTRAVENOUS at 13:36

## 2023-08-10 RX ADMIN — HYDROMORPHONE HYDROCHLORIDE 1 MG: 1 INJECTION, SOLUTION INTRAMUSCULAR; INTRAVENOUS; SUBCUTANEOUS at 14:35

## 2023-08-10 RX ADMIN — SODIUM CHLORIDE 100 ML/HR: 4.5 INJECTION, SOLUTION INTRAVENOUS at 00:21

## 2023-08-10 RX ADMIN — LIDOCAINE HYDROCHLORIDE 50 MG: 10 INJECTION, SOLUTION EPIDURAL; INFILTRATION; INTRACAUDAL; PERINEURAL at 13:36

## 2023-08-10 RX ADMIN — ASPIRIN 81 MG: 81 TABLET, COATED ORAL at 10:20

## 2023-08-10 RX ADMIN — DEXAMETHASONE SODIUM PHOSPHATE 4 MG: 4 INJECTION, SOLUTION INTRAMUSCULAR; INTRAVENOUS at 13:45

## 2023-08-10 RX ADMIN — Medication 10 ML: at 21:33

## 2023-08-10 RX ADMIN — Medication 200 MCG: at 13:46

## 2023-08-10 NOTE — ANESTHESIA PREPROCEDURE EVALUATION
Anesthesia Evaluation     NPO Solid Status: > 8 hours  NPO Liquid Status: > 8 hours           Airway   Mallampati: II  TM distance: >3 FB  Neck ROM: full  No difficulty expected  Dental - normal exam     Pulmonary - normal exam   (+) ,sleep apnea  Cardiovascular - normal exam    (+) hypertension well controlleddysrhythmias Atrial Fib, angina, DVT, hyperlipidemia      Neuro/Psych  (+) numbness, psychiatric history Anxiety  GI/Hepatic/Renal/Endo    (+) morbid obesity, GERD well controlled    Musculoskeletal     (+) neck pain  Abdominal  - normal exam    Bowel sounds: normal.   Substance History      OB/GYN          Other   arthritis,                   Anesthesia Plan    ASA 3     general     intravenous induction     Anesthetic plan, risks, benefits, and alternatives have been provided, discussed and informed consent has been obtained with: patient.  Pre-procedure education provided  Plan discussed with CRNA.    CODE STATUS:    Code Status (Patient has no pulse and is not breathing): CPR (Attempt to Resuscitate)  Medical Interventions (Patient has pulse or is breathing): Full Support

## 2023-08-10 NOTE — CASE MANAGEMENT/SOCIAL WORK
Continued Stay Note  [facility]     Patient Name: Ty Bergeron  MRN: [id number]  Today's Date: 8/10/2023    Admit Date: 8/9/2023    Plan: Needs interview   Discharge Plan       Row Name 08/10/23 1411       Plan    Plan Needs interview    Plan Comments Barriers: Currently:CYSTOSCOPY URETEROSCOPY RETROGRADE PYELOGRAM HOLMIUM LASER STENT INSERTION                        Phone communication or documentation only - no physical contact with patient or family.   Connie PINEDA,RN Case Manager  [facility]  Phone: Desk- [phone] cell- [phone]

## 2023-08-10 NOTE — PLAN OF CARE
Goal Outcome Evaluation:  Plan of Care Reviewed With: patient        Progress: no change  Outcome Evaluation: new adnit from the ED with a diagnosis of uretral calculus and hydronephrosis in stable condition. WILI pt has a urology consult in [place]  will await further orders from MD

## 2023-08-10 NOTE — H&P
[facility] Observation Unit H&P    Patient Name: Ty Bergeron  : 1960  MRN: [id number]  Primary Care Physician: Richard Darnell APRN  Date of admission: 2023     Patient Care Team:  Richard Darnell APRN as PCP - General (Family Medicine)  ChemchiSukumar lugo MD as Consulting Physician (Cardiology)          Subjective   History Present Illness     Chief Complaint:   Chief Complaint   Patient presents with    Abdominal Pain     back    Back Injury     Flank pain    Abdominal Pain  Associated symptoms include dysuria and nausea.   Back Injury  Associated symptoms include abdominal pain, coughing and nausea.     63-year-old male complaining of abrupt onset of right flank pain this afternoon. He states it was associated with diaphoresis and nausea. He states the pain was variable but he does not think the pain ever completely went away. He reports no fever or chills. He reports that he knows he has a gallstone but states that this feels different. Had he denies meatal erythema or discharge reports no scrotal or testicular discomfort     Observation 8/10/23  Pt still uncomfortable. Urology has seen pt and is planning for surgical extraction of kidney stone today. Pt is on anticoagulation and is not a candidate for lithotripsy.     Review of Systems   Respiratory:  Positive for cough.    Musculoskeletal:         Lower extremity swelling   Gastrointestinal:  Positive for abdominal pain and nausea.   Genitourinary:  Positive for dysuria, flank pain and hesitancy.           Personal History     Past Medical History:   Past Medical History:   Diagnosis Date    Anxiety     Cholelithiasis     Depression     History of overdose years ago    DVT, bilateral lower limbs     GERD (gastroesophageal reflux disease)     Hyperlipidemia     Hypertension     Obesity     CASS (obstructive sleep apnea)     not treating    Pituitary microadenoma     Testosterone deficiency     Thrombocytopenia        Surgical History:       Past Surgical History:   Procedure Laterality Date    CARDIAC CATHETERIZATION N/A 02/03/2022    Procedure: Left Heart Cath;  Surgeon: Tevin Meza MD;  Location: [facility] CATH INVASIVE LOCATION;  Service: Cardiology;  Laterality: N/A;    CARDIAC CATHETERIZATION N/A 02/03/2022    Procedure: Coronary angiography;  Surgeon: Tevin Meza MD;  Location:  [facility] CATH INVASIVE LOCATION;  Service: Cardiology;  Laterality: N/A;    CARDIAC CATHETERIZATION N/A 02/03/2022    Procedure: Left ventriculography;  Surgeon: Tevin Meza MD;  Location: [facility] CATH INVASIVE LOCATION;  Service: Cardiology;  Laterality: N/A;    CARDIAC ELECTROPHYSIOLOGY PROCEDURE N/A 06/30/2023    Procedure: Ablation atrial fibrillation, RF, rep sent email;  Surgeon: Sukumar Brady MD;  Location: [facility] CATH INVASIVE LOCATION;  Service: Cardiovascular;  Laterality: N/A;    COLONOSCOPY      2011- due 5 years     HERNIA REPAIR      INGUINAL HERNIA REPAIR      JOINT REPLACEMENT      REPLACEMENT TOTAL KNEE Left 07/12/2023    SPLENECTOMY      TOTAL KNEE ARTHROPLASTY Left     TOTAL SHOULDER REPLACEMENT  02/12/2021    VASECTOMY             Family History: family history includes Diabetes in his brother and father; Heart disease in his brother and mother; Liver disease in his father. Otherwise pertinent FHx was reviewed and unremarkable.     Social History:  reports that he has never smoked. He has never been exposed to tobacco smoke. He has never used smokeless tobacco. He reports current alcohol use of about 20.0 standard drinks per week. He reports that he does not use drugs.      Medications:  Prior to Admission medications    Medication Sig Start Date End Date Taking? Authorizing Provider   aspirin 81 MG EC tablet Take 1 tablet by mouth Daily. 1/30/23  Yes Tevin Meza MD   cetirizine (zyrTEC) 10 MG tablet Take 1 tablet by mouth Daily.   Yes ProviderGracie MD   citalopram (CeleXA) 20 MG tablet Take  1 tablet by mouth Daily. 7/3/23  Yes Richard Darnell APRN   HYDROcodone-acetaminophen (NORCO) 5-325 MG per tablet 1 tablet Every 4 (Four) Hours As Needed for Severe Pain. 7/19/23  Yes ProviderGracie MD   lisinopril (PRINIVIL,ZESTRIL) 40 MG tablet Take 1 tablet by mouth Daily.   Yes ProviderGracie MD   meloxicam (MOBIC) 15 MG tablet Take 1 tablet by mouth Daily As Needed for Mild Pain. 8/9/23  Yes Richard Darnell APRN   metoprolol succinate XL (TOPROL-XL) 25 MG 24 hr tablet Take 1 tablet by mouth Daily. 8/9/23  Yes Sukumar Brady MD   rivaroxaban (XARELTO) 20 MG tablet Take 1 tablet by mouth Daily.   Yes ProviderGracie MD   rosuvastatin (CRESTOR) 10 MG tablet Take 1 tablet by mouth Daily. 7/3/23  Yes Richard Darnell APRN   traZODone (DESYREL) 150 MG tablet TAKE ONE TABLET BY MOUTH EVERY NIGHT AT BEDTIME 7/24/23  Yes Richard Darnell APRN   sildenafil (VIAGRA) 100 MG tablet Take 1 tablet by mouth Daily As Needed for Erectile Dysfunction.    Provider, MD Gracie   metoprolol tartrate (LOPRESSOR) 25 MG tablet Take 1 tablet by mouth 2 (Two) Times a Day. 4/7/23 4/27/23  Fabi Leyva APRN       Allergies:    Allergies   Allergen Reactions    Lipitor [Atorvastatin Calcium] Myalgia       Objective   Objective     Vital Signs  Temp:  [97.7 øF (36.5 øC)-98.3 øF (36.8 øC)] 97.7 øF (36.5 øC)  Heart Rate:  [57-87] 59  Resp:  [15-18] 18  BP: (109-142)/(60-86) 117/63  SpO2:  [94 %-98 %] 96 %  on   ;   Device (Oxygen Therapy): room air  Body mass index is 42.29 kg/mý.    Physical Exam  Constitutional:       Appearance: Normal appearance.   HENT:      Mouth/Throat:      Mouth: Mucous membranes are moist.   Cardiovascular:      Rate and Rhythm: Normal rate and regular rhythm.      Pulses: Normal pulses.      Heart sounds: Normal heart sounds.   Pulmonary:      Effort: Pulmonary effort is normal.      Comments: Bs clear but diminished  Abdominal:      Tenderness: There is  abdominal tenderness.   Musculoskeletal:         General: Swelling present.      Comments: Small amount of lower leg swelling   Skin:     General: Skin is warm and dry.   Neurological:      General: No focal deficit present.      Mental Status: He is alert and oriented to person, place, and time.   Psychiatric:         Mood and Affect: Mood normal.         Behavior: Behavior normal.         Results Review:  I have personally reviewed most recent lab results and radiology images and interpretations and agree with findings, most notably: cbc,cmp ua and ct abd.    Results from last 7 days   Lab Units 08/10/23  0404   WBC 10*3/mm3 8.00   HEMOGLOBIN g/dL 11.0*   HEMATOCRIT % 33.7*   PLATELETS 10*3/mm3 163     Results from last 7 days   Lab Units 08/10/23  0404 08/09/23  2033   SODIUM mmol/L 136 135*   POTASSIUM mmol/L 4.2 4.1   CHLORIDE mmol/L 103 100   CO2 mmol/L 23.0 21.0*   BUN mg/dL 24* 24*   CREATININE mg/dL 1.66* 1.49*   GLUCOSE mg/dL 97 113*   CALCIUM mg/dL 8.5* 9.2   ALT (SGPT) U/L  --  18   AST (SGOT) U/L  --  20     Estimated Creatinine Clearance: 63.6 mL/min (A) (by C-G formula based on SCr of 1.66 mg/dL (H)).  Brief Urine Lab Results  (Last result in the past 365 days)        Color   Clarity   Blood   Leuk Est   Nitrite   Protein   CREAT   Urine HCG        08/09/23 2050 Dark Yellow  Comment: Result checked     Cloudy   Large (3+)   Trace   Negative   30 mg/dL (1+)                   Microbiology Results (last 10 days)       ** No results found for the last 240 hours. **            ECG/EMG Results (most recent)       None            Results for orders placed during the hospital encounter of 09/27/19    Vascular screening (bundle) CAR    Interpretation Summary  ú Normal study      Results for orders placed during the hospital encounter of 02/14/22    Adult Transthoracic Echo Complete W/ Cont if Necessary Per Protocol    Interpretation Summary  ú Left ventricular systolic function is normal.  ú Left ventricular  ejection fraction is 60 to 65%  ú Left ventricular diastolic function was normal.      CT Abdomen Pelvis Without Contrast    Result Date: 8/9/2023  Impression: 1.Findings consistent with a 0.6 cm calcified stone within the proximal right ureter causing mild to moderate right-sided hydronephrosis with significant inflammatory changes. 2.Additional punctate nonobstructing stone in the left kidney. No obstruction or hydronephrosis on the left. 3.0.8 cm stone within the gallbladder neck without CT evidence of acute cholecystitis or biliary ductal dilation Electronically Signed: Raghav Almanza DO  8/9/2023 9:56 PM EDT  Workstation ID: [id number]       Estimated Creatinine Clearance: 63.6 mL/min (A) (by C-G formula based on SCr of 1.66 mg/dL (H)).    Assessment & Plan   Assessment/Plan       Active Hospital Problems    Diagnosis  POA    **Ureterolithiasis [N20.1]  Yes      Resolved Hospital Problems   No resolved problems to display.     Ureterolithiasis  - ct abdomen reviewed and showing .6 cm stone in right  proximal ureter causing mild to moderate hydronephrosis with inflammatory changes. Non obstructing stone in left kidney. .8 cm stone within the gallbladder neck  without cholecystitis or biliary duct dilation.  - cbc hgb 11.0  - cmp cr 1.66 bun 24  - UA trace LE, large 3+ blood, wbc 6-12 rbc 3-5, nitrite negative  - urology consulted and recommends ureteroscopic stone extraction which will be scheduled this afternoon.    Hx of DVT  - xarelto    Hypertension  -well Controlled   BP Readings from Last 1 Encounters:   08/10/23 117/63     - Continue lisinopril, toprol  - Monitor while admitted     HPL  - cont home statin therapy    VTE Prophylaxis -   Mechanical Order History:        Ordered        08/09/23 2246  Place Sequential Compression Device  Once            08/09/23 2246  Maintain Sequential Compression Device  Continuous                          Pharmalogical Order History:        Ordered     Dose Route  Frequency Stop    08/10/23 0837  rivaroxaban (XARELTO) tablet 20 mg         20 mg PO Daily --                    CODE STATUS:    Code Status and Medical Interventions:   Ordered at: 08/09/23 2238     Code Status (Patient has no pulse and is not breathing):    CPR (Attempt to Resuscitate)     Medical Interventions (Patient has pulse or is breathing):    Full Support       This patient has been examined wearing personal protective equipment.     I discussed the patient's findings and my recommendations with patient and nursing staff.      Signature:Electronically signed by Jen Diallo PA-C, 08/10/23, 8:42 AM EDT.

## 2023-08-10 NOTE — ED NOTES
Pt. States that he is feeling better after pain medication. Alert/awake/ family at bedside. IVFs continue to infuse. To remain NPO at this time.

## 2023-08-10 NOTE — OP NOTE
CYSTOSCOPY URETEROSCOPY RETROGRADE PYELOGRAM HOLMIUM LASER STENT INSERTION  Procedure Report    Patient Name:  yT Bergeron  YOB: 1960    Date of Surgery:  8/10/2023     Indications: 63-year-old gentleman with a 6 mm proximal right ureteral calculus.  He now presents for treatment of this.    Pre-op Diagnosis:   Obstructing proximal right ureteral calculus       Post-Op Diagnosis Codes:  Obstructing proximal right ureteral calculus    Procedure/CPTr Codes:      Procedure(s):  CYSTOSCOPY, right URETEROSCOPIC stone extraction, right ureteroscopic HOLMIUM LASER LITHOTRIPSY, RIGHT URETERAL STENT INSERTION    Staff:  Surgeon(s):  Ty Tucker MD            was responsible for performing the following activities:  None  and their skilled assistance was necessary for the success of this case.    Anesthesia: General    Estimated Blood Loss: minimal    Implants:    Implant Name Type Inv. Item Serial No.  Lot No. LRB No. Used Action   STNT PERCUFLX NO [id number] - [id number] Stent STNT PERCUFLX NO GW [id number]  [employer] [id number] Right 1 Implanted       Specimen:          ID Type Source Tests Collected by Time   1 : right ureteral calculus Calculus Ureter, Right STONE ANALYSIS Ty Tucker MD 8/10/2023 1402         Findings: Large proximal right ureteral calculus    Complications: None    Description of Procedure: Patient induced with general anesthesia and placed in dorsolithotomy position.  Prepped and draped in sterile fashion.  22 Polish cystoscope introduced under direct vision.  Urethra shows a mild bulbar stricture but I was able to pass through the scope passed the scope through this out without difficulty.  Prostate shows some hypertrophy that is visually obstructing.  Bladder is normal throughout but without any tumor, stone, foreign body.  Guidewires passed through the cystoscope and up the right ureter under fluoroscopy.  Dual-lumen catheter used to dilate the  distal ureter.  A second wire was placed.  Flexible ureteroscope was passed over the working wire and up to the level of the stone in the proximal right ureter.  Stone was too large to be removed intact.  Therefore the holmium laser was used to fragment the stone and multiple smaller pieces.  All these pieces were removed using a nitinol basket.  At the end of the procedure there is no further stones seen in the ureter.  I attempted to scope the entire kidney but there is quite a bit of blood clot and I could not visualize all the calyces.  The ureteroscope was removed the indwelling wire was backloaded through the cystoscope.  A 7 Sinhala by 26 cm ureteral stent was passed over the wire and up into the right kidney under fluoroscopy.  The wire was removed.  A good curl was seen in the right kidney under fluoroscopy good curl seen the bladder endoscopically.  Patient's bladder was emptied and the cystoscope was removed.  The patient was taken out of the dorsolithotomy position and awakened from general anesthesia.  He was transported to the postanesthesia care unit in stable condition having tolerated procedure well without any complications.    Patient can be discharged home either later today or tomorrow from urology standpoint.  Patient will follow-up in approximately 1 week for cystoscopy with stent removal as part of a planned staged procedure.          Ty Tucker MD     Date: 8/10/2023  Time: 14:10 EDT

## 2023-08-10 NOTE — CONSULTS
Urology Consult Note    Patient:Ty Bergeron :1960  Room:[facility]  Admit Date2023  Age:63 y.o.     SEX:male     DOS:8/10/2023     MR:[id number]     Visit:[id number]       Attending: Yoel Levine MD  Referring Provider: Dr. Levine  Reason for Consultation: Right ureteral calculus    Patient Care Team:  Richard Darnell APRN as PCP - General (Family Medicine)  Sukumar Brady MD as Consulting Physician (Cardiology)    Chief complaint right flank pain    Subjective .     History of present illness: Patient reports acute onset of severe right flank pain yesterday.  Patient has never had a kidney stone previously.  He did notice some associated gross hematuria.  He denies any fevers or chills.  CT scan was performed which revealed a 6 mm proximal right ureteral calculus with obstruction.  In addition there is a 3 mm left upper pole nonobstructing renal calculus.  White blood cell count is normal at 8.0.  Serum creatinine is mildly elevated 1.66.  Patient has improved after IV narcotics.    Review of Systems  10 point review of systems were reviewed and are negative except for:  Constitution:  positive for See HPI    History  Past Medical History:   Diagnosis Date    Anxiety     Cholelithiasis     Depression     History of overdose years ago    DVT, bilateral lower limbs     GERD (gastroesophageal reflux disease)     Hyperlipidemia     Hypertension     Obesity     CASS (obstructive sleep apnea)     not treating    Pituitary microadenoma     Testosterone deficiency     Thrombocytopenia      Past Surgical History:   Procedure Laterality Date    CARDIAC CATHETERIZATION N/A 2022    Procedure: Left Heart Cath;  Surgeon: Tevin Meza MD;  Location: [facility] INVASIVE LOCATION;  Service: Cardiology;  Laterality: N/A;    CARDIAC CATHETERIZATION N/A 2022    Procedure: Coronary angiography;  Surgeon: Tevin Meza MD;  Location: [facility] CATH INVASIVE LOCATION;   Service: Cardiology;  Laterality: N/A;    CARDIAC CATHETERIZATION N/A 02/03/2022    Procedure: Left ventriculography;  Surgeon: Tevin Meza MD;  Location: [facility] CATH INVASIVE LOCATION;  Service: Cardiology;  Laterality: N/A;    CARDIAC ELECTROPHYSIOLOGY PROCEDURE N/A 06/30/2023    Procedure: Ablation atrial fibrillation, RF, rep sent email;  Surgeon: Sukumar Brady MD;  Location:  [facility] CATH INVASIVE LOCATION;  Service: Cardiovascular;  Laterality: N/A;    COLONOSCOPY      2011- due 5 years     HERNIA REPAIR      INGUINAL HERNIA REPAIR      JOINT REPLACEMENT      REPLACEMENT TOTAL KNEE Left 07/12/2023    SPLENECTOMY      TOTAL KNEE ARTHROPLASTY Left     TOTAL SHOULDER REPLACEMENT  02/12/2021    VASECTOMY       Social History     Socioeconomic History    Marital status:    Tobacco Use    Smoking status: Never     Passive exposure: Never    Smokeless tobacco: Never   Vaping Use    Vaping Use: Never used   Substance and Sexual Activity    Alcohol use: Yes     Alcohol/week: 20.0 standard drinks     Types: 20 Cans of beer per week    Drug use: No    Sexual activity: Not Currently     Partners: Female     Birth control/protection: Condom     Family History   Problem Relation Age of Onset    Diabetes Father     Liver disease Father         transplant    Heart disease Mother     Diabetes Brother     Heart disease Brother      Allergy  Allergies   Allergen Reactions    Lipitor [Atorvastatin Calcium] Myalgia     Prior to Admission medications    Medication Sig Start Date End Date Taking? Authorizing Provider   aspirin 81 MG EC tablet Take 1 tablet by mouth Daily. 1/30/23  Yes Tevin Meza MD   cetirizine (zyrTEC) 10 MG tablet Take 1 tablet by mouth Daily.   Yes Provider, MD Gracie   citalopram (CeleXA) 20 MG tablet Take 1 tablet by mouth Daily. 7/3/23  Yes Richard Darnell APRN   HYDROcodone-acetaminophen (NORCO) 5-325 MG per tablet 1 tablet Every 4 (Four) Hours As Needed for  Severe Pain. 23  Yes Gracie Au MD   lisinopril (PRINIVIL,ZESTRIL) 40 MG tablet Take 1 tablet by mouth Daily.   Yes ProviderGracie MD   meloxicam (MOBIC) 15 MG tablet Take 1 tablet by mouth Daily As Needed for Mild Pain. 23  Yes Richard Darnell APRN   metoprolol succinate XL (TOPROL-XL) 25 MG 24 hr tablet Take 1 tablet by mouth Daily. 23  Yes Sukumar Brady MD   rivaroxaban (XARELTO) 20 MG tablet Take 1 tablet by mouth Daily.   Yes Gracie Au MD   rosuvastatin (CRESTOR) 10 MG tablet Take 1 tablet by mouth Daily. 7/3/23  Yes Richard Darnell APRN   traZODone (DESYREL) 150 MG tablet TAKE ONE TABLET BY MOUTH EVERY NIGHT AT BEDTIME 23  Yes Richard Darnell APRN   sildenafil (VIAGRA) 100 MG tablet Take 1 tablet by mouth Daily As Needed for Erectile Dysfunction.    Provider, MD Gracie   metoprolol tartrate (LOPRESSOR) 25 MG tablet Take 1 tablet by mouth 2 (Two) Times a Day. 23  Fabi Leyva APRN         Objective     tMax 24 hours:  Temp (24hrs), Av øF (36.7 øC), Min:97.7 øF (36.5 øC), Max:98.3 øF (36.8 øC)    Vital Sign Ranges:  Temp:  [97.7 øF (36.5 øC)-98.3 øF (36.8 øC)] 97.7 øF (36.5 øC)  Heart Rate:  [57-87] 59  Resp:  [15-18] 18  BP: (109-142)/(60-86) 117/63  Intake and Output Last 3 Shifts:  No intake/output data recorded.      Physical Exam:   General Appearance alert, appears stated age, and cooperative  Head normocephalic, without obvious abnormality and atraumatic  Abdomen no guarding and no rebound tenderness  Skin no bleeding, bruising or rash  Neurologic Mental Status orientated to person, place, time and situation    Results Review:     Lab Results (last 24 hours)       Procedure Component Value Units Date/Time    Basic Metabolic Panel [[id number]]  (Abnormal) Collected: 08/10/23 0404    Specimen: Blood from Hand, Right Updated: 08/10/23 050     Glucose 97 mg/dL      BUN 24 mg/dL      Creatinine 1.66 mg/dL       Sodium 136 mmol/L      Potassium 4.2 mmol/L      Chloride 103 mmol/L      CO2 23.0 mmol/L      Calcium 8.5 mg/dL      BUN/Creatinine Ratio 14.5     Anion Gap 10.0 mmol/L      eGFR 46.3 mL/min/1.73     Narrative:      GFR Normal >60  Chronic Kidney Disease <60  Kidney Failure <15      CBC Auto Differential [[id number]]  (Abnormal) Collected: 08/10/23 0404    Specimen: Blood from Hand, Right Updated: 08/10/23 0442     WBC 8.00 10*3/mm3      RBC 3.71 10*6/mm3      Hemoglobin 11.0 g/dL      Hematocrit 33.7 %      MCV 91.0 fL      MCH 29.8 pg      MCHC 32.7 g/dL      RDW 16.5 %      RDW-SD 52.1 fl      MPV 10.1 fL      Platelets 163 10*3/mm3      Neutrophil % 67.7 %      Lymphocyte % 13.8 %      Monocyte % 14.7 %      Eosinophil % 2.4 %      Basophil % 1.4 %      Neutrophils, Absolute 5.40 10*3/mm3      Lymphocytes, Absolute 1.10 10*3/mm3      Monocytes, Absolute 1.20 10*3/mm3      Eosinophils, Absolute 0.20 10*3/mm3      Basophils, Absolute 0.10 10*3/mm3      nRBC 0.0 /100 WBC     Urinalysis, Microscopic Only - Urine, Clean Catch [[id number]]  (Abnormal) Collected: 08/09/23 2050    Specimen: Urine, Clean Catch Updated: 08/09/23 2131     RBC, UA 3-5 /HPF      WBC, UA 6-12 /HPF      Bacteria, UA None Seen /HPF      Squamous Epithelial Cells, UA None Seen /HPF      Hyaline Casts, UA None Seen /LPF      Calcium Oxalate Crystals, UA Small/1+ /HPF      Methodology Manual Light Microscopy    Urine Culture - Urine, Urine, Clean Catch [[id number]] Collected: 08/09/23 2050    Specimen: Urine, Clean Catch Updated: 08/09/23 2131    Urinalysis With Culture If Indicated - Urine, Clean Catch [[id number]]  (Abnormal) Collected: 08/09/23 2050    Specimen: Urine, Clean Catch Updated: 08/09/23 2112     Color, UA Dark Yellow     Comment: Result checked          Appearance, UA Cloudy     pH, UA 6.0     Specific Gravity, UA 1.029     Glucose, UA Negative     Ketones, UA 15 mg/dL (1+)     Bilirubin, UA Negative     Blood, UA Large (3+)      Protein, UA 30 mg/dL (1+)     Leuk Esterase, UA Trace     Nitrite, UA Negative     Urobilinogen, UA 1.0 E.U./dL    Narrative:      In absence of clinical symptoms, the presence of pyuria, bacteria, and/or nitrites on the urinalysis result does not correlate with infection.    Comprehensive Metabolic Panel [[id number]]  (Abnormal) Collected: 08/09/23 2033    Specimen: Blood Updated: 08/09/23 2100     Glucose 113 mg/dL      BUN 24 mg/dL      Creatinine 1.49 mg/dL      Sodium 135 mmol/L      Potassium 4.1 mmol/L      Chloride 100 mmol/L      CO2 21.0 mmol/L      Calcium 9.2 mg/dL      Total Protein 6.5 g/dL      Albumin 4.0 g/dL      ALT (SGPT) 18 U/L      AST (SGOT) 20 U/L      Alkaline Phosphatase 85 U/L      Total Bilirubin 0.4 mg/dL      Globulin 2.5 gm/dL      A/G Ratio 1.6 g/dL      BUN/Creatinine Ratio 16.1     Anion Gap 14.0 mmol/L      eGFR 52.7 mL/min/1.73     Narrative:      GFR Normal >60  Chronic Kidney Disease <60  Kidney Failure <15      CBC & Differential [[id number]]  (Abnormal) Collected: 08/09/23 2033    Specimen: Blood Updated: 08/09/23 2040    Narrative:      The following orders were created for panel order CBC & Differential.  Procedure                               Abnormality         Status                     ---------                               -----------         ------                     CBC Auto Differential[[id number]]        Abnormal            Final result                 Please view results for these tests on the individual orders.    CBC Auto Differential [[id number]]  (Abnormal) Collected: 08/09/23 2033    Specimen: Blood Updated: 08/09/23 2040     WBC 9.60 10*3/mm3      RBC 3.95 10*6/mm3      Hemoglobin 11.6 g/dL      Hematocrit 35.4 %      MCV 89.5 fL      MCH 29.3 pg      MCHC 32.7 g/dL      RDW 16.6 %      RDW-SD 54.7 fl      MPV 9.9 fL      Platelets 176 10*3/mm3      Neutrophil % 80.5 %      Lymphocyte % 7.4 %      Monocyte % 9.2 %      Eosinophil % 1.2 %      Basophil % 1.7 %       Neutrophils, Absolute 7.70 10*3/mm3      Lymphocytes, Absolute 0.70 10*3/mm3      Monocytes, Absolute 0.90 10*3/mm3      Eosinophils, Absolute 0.10 10*3/mm3      Basophils, Absolute 0.20 10*3/mm3      nRBC 0.1 /100 WBC            No results found for: URINECX     Imaging Results (Last 7 Days)       Procedure Component Value Units Date/Time    CT Abdomen Pelvis Without Contrast [[id number]] Collected: 08/09/23 2149     Updated: 08/09/23 2158    Narrative:      CT ABDOMEN PELVIS WO CONTRAST    Date of Exam: 8/9/2023 8:55 PM EDT    Indication: Right flank and right lower quadrant pain.    Comparison: None available.    Technique: Axial CT images were obtained of the abdomen and pelvis without the administration of contrast. Sagittal and coronal reconstructions were performed.  Automated exposure control and iterative reconstruction methods were used.      Findings:  Visualized Chest: Patchy opacities in the right lung base most likely represents atelectasis. Mild elevation of the right hemidiaphragm. Otherwise the visualized lung bases and lower mediastinal structures are unremarkable.    Liver: Liver is normal in size and CT density. No focal lesions.    Gallbladder: The gallbladder is not distended. No evidence of pericholecystic fluid or wall thickening. A single 0.8 cm calcified stone is noted within the gallbladder neck.    Bile Ducts: No billiary dcutal dilation.    Spleen: Status post splenectomy.    Pancreas: Pancreas is normal. There is no evidence of pancreatic mass or peripancreatic fluid.    Adrenals: Adrenal glands are unremarkable.    Kidneys: 0.6 cm obstructing stone within the proximal right ureter causing mild to moderate right-sided hydronephrosis. There is significant perinephric fat stranding and edema. Additional nonobstructing stone in the left upper pole. No obstruction or   hydronephrosis on the left.      Gastrointestinal: The bowel loops are non-dilated without wall thickening or mass. The  appendix appears within normal limits. No evidence of obstruction. No free air. No mesenteric fluid collections identified.    Bladder: The bladder is completely decompressed limiting its evaluation.    Pelvis:  No suspecious mass.    Peritoneum/Mesentery: No fluid collection, ascities, or free air.      Lymph Nodes: No lymphadenopathy.    Vasculature: Unremarkable    Abdominal Wall: Unremarkable    Bony Structures: No acute osseous abnormality. Multilevel degenerative changes throughout the lumbar spine. Chronic appearing grade 1 retrolisthesis of L2 on L3.      Impression:      Impression:  1.Findings consistent with a 0.6 cm calcified stone within the proximal right ureter causing mild to moderate right-sided hydronephrosis with significant inflammatory changes.  2.Additional punctate nonobstructing stone in the left kidney. No obstruction or hydronephrosis on the left.  3.0.8 cm stone within the gallbladder neck without CT evidence of acute cholecystitis or biliary ductal dilation            Electronically Signed: Raghav Almanza DO    8/9/2023 9:56 PM EDT    Workstation ID: [id number]            Inpatient Meds:   Scheduled Meds:senna-docusate sodium, 2 tablet, Oral, BID  sodium chloride, 10 mL, Intravenous, Q12H       Continuous Infusions:sodium chloride, 100 mL/hr, Last Rate: 100 mL/hr (08/10/23 0635)       PRN Meds:.  acetaminophen    senna-docusate sodium **AND** polyethylene glycol **AND** bisacodyl **AND** bisacodyl    HYDROmorphone    melatonin    ondansetron    sodium chloride    sodium chloride      Assessment & Plan     Principal Problem:    Ureterolithiasis    Obstructing right ureteral calculus  Nonobstructing small left renal calculus    Plan  I discussed options with the patient.  The stone is too large to likely pass on its own.  Patient is on anticoagulation so shockwave lithotripsy is contraindicated.  He therefore is opted proceed with ureteroscopic stone extraction.  Will try to arrange for  that for this afternoon.  Risk, benefits, alternatives have been discussed with the patient he wishes to proceed.      I discussed the patient's findings and my recommendations with patient    Thank you for this  consult    Ty Tucker MD  08/10/23  07:29 EDT

## 2023-08-10 NOTE — ED NOTES
Dr. Levine at bedside to explain diagnostics and plan of care to patient and family. Pt. Agreeable to overnight admission.

## 2023-08-10 NOTE — ED TRIAGE NOTES
"Pt arrived via PV c/o lower abdominal and lower back pain that started at approx 1400. Pt states he is also nauseous but feels like he can not vomit. Pt also reports difficulty urinating, pt states \"not a lot comes out when I pee\" pt also reports urine being a pinkish color.   "

## 2023-08-10 NOTE — ANESTHESIA PROCEDURE NOTES
Airway  Urgency: elective    Date/Time: 8/10/2023 1:37 PM  Airway not difficult    General Information and Staff    Patient location during procedure: OR    Indications and Patient Condition  Indications for airway management: airway protection    Preoxygenated: yes  MILS maintained throughout  Mask difficulty assessment: 0 - not attempted    Final Airway Details  Final airway type: supraglottic airway      Successful airway: I-gel  Size 4     Number of attempts at approach: 1  Assessment: lips, teeth, and gum same as pre-op and atraumatic intubation

## 2023-08-10 NOTE — ED PROVIDER NOTES
Subjective   History of Present Illness  63-year-old male complaining of abrupt onset of right flank pain this afternoon.  He states it was associated with diaphoresis and nausea.  He states the pain was variable but he does not think the pain ever completely went away.  He reports no fever or chills.  He reports that he knows he has a gallstone but states that this feels different.  Had he denies meatal erythema or discharge reports no scrotal or testicular discomfort    Review of Systems   Constitutional:  Negative for chills and fever.   Gastrointestinal:  Positive for abdominal pain and nausea. Negative for abdominal distention.   Genitourinary:  Positive for flank pain.   All other systems reviewed and are negative.    Past Medical History:   Diagnosis Date    Anxiety     Cholelithiasis     Depression     History of overdose years ago    DVT, bilateral lower limbs     GERD (gastroesophageal reflux disease)     Hyperlipidemia     Hypertension     Obesity     CASS (obstructive sleep apnea)     not treating    Pituitary microadenoma     Testosterone deficiency     Thrombocytopenia        Allergies   Allergen Reactions    Lipitor [Atorvastatin Calcium] Myalgia       Past Surgical History:   Procedure Laterality Date    CARDIAC CATHETERIZATION N/A 02/03/2022    Procedure: Left Heart Cath;  Surgeon: Tevin Meza MD;  Location: [facility] CATH INVASIVE LOCATION;  Service: Cardiology;  Laterality: N/A;    CARDIAC CATHETERIZATION N/A 02/03/2022    Procedure: Coronary angiography;  Surgeon: Tevin Meza MD;  Location: [facility] CATH INVASIVE LOCATION;  Service: Cardiology;  Laterality: N/A;    CARDIAC CATHETERIZATION N/A 02/03/2022    Procedure: Left ventriculography;  Surgeon: Tevin Meza MD;  Location: [facility] CATH INVASIVE LOCATION;  Service: Cardiology;  Laterality: N/A;    CARDIAC ELECTROPHYSIOLOGY PROCEDURE N/A 06/30/2023    Procedure: Ablation atrial fibrillation, RF, rep sent email;   Surgeon: Sukumar Brady MD;  Location: [facility] INVASIVE LOCATION;  Service: Cardiovascular;  Laterality: N/A;    COLONOSCOPY      2011- due 5 years     HERNIA REPAIR      INGUINAL HERNIA REPAIR      JOINT REPLACEMENT      REPLACEMENT TOTAL KNEE Left 07/12/2023    SPLENECTOMY      TOTAL KNEE ARTHROPLASTY Left     TOTAL SHOULDER REPLACEMENT  02/12/2021    VASECTOMY         Family History   Problem Relation Age of Onset    Diabetes Father     Liver disease Father         transplant    Heart disease Mother     Diabetes Brother     Heart disease Brother        Social History     Socioeconomic History    Marital status:    Tobacco Use    Smoking status: Never     Passive exposure: Never    Smokeless tobacco: Never   Vaping Use    Vaping Use: Never used   Substance and Sexual Activity    Alcohol use: Yes     Alcohol/week: 20.0 standard drinks     Types: 20 Cans of beer per week    Drug use: No    Sexual activity: Not Currently     Partners: Female     Birth control/protection: Condom           Objective   Physical Exam  Alert [place] Coma Scale 15   HEENT: Pupils equal and reactive to light. Conjunctivae are not injected. Normal tympanic membranes. Oropharynx and nares are normal.   Neck: Supple. Midline trachea. No JVD. No goiter.   Chest: Clear and equal breath sounds bilaterally, regular rate and rhythm without murmur or rub.   Abdomen: Positive bowel sounds, nontender, nondistended. No rebound or peritoneal signs. No CVA tenderness.  Localizes area of discomfort to the right flank  Extremities no clubbing. cyanosis or edema. Motor sensory exam is normal. The full range of motion is intact   Skin: Warm and dry, no rashes or petechia.   Lymphatic: No regional lymphadenopathy. No calf pain, swelling or Homans sign    Procedures           ED Course                Labs Reviewed   COMPREHENSIVE METABOLIC PANEL - Abnormal; Notable for the following components:       Result Value    Glucose 113 (*)     BUN 24  (*)     Creatinine 1.49 (*)     Sodium 135 (*)     CO2 21.0 (*)     eGFR 52.7 (*)     All other components within normal limits    Narrative:     GFR Normal >60  Chronic Kidney Disease <60  Kidney Failure <15     URINALYSIS W/ CULTURE IF INDICATED - Abnormal; Notable for the following components:    Color, UA Dark Yellow (*)     Appearance, UA Cloudy (*)     Ketones, UA 15 mg/dL (1+) (*)     Blood, UA Large (3+) (*)     Protein, UA 30 mg/dL (1+) (*)     Leuk Esterase, UA Trace (*)     All other components within normal limits    Narrative:     In absence of clinical symptoms, the presence of pyuria, bacteria, and/or nitrites on the urinalysis result does not correlate with infection.   CBC WITH AUTO DIFFERENTIAL - Abnormal; Notable for the following components:    RBC 3.95 (*)     Hemoglobin 11.6 (*)     Hematocrit 35.4 (*)     RDW 16.6 (*)     RDW-SD 54.7 (*)     Neutrophil % 80.5 (*)     Lymphocyte % 7.4 (*)     Basophil % 1.7 (*)     Neutrophils, Absolute 7.70 (*)     All other components within normal limits   URINALYSIS, MICROSCOPIC ONLY - Abnormal; Notable for the following components:    RBC, UA 3-5 (*)     WBC, UA 6-12 (*)     All other components within normal limits   URINE CULTURE   CBC AND DIFFERENTIAL    Narrative:     The following orders were created for panel order CBC & Differential.  Procedure                               Abnormality         Status                     ---------                               -----------         ------                     CBC Auto Differential[[id number]]        Abnormal            Final result                 Please view results for these tests on the individual orders.     Medications   cefTRIAXone (ROCEPHIN) 2,000 mg in sodium chloride 0.9 % 100 mL IVPB (has no administration in time range)   sodium chloride 0.9 % bolus 500 mL (has no administration in time range)   lactated ringers bolus 1,000 mL (0 mL Intravenous Stopped 8/9/23 2212)   ondansetron (ZOFRAN)  injection 4 mg (4 mg Intravenous Given 8/9/23 2036)   HYDROmorphone (DILAUDID) injection 0.5 mg (0.5 mg Intravenous Given 8/9/23 2038)   ketorolac (TORADOL) injection 15 mg (15 mg Intravenous Given 8/9/23 2037)     CT Abdomen Pelvis Without Contrast    Result Date: 8/9/2023  Impression: 1.Findings consistent with a 0.6 cm calcified stone within the proximal right ureter causing mild to moderate right-sided hydronephrosis with significant inflammatory changes. 2.Additional punctate nonobstructing stone in the left kidney. No obstruction or hydronephrosis on the left. 3.0.8 cm stone within the gallbladder neck without CT evidence of acute cholecystitis or biliary ductal dilation Electronically Signed: Raghav Almanza DO  8/9/2023 9:56 PM EDT  Workstation ID: [id number]                                Medical Decision Making  Patient's creatinine has increased from 1.05-1.49 in 2 weeks.  The patient also has early signs of infection and patient given IV ceftriaxone with culture of attending    Problems Addressed:  Calculus of gallbladder without cholecystitis without obstruction: complicated acute illness or injury  Hydronephrosis with urinary obstruction due to ureteral calculus: complicated acute illness or injury  Renal colic on right side: complicated acute illness or injury  Ureteral calculus, right: complicated acute illness or injury    Amount and/or Complexity of Data Reviewed  Labs: ordered. Decision-making details documented in ED Course.  Radiology: ordered.  ECG/medicine tests: ordered.    Risk  Prescription drug management.  Decision regarding hospitalization.        Final diagnoses:   Ureteral calculus, right   Hydronephrosis with urinary obstruction due to ureteral calculus   Calculus of gallbladder without cholecystitis without obstruction   Renal colic on right side       ED Disposition  ED Disposition       ED Disposition   Decision to Admit    Condition   --    Comment   --               No follow-up  provider specified.       Medication List      No changes were made to your prescriptions during this visit.            Yoel Levine MD  08/09/23 4808

## 2023-08-11 ENCOUNTER — READMISSION MANAGEMENT (OUTPATIENT)
Dept: CALL CENTER | Facility: HOSPITAL | Age: 63
End: 2023-08-11
Payer: COMMERCIAL

## 2023-08-11 VITALS
HEIGHT: 70 IN | OXYGEN SATURATION: 96 % | SYSTOLIC BLOOD PRESSURE: 108 MMHG | HEART RATE: 71 BPM | DIASTOLIC BLOOD PRESSURE: 66 MMHG | BODY MASS INDEX: 42.2 KG/M2 | TEMPERATURE: 97.8 F | RESPIRATION RATE: 20 BRPM | WEIGHT: 294.75 LBS

## 2023-08-11 LAB
ANION GAP SERPL CALCULATED.3IONS-SCNC: 11 MMOL/L (ref 5–15)
BASOPHILS # BLD AUTO: 0.1 10*3/MM3 (ref 0–0.2)
BASOPHILS NFR BLD AUTO: 1.1 % (ref 0–1.5)
BUN SERPL-MCNC: 15 MG/DL (ref 8–23)
BUN/CREAT SERPL: 12.6 (ref 7–25)
CALCIUM SPEC-SCNC: 8.7 MG/DL (ref 8.6–10.5)
CHLORIDE SERPL-SCNC: 104 MMOL/L (ref 98–107)
CO2 SERPL-SCNC: 22 MMOL/L (ref 22–29)
CREAT SERPL-MCNC: 1.19 MG/DL (ref 0.76–1.27)
DEPRECATED RDW RBC AUTO: 55.1 FL (ref 37–54)
EGFRCR SERPLBLD CKD-EPI 2021: 69.1 ML/MIN/1.73
EOSINOPHIL # BLD AUTO: 0 10*3/MM3 (ref 0–0.4)
EOSINOPHIL NFR BLD AUTO: 0 % (ref 0.3–6.2)
ERYTHROCYTE [DISTWIDTH] IN BLOOD BY AUTOMATED COUNT: 16.6 % (ref 12.3–15.4)
GLUCOSE SERPL-MCNC: 107 MG/DL (ref 65–99)
HCT VFR BLD AUTO: 32.2 % (ref 37.5–51)
HGB BLD-MCNC: 10.4 G/DL (ref 13–17.7)
LYMPHOCYTES # BLD AUTO: 0.5 10*3/MM3 (ref 0.7–3.1)
LYMPHOCYTES NFR BLD AUTO: 8 % (ref 19.6–45.3)
MCH RBC QN AUTO: 29.4 PG (ref 26.6–33)
MCHC RBC AUTO-ENTMCNC: 32.3 G/DL (ref 31.5–35.7)
MCV RBC AUTO: 91 FL (ref 79–97)
MONOCYTES # BLD AUTO: 0.4 10*3/MM3 (ref 0.1–0.9)
MONOCYTES NFR BLD AUTO: 6.4 % (ref 5–12)
NEUTROPHILS NFR BLD AUTO: 5.8 10*3/MM3 (ref 1.7–7)
NEUTROPHILS NFR BLD AUTO: 84.5 % (ref 42.7–76)
NRBC BLD AUTO-RTO: 0.1 /100 WBC (ref 0–0.2)
PLATELET # BLD AUTO: 158 10*3/MM3 (ref 140–450)
PMV BLD AUTO: 10.5 FL (ref 6–12)
POTASSIUM SERPL-SCNC: 4.6 MMOL/L (ref 3.5–5.2)
RBC # BLD AUTO: 3.54 10*6/MM3 (ref 4.14–5.8)
SODIUM SERPL-SCNC: 137 MMOL/L (ref 136–145)
WBC NRBC COR # BLD: 6.8 10*3/MM3 (ref 3.4–10.8)

## 2023-08-11 PROCEDURE — 85025 COMPLETE CBC W/AUTO DIFF WBC: CPT | Performed by: EMERGENCY MEDICINE

## 2023-08-11 PROCEDURE — G0378 HOSPITAL OBSERVATION PER HR: HCPCS

## 2023-08-11 PROCEDURE — 80048 BASIC METABOLIC PNL TOTAL CA: CPT | Performed by: EMERGENCY MEDICINE

## 2023-08-11 PROCEDURE — 25010000002 CEFAZOLIN PER 500 MG: Performed by: UROLOGY

## 2023-08-11 RX ADMIN — CITALOPRAM HYDROBROMIDE 20 MG: 20 TABLET ORAL at 09:48

## 2023-08-11 RX ADMIN — METOPROLOL SUCCINATE 25 MG: 25 TABLET, FILM COATED, EXTENDED RELEASE ORAL at 09:48

## 2023-08-11 RX ADMIN — SENNOSIDES AND DOCUSATE SODIUM 2 TABLET: 50; 8.6 TABLET ORAL at 09:48

## 2023-08-11 RX ADMIN — CEFAZOLIN 2000 MG: 2 INJECTION, POWDER, FOR SOLUTION INTRAMUSCULAR; INTRAVENOUS at 05:20

## 2023-08-11 RX ADMIN — LISINOPRIL 40 MG: 20 TABLET ORAL at 09:48

## 2023-08-11 RX ADMIN — ASPIRIN 81 MG: 81 TABLET, COATED ORAL at 09:48

## 2023-08-11 RX ADMIN — Medication 10 ML: at 09:49

## 2023-08-11 RX ADMIN — SODIUM CHLORIDE 100 ML/HR: 4.5 INJECTION, SOLUTION INTRAVENOUS at 04:38

## 2023-08-11 NOTE — ANESTHESIA POSTPROCEDURE EVALUATION
Patient: Ty Bergeron    Procedure Summary       Date: 08/10/23 Room / Location: [facility] OR 01 / [facility] MAIN OR    Anesthesia Start: 1332 Anesthesia Stop: 1421    Procedure: CYSTOSCOPY, RIGHT URETEROSCOPY, RETROGRADE PYELOGRAM HOLMIUM LASER, STONE EXTRACTION AND  STENT INSERTION (NO STRING) (Right) Diagnosis:     Surgeons: Ty Tucker MD Provider: London Victoria MD    Anesthesia Type: general ASA Status: 3            Anesthesia Type: general    Vitals  Vitals Value Taken Time   /63 08/10/23 1526   Temp 97.4 øF (36.3 øC) 08/10/23 1526   Pulse 59 08/10/23 1528   Resp 12 08/10/23 1526   SpO2 92 % 08/10/23 1528   Vitals shown include unvalidated device data.        Post Anesthesia Care and Evaluation    Patient location during evaluation: PACU  Patient participation: complete - patient participated  Level of consciousness: awake  Pain scale: See nurse's notes for pain score.  Pain management: adequate    Airway patency: patent  Anesthetic complications: No anesthetic complications  PONV Status: none  Cardiovascular status: acceptable  Respiratory status: acceptable and spontaneous ventilation  Hydration status: acceptable    Comments: Patient seen and examined postoperatively; vital signs stable; SpO2 greater than or equal to 90%; cardiopulmonary status stable; nausea/vomiting adequately controlled; pain adequately controlled; no apparent anesthesia complications; patient discharged from anesthesia care when discharge criteria were met

## 2023-08-11 NOTE — DISCHARGE SUMMARY
[facility]    Richard Darnell MAHESH, APRN    CHIEF COMPLAINT:     Abdominal pain and flank pain    HISTORY OF PRESENT ILLNESS:    HPI    63-year-old male complaining of abrupt onset of right flank pain this afternoon. He states it was associated with diaphoresis and nausea. He states the pain was variable but he does not think the pain ever completely went away. He reports no fever or chills. He reports that he knows he has a gallstone but states that this feels different. Had he denies meatal erythema or discharge reports no scrotal or testicular discomfort      Observation 8/10/23  Pt still uncomfortable. Urology has seen pt and is planning for surgical extraction of kidney stone today. Pt is on anticoagulation and is not a candidate for lithotripsy.     Past Medical History:   Diagnosis Date    Anxiety     Cholelithiasis     Depression     History of overdose years ago    DVT, bilateral lower limbs     GERD (gastroesophageal reflux disease)     Hyperlipidemia     Hypertension     Obesity     CASS (obstructive sleep apnea)     not treating    Pituitary microadenoma     Testosterone deficiency     Thrombocytopenia      Past Surgical History:   Procedure Laterality Date    CARDIAC CATHETERIZATION N/A 02/03/2022    Procedure: Left Heart Cath;  Surgeon: Tevin Meza MD;  Location: [facility] CATH INVASIVE LOCATION;  Service: Cardiology;  Laterality: N/A;    CARDIAC CATHETERIZATION N/A 02/03/2022    Procedure: Coronary angiography;  Surgeon: Tevin Meza MD;  Location: [facility] CATH INVASIVE LOCATION;  Service: Cardiology;  Laterality: N/A;    CARDIAC CATHETERIZATION N/A 02/03/2022    Procedure: Left ventriculography;  Surgeon: Tevin Meza MD;  Location: [facility] CATH INVASIVE LOCATION;  Service: Cardiology;  Laterality: N/A;    CARDIAC ELECTROPHYSIOLOGY PROCEDURE N/A 06/30/2023    Procedure: Ablation atrial fibrillation, RF, rep sent email;  Surgeon: Caitlyn  MD Sukumar;  Location: [facility] INVASIVE LOCATION;  Service: Cardiovascular;  Laterality: N/A;    COLONOSCOPY      2011- due 5 years     HERNIA REPAIR      INGUINAL HERNIA REPAIR      JOINT REPLACEMENT      REPLACEMENT TOTAL KNEE Left 07/12/2023    SPLENECTOMY      TOTAL KNEE ARTHROPLASTY Left     TOTAL SHOULDER REPLACEMENT  02/12/2021    VASECTOMY       Family History   Problem Relation Age of Onset    Diabetes Father     Liver disease Father         transplant    Heart disease Mother     Diabetes Brother     Heart disease Brother      Social History     Tobacco Use    Smoking status: Never     Passive exposure: Never    Smokeless tobacco: Never   Vaping Use    Vaping Use: Never used   Substance Use Topics    Alcohol use: Yes     Alcohol/week: 20.0 standard drinks     Types: 20 Cans of beer per week    Drug use: No     Medications Prior to Admission   Medication Sig Dispense Refill Last Dose    aspirin 81 MG EC tablet Take 1 tablet by mouth Daily. 30 tablet 11 8/10/2023    cetirizine (zyrTEC) 10 MG tablet Take 1 tablet by mouth Daily.   8/10/2023    citalopram (CeleXA) 20 MG tablet Take 1 tablet by mouth Daily. 90 tablet 1 8/10/2023    HYDROcodone-acetaminophen (NORCO) 5-325 MG per tablet 1 tablet Every 4 (Four) Hours As Needed for Severe Pain.   8/10/2023    lisinopril (PRINIVIL,ZESTRIL) 40 MG tablet Take 1 tablet by mouth Daily.   8/10/2023    meloxicam (MOBIC) 15 MG tablet Take 1 tablet by mouth Daily As Needed for Mild Pain. 30 tablet 1 Past Month    metoprolol succinate XL (TOPROL-XL) 25 MG 24 hr tablet Take 1 tablet by mouth Daily. 30 tablet 11 8/10/2023    rivaroxaban (XARELTO) 20 MG tablet Take 1 tablet by mouth Daily.   8/10/2023    rosuvastatin (CRESTOR) 10 MG tablet Take 1 tablet by mouth Daily. 90 tablet 1 8/10/2023    traZODone (DESYREL) 150 MG tablet TAKE ONE TABLET BY MOUTH EVERY NIGHT AT BEDTIME 30 tablet 3 8/9/2023    sildenafil (VIAGRA) 100 MG tablet Take 1 tablet by mouth Daily As Needed for  Erectile Dysfunction.   More than a month     Allergies:  Lipitor [atorvastatin calcium]    Immunization History   Administered Date(s) Administered    COVID-19 (PFIZER) Purple Cap Monovalent 03/13/2021, 04/03/2021, 01/08/2022    DTaP 04/27/2016    Fluzone Quad >6mos (Multi-dose) 10/07/2020    Meningococcal B,(Bexsero) 10/31/2018, 01/17/2019    Meningococcal Conjugate 04/27/2016, 07/27/2016    Pneumococcal Conjugate 13-Valent (PCV13) 04/27/2016    Pneumococcal Polysaccharide (PPSV23) 07/27/2016    Shingrix 08/12/2020, 10/06/2020, 02/26/2021    Tdap 04/27/2016           REVIEW OF SYSTEMS:    ROS    Respiratory:  Positive for cough.    Musculoskeletal:         Lower extremity swelling   Gastrointestinal:  Positive for abdominal pain and nausea.   Genitourinary:  Positive for dysuria, flank pain and hesitancy.     Vital Signs  Temp:  [97.4 øF (36.3 øC)-97.9 øF (36.6 øC)] 97.8 øF (36.6 øC)  Heart Rate:  [55-71] 71  Resp:  [12-21] 20  BP: (101-123)/(49-76) 108/66          Physical Exam:  Physical Exam    Constitutional:       Appearance: Normal appearance.   HENT:      Mouth/Throat:      Mouth: Mucous membranes are moist.   Cardiovascular:      Rate and Rhythm: Normal rate and regular rhythm.      Pulses: Normal pulses.      Heart sounds: Normal heart sounds.   Pulmonary:      Effort: Pulmonary effort is normal.      Comments: Bs clear but diminished  Abdominal:      Tenderness: There is abdominal tenderness.   Musculoskeletal:         General: Swelling present.      Comments: Small amount of lower leg swelling   Skin:     General: Skin is warm and dry.   Neurological:      General: No focal deficit present.      Mental Status: He is alert and oriented to person, place, and time.   Psychiatric:         Mood and Affect: Mood normal.         Behavior: Behavior normal.     Emotional Behavior:    wnl   Debilities:   none  Results Review:    I reviewed the patient's new clinical results.  Lab Results (most recent)        Procedure Component Value Units Date/Time    Basic Metabolic Panel [[id number]]  (Abnormal) Collected: 08/11/23 0529    Specimen: Blood from Arm, Left Updated: 08/11/23 0621     Glucose 107 mg/dL      BUN 15 mg/dL      Creatinine 1.19 mg/dL      Sodium 137 mmol/L      Potassium 4.6 mmol/L      Chloride 104 mmol/L      CO2 22.0 mmol/L      Calcium 8.7 mg/dL      BUN/Creatinine Ratio 12.6     Anion Gap 11.0 mmol/L      eGFR 69.1 mL/min/1.73     Narrative:      GFR Normal >60  Chronic Kidney Disease <60  Kidney Failure <15      CBC & Differential [[id number]]  (Abnormal) Collected: 08/11/23 0529    Specimen: Blood from Arm, Left Updated: 08/11/23 0558    Narrative:      The following orders were created for panel order CBC & Differential.  Procedure                               Abnormality         Status                     ---------                               -----------         ------                     CBC Auto Differential[[id number]]        Abnormal            Final result                 Please view results for these tests on the individual orders.    CBC Auto Differential [[id number]]  (Abnormal) Collected: 08/11/23 0529    Specimen: Blood from Arm, Left Updated: 08/11/23 0558     WBC 6.80 10*3/mm3      RBC 3.54 10*6/mm3      Hemoglobin 10.4 g/dL      Hematocrit 32.2 %      MCV 91.0 fL      MCH 29.4 pg      MCHC 32.3 g/dL      RDW 16.6 %      RDW-SD 55.1 fl      MPV 10.5 fL      Platelets 158 10*3/mm3      Neutrophil % 84.5 %      Lymphocyte % 8.0 %      Monocyte % 6.4 %      Eosinophil % 0.0 %      Basophil % 1.1 %      Neutrophils, Absolute 5.80 10*3/mm3      Lymphocytes, Absolute 0.50 10*3/mm3      Monocytes, Absolute 0.40 10*3/mm3      Eosinophils, Absolute 0.00 10*3/mm3      Basophils, Absolute 0.10 10*3/mm3      nRBC 0.1 /100 WBC     Urine Culture - Urine, Urine, Clean Catch [[id number]]  (Normal) Collected: 08/09/23 2050    Specimen: Urine, Clean Catch Updated: 08/10/23 2352     Urine Culture No growth     STONE ANALYSIS - Calculus, Ureter, Right [[id number]] Collected: 08/10/23 1402    Specimen: Calculus from Ureter, Right Updated: 08/10/23 1428    Basic Metabolic Panel [[id number]]  (Abnormal) Collected: 08/10/23 0404    Specimen: Blood from Hand, Right Updated: 08/10/23 0509     Glucose 97 mg/dL      BUN 24 mg/dL      Creatinine 1.66 mg/dL      Sodium 136 mmol/L      Potassium 4.2 mmol/L      Chloride 103 mmol/L      CO2 23.0 mmol/L      Calcium 8.5 mg/dL      BUN/Creatinine Ratio 14.5     Anion Gap 10.0 mmol/L      eGFR 46.3 mL/min/1.73     Narrative:      GFR Normal >60  Chronic Kidney Disease <60  Kidney Failure <15      CBC Auto Differential [[id number]]  (Abnormal) Collected: 08/10/23 0404    Specimen: Blood from Hand, Right Updated: 08/10/23 0442     WBC 8.00 10*3/mm3      RBC 3.71 10*6/mm3      Hemoglobin 11.0 g/dL      Hematocrit 33.7 %      MCV 91.0 fL      MCH 29.8 pg      MCHC 32.7 g/dL      RDW 16.5 %      RDW-SD 52.1 fl      MPV 10.1 fL      Platelets 163 10*3/mm3      Neutrophil % 67.7 %      Lymphocyte % 13.8 %      Monocyte % 14.7 %      Eosinophil % 2.4 %      Basophil % 1.4 %      Neutrophils, Absolute 5.40 10*3/mm3      Lymphocytes, Absolute 1.10 10*3/mm3      Monocytes, Absolute 1.20 10*3/mm3      Eosinophils, Absolute 0.20 10*3/mm3      Basophils, Absolute 0.10 10*3/mm3      nRBC 0.0 /100 WBC     Urinalysis, Microscopic Only - Urine, Clean Catch [[id number]]  (Abnormal) Collected: 08/09/23 2050    Specimen: Urine, Clean Catch Updated: 08/09/23 2131     RBC, UA 3-5 /HPF      WBC, UA 6-12 /HPF      Bacteria, UA None Seen /HPF      Squamous Epithelial Cells, UA None Seen /HPF      Hyaline Casts, UA None Seen /LPF      Calcium Oxalate Crystals, UA Small/1+ /HPF      Methodology Manual Light Microscopy    Urinalysis With Culture If Indicated - Urine, Clean Catch [[id number]]  (Abnormal) Collected: 08/09/23 2050    Specimen: Urine, Clean Catch Updated: 08/09/23 2112     Color, UA Dark Yellow     Comment:  Result checked          Appearance, UA Cloudy     pH, UA 6.0     Specific Gravity, UA 1.029     Glucose, UA Negative     Ketones, UA 15 mg/dL (1+)     Bilirubin, UA Negative     Blood, UA Large (3+)     Protein, UA 30 mg/dL (1+)     Leuk Esterase, UA Trace     Nitrite, UA Negative     Urobilinogen, UA 1.0 E.U./dL    Narrative:      In absence of clinical symptoms, the presence of pyuria, bacteria, and/or nitrites on the urinalysis result does not correlate with infection.    Comprehensive Metabolic Panel [[id number]]  (Abnormal) Collected: 08/09/23 2033    Specimen: Blood Updated: 08/09/23 2100     Glucose 113 mg/dL      BUN 24 mg/dL      Creatinine 1.49 mg/dL      Sodium 135 mmol/L      Potassium 4.1 mmol/L      Chloride 100 mmol/L      CO2 21.0 mmol/L      Calcium 9.2 mg/dL      Total Protein 6.5 g/dL      Albumin 4.0 g/dL      ALT (SGPT) 18 U/L      AST (SGOT) 20 U/L      Alkaline Phosphatase 85 U/L      Total Bilirubin 0.4 mg/dL      Globulin 2.5 gm/dL      A/G Ratio 1.6 g/dL      BUN/Creatinine Ratio 16.1     Anion Gap 14.0 mmol/L      eGFR 52.7 mL/min/1.73     Narrative:      GFR Normal >60  Chronic Kidney Disease <60  Kidney Failure <15      CBC & Differential [[id number]]  (Abnormal) Collected: 08/09/23 2033    Specimen: Blood Updated: 08/09/23 2040    Narrative:      The following orders were created for panel order CBC & Differential.  Procedure                               Abnormality         Status                     ---------                               -----------         ------                     CBC Auto Differential[[id number]]        Abnormal            Final result                 Please view results for these tests on the individual orders.            Imaging Results (Most Recent)       Procedure Component Value Units Date/Time    CT Abdomen Pelvis Without Contrast [[id number]] Collected: 08/09/23 2149     Updated: 08/09/23 2158    Narrative:      CT ABDOMEN PELVIS WO CONTRAST    Date of Exam:  8/9/2023 8:55 PM EDT    Indication: Right flank and right lower quadrant pain.    Comparison: None available.    Technique: Axial CT images were obtained of the abdomen and pelvis without the administration of contrast. Sagittal and coronal reconstructions were performed.  Automated exposure control and iterative reconstruction methods were used.      Findings:  Visualized Chest: Patchy opacities in the right lung base most likely represents atelectasis. Mild elevation of the right hemidiaphragm. Otherwise the visualized lung bases and lower mediastinal structures are unremarkable.    Liver: Liver is normal in size and CT density. No focal lesions.    Gallbladder: The gallbladder is not distended. No evidence of pericholecystic fluid or wall thickening. A single 0.8 cm calcified stone is noted within the gallbladder neck.    Bile Ducts: No billiary dcutal dilation.    Spleen: Status post splenectomy.    Pancreas: Pancreas is normal. There is no evidence of pancreatic mass or peripancreatic fluid.    Adrenals: Adrenal glands are unremarkable.    Kidneys: 0.6 cm obstructing stone within the proximal right ureter causing mild to moderate right-sided hydronephrosis. There is significant perinephric fat stranding and edema. Additional nonobstructing stone in the left upper pole. No obstruction or   hydronephrosis on the left.      Gastrointestinal: The bowel loops are non-dilated without wall thickening or mass. The appendix appears within normal limits. No evidence of obstruction. No free air. No mesenteric fluid collections identified.    Bladder: The bladder is completely decompressed limiting its evaluation.    Pelvis:  No suspecious mass.    Peritoneum/Mesentery: No fluid collection, ascities, or free air.      Lymph Nodes: No lymphadenopathy.    Vasculature: Unremarkable    Abdominal Wall: Unremarkable    Bony Structures: No acute osseous abnormality. Multilevel degenerative changes throughout the lumbar spine.  Chronic appearing grade 1 retrolisthesis of L2 on L3.      Impression:      Impression:  1.Findings consistent with a 0.6 cm calcified stone within the proximal right ureter causing mild to moderate right-sided hydronephrosis with significant inflammatory changes.  2.Additional punctate nonobstructing stone in the left kidney. No obstruction or hydronephrosis on the left.  3.0.8 cm stone within the gallbladder neck without CT evidence of acute cholecystitis or biliary ductal dilation            Electronically Signed: Raghav Almanza DO    8/9/2023 9:56 PM EDT    Workstation ID: [id number]          reviewed    ECG/EMG Results (most recent)       Procedure Component Value Units Date/Time    SCANNED - TELEMETRY   [[id number]] Resulted: 08/09/23     Updated: 08/10/23 2345    SCANNED - TELEMETRY   [[id number]] Resulted: 08/09/23     Updated: 08/11/23 0526    SCANNED - TELEMETRY   [[id number]] Resulted: 08/09/23     Updated: 08/11/23 0533    SCANNED - TELEMETRY   [[id number]] Resulted: 08/09/23     Updated: 08/11/23 0850          reviewed    Results for orders placed during the hospital encounter of 09/27/19    Vascular screening (bundle) CAR    Interpretation Summary  ú Normal study      Results for orders placed during the hospital encounter of 02/14/22    Adult Transthoracic Echo Complete W/ Cont if Necessary Per Protocol    Interpretation Summary  ú Left ventricular systolic function is normal.  ú Left ventricular ejection fraction is 60 to 65%  ú Left ventricular diastolic function was normal.      Microbiology Results (last 10 days)       Procedure Component Value - Date/Time    Urine Culture - Urine, Urine, Clean Catch [[id number]]  (Normal) Collected: 08/09/23 2050    Lab Status: Final result Specimen: Urine, Clean Catch Updated: 08/10/23 2352     Urine Culture No growth            Assessment & Plan     * No active hospital problems. *        Ureterolithiasis  - ct abdomen reviewed and showing .6 cm stone in right   proximal ureter causing mild to moderate hydronephrosis with inflammatory changes. Non obstructing stone in left kidney. .8 cm stone within the gallbladder neck  without cholecystitis or biliary duct dilation.  - cbc hgb 11.0  - cmp cr 1.66 bun 24  - UA trace LE, large 3+ blood, wbc 6-12 rbc 3-5, nitrite negative  - urology consulted and recommends ureteroscopic stone extraction   - pt tolerated procedure well. Pt to follow up with urology in 1 week outpt     Hx of DVT  - xarelto     Hypertension  -well Controlled       BP Readings from Last 1 Encounters:   08/10/23 117/63      - Continue lisinopril, toprol  - Monitor while admitted      HPL  - cont home statin therapy    I discussed the patients findings and my recommendations with patient and nursing staff.     Discharge Diagnosis:      * No active hospital problems. *      Hospital Course  Patient is a 63 y.o. male presented with flank and abdominal pain x 1day. Pt also had nausea and diaphoresis. Pt evaluated by er and admitted to observation. CT showed a right sided stone that was causing hydronephrosis.  Pt is on a blood thinner and was not a good candidate for lithotripsy. Pt had ureteroscopic extraction performed without complication. Pt to follow up with urology in 1 week outpt. Discharge plans discussed with pt and he is agreeable to plan. Instructed pt to return to er if symptoms reoccur.    Past Medical History:     Past Medical History:   Diagnosis Date    Anxiety     Cholelithiasis     Depression     History of overdose years ago    DVT, bilateral lower limbs     GERD (gastroesophageal reflux disease)     Hyperlipidemia     Hypertension     Obesity     CASS (obstructive sleep apnea)     not treating    Pituitary microadenoma     Testosterone deficiency     Thrombocytopenia        Past Surgical History:     Past Surgical History:   Procedure Laterality Date    CARDIAC CATHETERIZATION N/A 02/03/2022    Procedure: Left Heart Cath;  Surgeon: Tevin Meza  Kody Lindsay MD;  Location: [facility] CATH INVASIVE LOCATION;  Service: Cardiology;  Laterality: N/A;    CARDIAC CATHETERIZATION N/A 02/03/2022    Procedure: Coronary angiography;  Surgeon: Tevin Meza MD;  Location: [facility] CATH INVASIVE LOCATION;  Service: Cardiology;  Laterality: N/A;    CARDIAC CATHETERIZATION N/A 02/03/2022    Procedure: Left ventriculography;  Surgeon: Tevin Meza MD;  Location: [facility] CATH INVASIVE LOCATION;  Service: Cardiology;  Laterality: N/A;    CARDIAC ELECTROPHYSIOLOGY PROCEDURE N/A 06/30/2023    Procedure: Ablation atrial fibrillation, RF, rep sent email;  Surgeon: Sukumar Brady MD;  Location: [facility] CATH INVASIVE LOCATION;  Service: Cardiovascular;  Laterality: N/A;    COLONOSCOPY      2011- due 5 years     HERNIA REPAIR      INGUINAL HERNIA REPAIR      JOINT REPLACEMENT      REPLACEMENT TOTAL KNEE Left 07/12/2023    SPLENECTOMY      TOTAL KNEE ARTHROPLASTY Left     TOTAL SHOULDER REPLACEMENT  02/12/2021    VASECTOMY         Social History:   Social History     Socioeconomic History    Marital status:    Tobacco Use    Smoking status: Never     Passive exposure: Never    Smokeless tobacco: Never   Vaping Use    Vaping Use: Never used   Substance and Sexual Activity    Alcohol use: Yes     Alcohol/week: 20.0 standard drinks     Types: 20 Cans of beer per week    Drug use: No    Sexual activity: Not Currently     Partners: Female     Birth control/protection: Condom       Procedures Performed    Procedure(s):  CYSTOSCOPY, RIGHT URETEROSCOPY, RETROGRADE PYELOGRAM HOLMIUM LASER, STONE EXTRACTION AND  STENT INSERTION (NO STRING)  -------------------       Consults:   Consults       Date and Time Order Name Status Description    8/10/2023 12:46 AM Inpatient Urology Consult Completed             Condition on Discharge:     Stable    Discharge Disposition      Discharge Medications     Discharge Medications        New Medications        Instructions Start Date    cefdinir 300 MG capsule  Commonly known as: OMNICEF   300 mg, Oral, 2 Times Daily             ASK your doctor about these medications        Instructions Start Date   aspirin 81 MG EC tablet   81 mg, Oral, Daily      cetirizine 10 MG tablet  Commonly known as: zyrTEC   10 mg, Oral, Daily      citalopram 20 MG tablet  Commonly known as: CeleXA   20 mg, Oral, Daily      HYDROcodone-acetaminophen 5-325 MG per tablet  Commonly known as: NORCO  Ask about: Which instructions should I use?   1 tablet, Every 4 Hours PRN      HYDROcodone-acetaminophen  MG per tablet  Commonly known as: NORCO  Ask about: Which instructions should I use?   1 tablet, Oral, Every 6 Hours PRN      lisinopril 40 MG tablet  Commonly known as: PRINIVIL,ZESTRIL   40 mg, Oral, Daily      meloxicam 15 MG tablet  Commonly known as: MOBIC   15 mg, Oral, Daily PRN      metoprolol succinate XL 25 MG 24 hr tablet  Commonly known as: TOPROL-XL   25 mg, Oral, Daily      rivaroxaban 20 MG tablet  Commonly known as: XARELTO   20 mg, Oral, Daily      rosuvastatin 10 MG tablet  Commonly known as: CRESTOR   10 mg, Oral, Daily      sildenafil 100 MG tablet  Commonly known as: VIAGRA   100 mg, Oral, Daily PRN      traZODone 150 MG tablet  Commonly known as: DESYREL   TAKE ONE TABLET BY MOUTH EVERY NIGHT AT BEDTIME               Discharge Diet:     Activity at Discharge:     Follow-up Appointments  Future Appointments   Date Time Provider Department Center   1/19/2024  9:00 AM Richard Darnell APRN [facility]   2/27/2024 10:15 AM Tevin Meza MD [facility]   8/6/2024  1:00 PM Sukumar Brady MD [facility]         Test Results Pending at Discharge  Pending Labs       Order Current Status    STONE ANALYSIS - Calculus, Ureter, Right In process             Risk for Readmission (LACE) Score: 2 (8/11/2023  6:00 AM)      Less Than 30 minutes spent in discharge activities for this patient    Jen Diallo,  AMALIA  08/11/23  09:34 EDT

## 2023-08-11 NOTE — PLAN OF CARE
Problem: Fall Injury Risk  Goal: Absence of Fall and Fall-Related Injury  Outcome: Ongoing, Progressing  Intervention: Promote Injury-Free Environment  Recent Flowsheet Documentation  Taken 8/11/2023 0000 by Gaye Ashby RN  Safety Promotion/Fall Prevention: safety round/check completed  Taken 8/10/2023 2200 by Gaye Ashby RN  Safety Promotion/Fall Prevention: safety round/check completed  Taken 8/10/2023 2000 by Gaye Ashby RN  Safety Promotion/Fall Prevention:   safety round/check completed   nonskid shoes/slippers when out of bed   Goal Outcome Evaluation:              Outcome Evaluation: PT RECEIVED LASER STENT INSERTION 8-10-23. OK TO DC FROM UROLOGY STANDPOINT WITH FOLLOW UP; PT WITHOUT ANY COMPLAINTS S/P; PT HAS VOIDED SEVERAL TIMES; URINE STILL BLOODY.

## 2023-08-11 NOTE — PLAN OF CARE
Problem: Fall Injury Risk  Goal: Absence of Fall and Fall-Related Injury  Intervention: Identify and Manage Contributors  Recent Flowsheet Documentation  Taken 8/11/2023 0810 by Raisa Chowdhury RN  Medication Review/Management: medications reviewed  Intervention: Promote Injury-Free Environment  Recent Flowsheet Documentation  Taken 8/11/2023 0810 by Raisa Chowdhury RN  Safety Promotion/Fall Prevention: safety round/check completed     Problem: Adult Inpatient Plan of Care  Goal: Absence of Hospital-Acquired Illness or Injury  Intervention: Identify and Manage Fall Risk  Recent Flowsheet Documentation  Taken 8/11/2023 0810 by Raisa Chowdhury RN  Safety Promotion/Fall Prevention: safety round/check completed  Intervention: Prevent Skin Injury  Recent Flowsheet Documentation  Taken 8/11/2023 0810 by Raisa Chowdhury RN  Body Position: position changed independently  Intervention: Prevent and Manage VTE (Venous Thromboembolism) Risk  Recent Flowsheet Documentation  Taken 8/11/2023 0810 by Raisa Chowdhury RN  Activity Management: activity encouraged  Goal: Optimal Comfort and Wellbeing  Intervention: Provide Person-Centered Care  Recent Flowsheet Documentation  Taken 8/11/2023 0810 by Raisa Chowdhury RN  Trust Relationship/Rapport:   care explained   choices provided   emotional support provided   empathic listening provided   questions answered   questions encouraged   reassurance provided   thoughts/feelings acknowledged     Problem: Pain Acute  Goal: Acceptable Pain Control and Functional Ability  Intervention: Prevent or Manage Pain  Recent Flowsheet Documentation  Taken 8/11/2023 0810 by Raisa Chowdhury RN  Medication Review/Management: medications reviewed  Intervention: Optimize Psychosocial Wellbeing  Recent Flowsheet Documentation  Taken 8/11/2023 0810 by Raisa Chowdhury RN  Diversional Activities:   smartphone   television   Goal Outcome Evaluation:         Patient to discharge  home.

## 2023-08-11 NOTE — OUTREACH NOTE
Prep Survey      Flowsheet Row Responses   [facility] patient discharged from? [facility]   Is LACE score < 7 ? Yes   Eligibility [facility]   Date of Admission 08/09/23   Date of Discharge 08/11/23   Discharge Disposition Home or Self Care   Discharge diagnosis CYSTOSCOPY, RIGHT URETEROSCOPY, RETROGRADE PYELOGRAM HOLMIUM LASER, STONE EXTRACTION AND  STENT INSERTION   Does the patient have one of the following disease processes/diagnoses(primary or secondary)? Other   Does the patient have Home health ordered? No   Is there a DME ordered? No   Prep survey completed? Yes            Liberty NGUYEN - Registered Nurse

## 2023-08-13 LAB
TESTOST FREE SERPL-MCNC: 3.1 PG/ML (ref 6.6–18.1)
TESTOST SERPL-MCNC: 341.1 NG/DL (ref 264–916)

## 2023-08-14 ENCOUNTER — TRANSITIONAL CARE MANAGEMENT TELEPHONE ENCOUNTER (OUTPATIENT)
Dept: CALL CENTER | Facility: HOSPITAL | Age: 63
End: 2023-08-14
Payer: COMMERCIAL

## 2023-08-14 ENCOUNTER — TELEPHONE (OUTPATIENT)
Dept: FAMILY MEDICINE CLINIC | Facility: CLINIC | Age: 63
End: 2023-08-14
Payer: COMMERCIAL

## 2023-08-14 NOTE — TELEPHONE ENCOUNTER
----- Message from VIKTORIYA Loomis sent at 8/14/2023  7:33 AM EDT -----  Please call Ty and tell him that his repeat testosterone is increased and is within the normal range.  Thank you

## 2023-08-14 NOTE — OUTREACH NOTE
Call Center TCM Note      Flowsheet Row Responses   [facility] [place] patient discharged from? [facility]   Does the patient have one of the following disease processes/diagnoses(primary or secondary)? Other   TCM attempt successful? Yes   Call start time 1017   Call end time 1019   Discharge diagnosis CYSTOSCOPY, RIGHT URETEROSCOPY, RETROGRADE PYELOGRAM HOLMIUM LASER, STONE EXTRACTION AND  STENT INSERTION   Meds reviewed with patient/caregiver? Yes   Is the patient having any side effects they believe may be caused by any medication additions or changes? No   Does the patient have all medications ordered at discharge? Yes   Is the patient taking all medications as directed (includes completed medication regime)? Yes   Does the patient have an appointment with their PCP within 7-14 days of discharge? No   Nursing Interventions Patient declined scheduling/rescheduling appointment at this time   Has home health visited the patient within 72 hours of discharge? N/A   Psychosocial issues? No   Did the patient receive a copy of their discharge instructions? Yes   Nursing interventions Reviewed instructions with patient   What is the patient's perception of their health status since discharge? Improving   Is the patient/caregiver able to teach back signs and symptoms related to disease process for when to call PCP? Yes   Is the patient/caregiver able to teach back signs and symptoms related to disease process for when to call 911? Yes   Is the patient/caregiver able to teach back the hierarchy of who to call/visit for symptoms/problems? PCP, Specialist, Home health nurse, Urgent Care, ED, 911 Yes   If the patient is a current smoker, are they able to teach back resources for cessation? Not a smoker   TCM call completed? Yes   Call end time 1019   Would this patient benefit from a Referral to Amb Social Work? No   Is the patient interested in additional calls from an ambulatory ? No            Anya Carter,  LPN    8/14/2023, 10:22 EDT

## 2023-08-15 ENCOUNTER — TELEPHONE (OUTPATIENT)
Dept: CARDIOLOGY | Facility: CLINIC | Age: 63
End: 2023-08-15
Payer: COMMERCIAL

## 2023-08-15 RX ORDER — METOPROLOL SUCCINATE 25 MG/1
25 TABLET, EXTENDED RELEASE ORAL DAILY
Qty: 30 TABLET | Refills: 11 | Status: CANCELLED
Start: 2023-08-15

## 2023-08-15 RX ORDER — METOPROLOL SUCCINATE 25 MG/1
25 TABLET, EXTENDED RELEASE ORAL DAILY
Qty: 30 TABLET | Refills: 11
Start: 2023-08-15

## 2023-08-15 NOTE — TELEPHONE ENCOUNTER
Rx Refill Note  Requested Prescriptions      No prescriptions requested or ordered in this encounter      Last office visit with prescribing clinician: 8/1/2023   Last telemedicine visit with prescribing clinician: Visit date not found   Next office visit with prescribing clinician: 8/6/2024                             Shanel Rogel MA  08/15/23, 15:31 EDT    Sent to pharmacy

## 2023-08-15 NOTE — TELEPHONE ENCOUNTER
Caller: Ty Bergeron    Relationship: Self    Best call back number: [phone]    What medications are you currently taking:   Current Outpatient Medications on File Prior to Visit   Medication Sig Dispense Refill    aspirin 81 MG EC tablet Take 1 tablet by mouth Daily. 30 tablet 11    cefdinir (OMNICEF) 300 MG capsule Take 1 capsule by mouth 2 (Two) Times a Day. 20 capsule 0    cetirizine (zyrTEC) 10 MG tablet Take 1 tablet by mouth Daily.      citalopram (CeleXA) 20 MG tablet Take 1 tablet by mouth Daily. 90 tablet 1    HYDROcodone-acetaminophen (NORCO)  MG per tablet Take 1 tablet by mouth Every 6 (Six) Hours As Needed for Moderate Pain. 30 tablet 0    HYDROcodone-acetaminophen (NORCO) 5-325 MG per tablet 1 tablet Every 4 (Four) Hours As Needed for Severe Pain.      lisinopril (PRINIVIL,ZESTRIL) 40 MG tablet Take 1 tablet by mouth Daily.      meloxicam (MOBIC) 15 MG tablet Take 1 tablet by mouth Daily As Needed for Mild Pain. 30 tablet 1    metoprolol succinate XL (TOPROL-XL) 25 MG 24 hr tablet Take 1 tablet by mouth Daily. 30 tablet 11    rivaroxaban (XARELTO) 20 MG tablet Take 1 tablet by mouth Daily.      rosuvastatin (CRESTOR) 10 MG tablet Take 1 tablet by mouth Daily. 90 tablet 1    sildenafil (VIAGRA) 100 MG tablet Take 1 tablet by mouth Daily As Needed for Erectile Dysfunction.      traZODone (DESYREL) 150 MG tablet TAKE ONE TABLET BY MOUTH EVERY NIGHT AT BEDTIME 30 tablet 3    [DISCONTINUED] metoprolol tartrate (LOPRESSOR) 25 MG tablet Take 1 tablet by mouth 2 (Two) Times a Day. 180 tablet 3     No current facility-administered medications on file prior to visit.          When did you start taking these medications: 8/1/23    Which medication are you concerned about: METOPROLOL    Who prescribed you this medication: DR NAILS, CHANGE MEDICATION FROM 1/2 TABLET TO ONE TABLET    What are your concerns: PHARMACY DOES NOT SHOW MEDICATION REFILL FOR NEW DOSAGE. PLEASE RESEND MEDICATION  REFILL TO [facility]

## 2023-08-15 NOTE — TELEPHONE ENCOUNTER
Rx Refill Note  Requested Prescriptions      No prescriptions requested or ordered in this encounter      Last office visit with prescribing clinician: 8/1/2023   Last telemedicine visit with prescribing clinician: Visit date not found   Next office visit with prescribing clinician: 8/6/2024                           Shanel Rogel MA  08/15/23, 12:30 EDT    Refill was sent 6 days ago.

## 2023-08-17 RX ORDER — LISINOPRIL 40 MG/1
40 TABLET ORAL DAILY
Qty: 90 TABLET | Refills: 1 | Status: SHIPPED | OUTPATIENT
Start: 2023-08-17

## 2023-08-18 LAB
CALCIUM OXALATE DIHYDRATE MFR STONE IR: 20 %
COLOR STONE: NORMAL
COM MFR STONE: 70 %
COMPN STONE: NORMAL
HYDROXYAPATITE: 10 %
LABORATORY COMMENT REPORT: NORMAL
LABORATORY COMMENT REPORT: NORMAL
Lab: NORMAL
Lab: NORMAL
PHOTO: NORMAL
SIZE STONE: NORMAL MM
SPEC SOURCE SUBJ: NORMAL
STONE ANALYSIS-IMP: NORMAL
WT STONE: 20 MG

## 2023-10-12 ENCOUNTER — HOSPITAL ENCOUNTER (OUTPATIENT)
Dept: CARDIOLOGY | Facility: HOSPITAL | Age: 63
Discharge: HOME | End: 2023-10-12
Admitting: UROLOGY
Payer: COMMERCIAL

## 2023-10-12 ENCOUNTER — TRANSCRIBE ORDERS (OUTPATIENT)
Dept: ADMINISTRATIVE | Facility: HOSPITAL | Age: 63
End: 2023-10-12
Payer: COMMERCIAL

## 2023-10-12 DIAGNOSIS — Z01.818 PRE-OP EXAMINATION: Primary | ICD-10-CM

## 2023-10-12 DIAGNOSIS — Z01.818 PRE-OP EXAMINATION: ICD-10-CM

## 2023-10-12 LAB
QT INTERVAL: 390 MS
QTC INTERVAL: 426 MS

## 2023-10-12 PROCEDURE — 93005 ELECTROCARDIOGRAM TRACING: CPT | Performed by: UROLOGY

## 2023-10-17 ENCOUNTER — LAB REQUISITION (OUTPATIENT)
Dept: LAB | Facility: HOSPITAL | Age: 63
End: 2023-10-17
Payer: COMMERCIAL

## 2023-10-17 DIAGNOSIS — N20.1 CALCULUS OF URETER: ICD-10-CM

## 2023-10-17 PROCEDURE — 82365 CALCULUS SPECTROSCOPY: CPT | Performed by: UROLOGY

## 2023-10-25 LAB
CALCIUM OXALATE DIHYDRATE MFR STONE IR: 20 %
COLOR STONE: NORMAL
COM MFR STONE: 75 %
COMPN STONE: NORMAL
HYDROXYAPATITE: 5 %
LABORATORY COMMENT REPORT: NORMAL
Lab: NORMAL
Lab: NORMAL
PHOTO: NORMAL
SIZE STONE: NORMAL MM
SPEC SOURCE SUBJ: NORMAL
STONE ANALYSIS-IMP: NORMAL
WT STONE: 66 MG

## 2023-10-25 RX ORDER — RIVAROXABAN 20 MG/1
20 TABLET, FILM COATED ORAL DAILY
Qty: 30 TABLET | Refills: 1 | Status: SHIPPED | OUTPATIENT
Start: 2023-10-25

## 2023-10-31 ENCOUNTER — OFFICE VISIT (OUTPATIENT)
Dept: FAMILY MEDICINE CLINIC | Facility: CLINIC | Age: 63
End: 2023-10-31
Payer: COMMERCIAL

## 2023-10-31 VITALS
WEIGHT: 295 LBS | SYSTOLIC BLOOD PRESSURE: 130 MMHG | HEART RATE: 88 BPM | BODY MASS INDEX: 42.23 KG/M2 | HEIGHT: 70 IN | RESPIRATION RATE: 18 BRPM | DIASTOLIC BLOOD PRESSURE: 80 MMHG | OXYGEN SATURATION: 95 %

## 2023-10-31 DIAGNOSIS — J06.9 ACUTE URI: Primary | ICD-10-CM

## 2023-10-31 PROCEDURE — 99213 OFFICE O/P EST LOW 20 MIN: CPT | Performed by: NURSE PRACTITIONER

## 2023-10-31 RX ORDER — TRIAMCINOLONE ACETONIDE 40 MG/ML
40 INJECTION, SUSPENSION INTRA-ARTICULAR; INTRAMUSCULAR ONCE
Status: COMPLETED | OUTPATIENT
Start: 2023-10-31 | End: 2023-10-31

## 2023-10-31 RX ORDER — AZITHROMYCIN 250 MG/1
TABLET, FILM COATED ORAL
Qty: 6 TABLET | Refills: 0 | Status: SHIPPED | OUTPATIENT
Start: 2023-10-31

## 2023-10-31 RX ORDER — BENZONATATE 200 MG/1
200 CAPSULE ORAL 3 TIMES DAILY PRN
Qty: 30 CAPSULE | Refills: 1 | Status: SHIPPED | OUTPATIENT
Start: 2023-10-31 | End: 2023-11-13

## 2023-10-31 RX ORDER — ALFUZOSIN HYDROCHLORIDE 10 MG/1
TABLET, EXTENDED RELEASE ORAL
COMMUNITY
Start: 2023-10-24

## 2023-10-31 RX ADMIN — TRIAMCINOLONE ACETONIDE 40 MG: 40 INJECTION, SUSPENSION INTRA-ARTICULAR; INTRAMUSCULAR at 15:05

## 2023-10-31 NOTE — PROGRESS NOTES
"Chief Complaint  Cough  Subjective        Ty Bergeron presents to [facility]  History of Present Illness  Pt comes in today with c/o nasal and chest congestion. Started about 2 weeks ago. No fever or chills.  Went to [facility] last week and was prescribed prednisone and anbx, but already took those and helped a little.   Trouble sleeping 2/2 cough. Taking nyquil and dayquil otc.  Nose is stopped up.   Finished anbx yesterday.   Cough      Review of Systems   Respiratory:  Positive for cough.      Objective     Vital Signs:   /80 (BP Location: Left arm)   Pulse 88   Resp 18   Ht 177.8 cm (70\")   Wt 134 kg (295 lb)   SpO2 95%   BMI 42.33 kg/m²       BP Readings from Last 3 Encounters:   10/31/23 130/80   08/11/23 108/66   08/01/23 110/79       Wt Readings from Last 3 Encounters:   10/31/23 134 kg (295 lb)   08/09/23 134 kg (294 lb 12.1 oz)   08/01/23 132 kg (290 lb)     Physical Exam  Constitutional:       Appearance: He is well-developed.   HENT:      Right Ear: Tympanic membrane normal.      Left Ear: Tympanic membrane normal.      Nose: Congestion present.      Right Sinus: Maxillary sinus tenderness present.      Left Sinus: No maxillary sinus tenderness or frontal sinus tenderness.      Mouth/Throat:      Pharynx: Posterior oropharyngeal erythema present.   Eyes:      Pupils: Pupils are equal, round, and reactive to light.   Cardiovascular:      Rate and Rhythm: Normal rate and regular rhythm.   Pulmonary:      Effort: Pulmonary effort is normal. No respiratory distress.      Breath sounds: Normal breath sounds. Wheezing present. No rhonchi.   Neurological:      Mental Status: He is alert and oriented to person, place, and time.        Result Review :                 Assessment and Plan    Diagnoses and all orders for this visit:    1. Acute URI (Primary)  -     azithromycin (Zithromax Z-Maynor) 250 MG tablet; Take 2 tablets by mouth on day 1, then 1 tablet daily on " days 2-5  Dispense: 6 tablet; Refill: 0  -     benzonatate (TESSALON) 200 MG capsule; Take 1 capsule by mouth 3 (Three) Times a Day As Needed for Cough for up to 13 days.  Dispense: 30 capsule; Refill: 1    Start anbx  Kenalog inj today  Mucinex otc  During this office visit, we discussed the pertinent aspects of the visit and treatment recommendations. Pt verbalizes understanding. Follow up was discussed. Patient was given the opportunity to ask questions and discuss other concerns.         Follow Up   No follow-ups on file.  Patient was given instructions and counseling regarding his condition or for health maintenance advice. Please see specific information pulled into the AVS if appropriate.

## 2023-11-10 RX ORDER — METOPROLOL SUCCINATE 25 MG/1
25 TABLET, EXTENDED RELEASE ORAL DAILY
Qty: 90 TABLET | Refills: 3
Start: 2023-11-10

## 2023-11-10 NOTE — TELEPHONE ENCOUNTER
Rx Refill Note  Requested Prescriptions     Pending Prescriptions Disp Refills    metoprolol succinate XL (TOPROL-XL) 25 MG 24 hr tablet 90 tablet 3     Sig: Take 1 tablet by mouth Daily.      Last office visit with prescribing clinician: 8/1/2023   Last telemedicine visit with prescribing clinician: Visit date not found   Next office visit with prescribing clinician: 8/6/2024                         Would you like a call back once the refill request has been completed: [] Yes [] No    If the office needs to give you a call back, can they leave a voicemail: [] Yes [] No    Nadia Fisher MA  11/10/23, 12:05 EST

## 2023-11-10 NOTE — TELEPHONE ENCOUNTER
Called patient and verbally explained that when he picked up his refill on metoprolol it was his previous prescription.    I sent in new RX for the patient with new instructions.    -patient verbally understood and had no further questions or concerns

## 2023-11-10 NOTE — TELEPHONE ENCOUNTER
Spoke to patient and he said the pharmacy still does not have the right dosage and therefore is he is running out of pills. The bottle says take 1/2 tablet daily but he has been taking a whole one each day like requested by the doctor. He said he can  a written script if he needs to, but I told him we can probably call the pharmacy directly and see what is going on. Please call the patient and let him know once it has been corrected. He said he does have enough pills to get him through the weekend.

## 2023-11-17 RX ORDER — TRAZODONE HYDROCHLORIDE 150 MG/1
TABLET ORAL
Qty: 30 TABLET | Refills: 3 | Status: SHIPPED | OUTPATIENT
Start: 2023-11-17

## 2023-12-21 RX ORDER — RIVAROXABAN 20 MG/1
20 TABLET, FILM COATED ORAL DAILY
Qty: 30 TABLET | Refills: 1 | Status: SHIPPED | OUTPATIENT
Start: 2023-12-21

## 2023-12-26 RX ORDER — CITALOPRAM 20 MG/1
20 TABLET ORAL DAILY
Qty: 30 TABLET | Refills: 3 | Status: SHIPPED | OUTPATIENT
Start: 2023-12-26

## 2023-12-26 RX ORDER — ROSUVASTATIN CALCIUM 10 MG/1
10 TABLET, COATED ORAL DAILY
Qty: 30 TABLET | Refills: 3 | Status: SHIPPED | OUTPATIENT
Start: 2023-12-26

## 2024-01-19 ENCOUNTER — OFFICE VISIT (OUTPATIENT)
Dept: FAMILY MEDICINE CLINIC | Facility: CLINIC | Age: 64
End: 2024-01-19
Payer: COMMERCIAL

## 2024-01-19 VITALS
HEIGHT: 70 IN | SYSTOLIC BLOOD PRESSURE: 118 MMHG | WEIGHT: 280 LBS | OXYGEN SATURATION: 100 % | DIASTOLIC BLOOD PRESSURE: 84 MMHG | HEART RATE: 105 BPM | BODY MASS INDEX: 40.09 KG/M2 | RESPIRATION RATE: 20 BRPM

## 2024-01-19 DIAGNOSIS — I10 HYPERTENSION, UNSPECIFIED TYPE: Primary | ICD-10-CM

## 2024-01-19 PROCEDURE — 99213 OFFICE O/P EST LOW 20 MIN: CPT | Performed by: NURSE PRACTITIONER

## 2024-01-19 NOTE — PROGRESS NOTES
"Chief Complaint  Hypertension (Follow up )    Subjective          Ty Bergeron presents to [facility]  History of Present Illness    Is here today for follow up HTN    H/o HLD, HTN, a-fib, DVT, obesity, gerd, asplenia, anemia, anxiety, oa, lung nodule, aleep apnea   Current medicines are alfuzosin, asa, zyrtec, celexa, lisinopril 40 mg qd, mobic, metoprolol 25 mg qd, xarelto, crestor, sildenafil, trazodone    He does not check bp at home    Today the BP is initially 138/88, recheck is  118/84    He denies any c/o cp, soa, edema, ha, dizziness or weakness    Has lost 15# since his recent knee surgery and diet changes to prevent kidney stones    Review of Systems   Constitutional: Negative.  Negative for appetite change, fatigue and fever.   Respiratory: Negative.  Negative for shortness of breath.    Cardiovascular: Negative.  Negative for chest pain.   Neurological:  Negative for dizziness, weakness and headaches.        He endorses that he notices some dizziness when he stands up, it passes pretty quickly     Objective   Vital Signs:  /84 (BP Location: Left arm, Patient Position: Sitting)   Pulse 105   Resp 20   Ht 177.8 cm (70\")   Wt 127 kg (280 lb)   SpO2 100%   BMI 40.18 kg/m²     BP Readings from Last 3 Encounters:   01/19/24 118/84   10/31/23 130/80   08/11/23 108/66        Wt Readings from Last 3 Encounters:   01/19/24 127 kg (280 lb)   10/31/23 134 kg (295 lb)   08/09/23 134 kg (294 lb 12.1 oz)          Physical Exam  Vitals reviewed.   Constitutional:       Appearance: Normal appearance. He is obese.   Neck:      Vascular: No carotid bruit.   Cardiovascular:      Rate and Rhythm: Normal rate and regular rhythm.      Pulses: Normal pulses.      Heart sounds: Normal heart sounds.   Pulmonary:      Effort: Pulmonary effort is normal.      Breath sounds: Normal breath sounds.   Musculoskeletal:      Cervical back: Neck supple.      Right lower leg: No edema. "      Left lower leg: No edema.   Skin:     General: Skin is warm.   Neurological:      Mental Status: He is alert and oriented to person, place, and time.   Psychiatric:         Mood and Affect: Mood normal.        Result Review :                 Assessment and Plan    Diagnoses and all orders for this visit:    1. Hypertension, unspecified type (Primary)    Continue current medicines          Follow Up   Return in about 6 months (around 7/19/2024) for Annual physical.  Patient was given instructions and counseling regarding his condition or for health maintenance advice. Please see specific information pulled into the AVS if appropriate.

## 2024-01-29 RX ORDER — ASPIRIN 81 MG/1
81 TABLET, COATED ORAL DAILY
Qty: 30 TABLET | Refills: 11 | Status: SHIPPED | OUTPATIENT
Start: 2024-01-29

## 2024-02-22 RX ORDER — RIVAROXABAN 20 MG/1
20 TABLET, FILM COATED ORAL DAILY
Qty: 30 TABLET | Refills: 1 | Status: SHIPPED | OUTPATIENT
Start: 2024-02-22

## 2024-02-27 ENCOUNTER — OFFICE VISIT (OUTPATIENT)
Dept: CARDIOLOGY | Facility: CLINIC | Age: 64
End: 2024-02-27
Payer: COMMERCIAL

## 2024-02-27 VITALS
SYSTOLIC BLOOD PRESSURE: 118 MMHG | HEIGHT: 70 IN | HEART RATE: 87 BPM | RESPIRATION RATE: 18 BRPM | BODY MASS INDEX: 40.52 KG/M2 | WEIGHT: 283 LBS | DIASTOLIC BLOOD PRESSURE: 73 MMHG

## 2024-02-27 DIAGNOSIS — E78.49 OTHER HYPERLIPIDEMIA: ICD-10-CM

## 2024-02-27 DIAGNOSIS — I10 HYPERTENSION, UNSPECIFIED TYPE: Primary | ICD-10-CM

## 2024-02-29 NOTE — PROGRESS NOTES
Cardiology Clinic Note  Tevin Meza MD, PhD    Subjective:     Encounter Date:02/27/2024      Patient ID: Ty Bergeron is a 63 y.o. male.    Chief Complaint:  Chief Complaint   Patient presents with    Follow-up       HPI:       I the pleasure seeing this very pleasant 61-year-old gentleman is a new patient today for chief complaint of chest pain.  Blood pressure 160/78 heart rates in 70s.  He has risk factors of hypertension hyperlipidemia family history with brother with multivessel bypass surgery and his father had an MI in the past.  He is a non-smoker he is not diabetic but he has 3-4 beers a day and he drinks a lot of coffee every day as well.  He displays dyspnea on exertion and substernal chest pain with exertion predictable fashion concerning for obstructive coronary disease as well as CCS class III angina.  Per history he does have a history of thromboembolic disease with DVT PE which also may need to be examined his he is not presently on anticoagulation systemically if cardiac work-up is unremarkable.  He underwent left heart catheterization with coronary angiography, 2D echo.  He had structurally normal heart with normal LV and RV size and function, no significant valvular abnormality other than trace to mild regurgitation.  Blood pressures controlled today.  Coronary angiography revealed small arteries which tapers dramatically but no obstructive CAD other than mild luminal regularities and he had normal filling pressures.  Today on repeat encounter  He denies any new symptoms today is otherwise doing well no unexplained syncope no unexplained edema no shortness of breath above normal.  We discussed that he has no restrictions for any diet exercise or other exertional restrictions.  He is otherwise doing well on repeat encounter today.  We discussed calcium scoring for risk assessment and repeating yearly labs to ensure his lipids renal function electrolytes LFTs are all at goal.     Review of  "systems otherwise negative x 14 point review of systems except as mentioned above  Historical data copied forward from previous encounters in EMR including the history, exam, and assessment/plan has been reviewed and is unchanged unless noted otherwise.     Cardiac medicines reviewed with risk, benefits, and necessity of each discussed.     Risk and benefit of cardiac testing reviewed including death heart attack stroke pain bleeding infection need for vascular /cardiovascular surgery were discussed and the patient      Objective:      Vitals reviewed below     Physical Exam  Regular rate and rhythm no rubs gallops heaves left  1 out of 6 systolic ejection murmur lower sternal border  No clubbing cyanosis or edema  Obese soft nontender nondistended  Clear to auscultation  No carotid bruits or JVD  Change from prior encounter  Assessment:         Assessment   Shortness of breath dyspnea exertion  Appears stable  EF normal  Normal diastolic function  Normal RV size and function with normal atrial sizes  Trace to mild valvular insufficiency as documented, needs repeat echo in 3 to 5 years per guidelines  Primary prevention goals for CAD  Diet and exercise per AHA guidelines  Continue present medications    Repeat yearly labs  Follow lipids  Calcium scoring will be ordered  EKG unchanged     Return to clinic in 1 year  Back to primary care in the interim     Please call with any questions or concerns     Films for left heart catheterization were reviewed with patient at length previously, EKG is unchanged           /73 (BP Location: Right arm, Patient Position: Sitting)   Pulse 87   Resp 18   Ht 177.8 cm (70\")   Wt 128 kg (283 lb)   BMI 40.61 kg/m²     Diagnoses and all orders for this visit:    1. Hypertension, unspecified type (Primary)  -     CT Cardiac Calcium Score Without Dye; Future  -     Comprehensive Metabolic Panel; Future  -     CBC (No Diff); Future  -     Magnesium; Future    2. Other " hyperlipidemia  -     Lipid Panel; Future            The pleasure to be involved in this patient's cardiovascular care.  Please call with any questions or concerns  Tevin eMza MD, PhD    Most recent EKG as reviewed and interpreted by me:    ECG 12 Lead    Date/Time: 2/27/2024 10:55 AM  Performed by: Tevin Meza MD    Authorized by: Tevin Meza MD  Comparison: not compared with previous ECG   Previous ECG: no previous ECG available  Rhythm: sinus rhythm  Rate: normal  QRS axis: normal  Other findings: poor R wave progression    Clinical impression: non-specific ECG           Most recent echo as reviewed and interpreted by me:  Results for orders placed during the hospital encounter of 02/14/22    Adult Transthoracic Echo Complete W/ Cont if Necessary Per Protocol    Interpretation Summary  · Left ventricular systolic function is normal.  · Left ventricular ejection fraction is 60 to 65%  · Left ventricular diastolic function was normal.      Most recent stress test as reviewed and interpreted by me:      Most recent cardiac catheterization as reviewed interpreted by me:  Results for orders placed during the hospital encounter of 02/03/22    Cardiac Catheterization/Vascular Study    Narrative   Tevin Meza MD, PhD  [facility] cardiology  Date of service 2-3-22    Procedure  1 left heart catheterization with coronary angiography and left ventriculography in BATISTA position    Indication  Refractory exertional chest pain to medical therapy, high pretest probability given risk factors for CAD    After informed consent patient was brought to the catheterization lab sterilely prepped and draped in usual fashion exposure the right groin for right common femoral arterial access via micropuncture modified center technique, a 6 Kosovan sheath was placed under fluoroscopic guidance which was aspirated flushed with heparinized saline.  035 guidewire was advanced through eval  followed by diagnostic JL4 and JR4 catheters for selective left and right coronary angiography.  JR4 was used across aortic valve easily, EDP assessment pullback assessment of the transaortic valve gradient after hand-injection for LV gram demonstrated normal LV systolic function.  There was no obstructive CAD, all catheters were removed.  6 Japanese Angio-Seal was used to close right common femoral durotomy with immediate hemostasis and means of distal pulses.  There are no complications.  Patient left the Cath Lab chest pain-free hemodynamically electrically stable or talking to staff neurologically grossly tight bilaterally    Complications none  Blood loss less than 5 cc  Contrast used is 85 cc for the entirety procedure including LV gram  Moderate conscious sedation of 30 minutes with IV Versed and fentanyl administered by registered nurse with complete ECG pulse oximetry and hemodynamic monitoring throughout the entirety of the case observed by me    Findings  1.  Open aortic pressure was 82/40, 100 mcg of Jovani-Synephrine was given with a starting pressure for the case of greater than 100 systolic to ensure safety of the procedure  2.  Closing pressure 110/66  3.  LVEDP 5  4.  For LV function normal LVEF 65 to 70%  5.  No transaortic valve gradient or LVOT gradient seen on pullback    Angiography  1 left main medium caliber vessel with an acute angle takeoff but no obstructive disease giving LAD and nondominant circumflex  2.  The LAD is a medium caliber vessel coursing to and apex with 2 diagonal branches, there is lumen regularities only less than 20% throughout this distribution  3.  Nondominant circumflex with 1 obtuse marginal branch distally and a small recurrent left atrial branch, there is minimal angiographic disease throughout next  #4 RCA is a very large caliber vessel with large PLV and PDA segments which reaches the apex, there is no angiographic disease throughout the RCA  distribution    Angiographically left-sided vessels appear very small, appears to taper distally might have microvascular disease or microvascular dysfunction with mildly slow filling despite normalization of blood pressures with Jovani-Synephrine and low LVEDP.    Conclusions and recommendations  1.  Normal epicardial coronary anatomy, no epicardial obstructive CAD, low LVEDP at 4 to 5 mmHg, normal LV systolic function and normal transaortic valve gradient  2.  Encourage hydration  3.  Optimize antihypertensive, minimize hypotension, consider stopping calcium channel blockers  4.  No restrictions, diet and exercise is recommended given obesity and likely significant deconditioning with BMI greater than 40, likely microvascular function metabolic syndrome    Patient be discharged safely home today    Tevin Meza MD, PhD    The following portions of the patient's history were reviewed and updated as appropriate: allergies, current medications, past family history, past medical history, past social history, past surgical history, and problem list.      ROS:  14 point review of systems negative except as mentioned above    Current Outpatient Medications:     alfuzosin (UROXATRAL) 10 MG 24 hr tablet, , Disp: , Rfl:     Aspirin Low Dose 81 MG EC tablet, TAKE ONE TABLET BY MOUTH DAILY, Disp: 30 tablet, Rfl: 11    cetirizine (zyrTEC) 10 MG tablet, Take 1 tablet by mouth Daily., Disp: , Rfl:     citalopram (CeleXA) 20 MG tablet, TAKE 1 TABLET BY MOUTH DAILY, Disp: 30 tablet, Rfl: 3    lisinopril (PRINIVIL,ZESTRIL) 40 MG tablet, TAKE 1 TABLET BY MOUTH DAILY, Disp: 90 tablet, Rfl: 1    meloxicam (MOBIC) 15 MG tablet, Take 1 tablet by mouth Daily As Needed for Mild Pain., Disp: 30 tablet, Rfl: 1    metoprolol succinate XL (TOPROL-XL) 25 MG 24 hr tablet, Take 1 tablet by mouth Daily., Disp: 90 tablet, Rfl: 3    rosuvastatin (CRESTOR) 10 MG tablet, TAKE 1 TABLET BY MOUTH DAILY, Disp: 30 tablet, Rfl: 3    sildenafil (VIAGRA) 100  MG tablet, Take 1 tablet by mouth Daily As Needed for Erectile Dysfunction., Disp: , Rfl:     traZODone (DESYREL) 150 MG tablet, TAKE ONE TABLET BY MOUTH EVERY NIGHT AT BEDTIME, Disp: 30 tablet, Rfl: 3    Xarelto 20 MG tablet, TAKE 1 TABLET BY MOUTH DAILY, Disp: 30 tablet, Rfl: 1    Problem List:  Patient Active Problem List   Diagnosis    Acute embolism and thrombosis of deep vein of lower extremity    Anemia    Anxiety    Calculus of gallbladder    Carpal tunnel syndrome    Depression    Gastroesophageal reflux disease    Hypercholesterolemia    Hyperglycemia    Hypertension    Hypogonadism in male    Physical exam    Rotator cuff tendonitis, right    Subacromial bursitis of right shoulder joint    Primary osteoarthritis of both knees    Leg cramps    Combined arterial insufficiency and corporo-venous occlusive erectile dysfunction    Thrombocytopenia    H/O splenectomy    Primary osteoarthritis of right shoulder    Lung nodule    Umbilical hernia    Sleep apnea    Personal history of endocrine, metabolic, and immunity disorder    Osteoarthritis of knee    Obesity    Nerve root disorder    Medial epicondylitis, left    Neck pain, acute    Angina, class III    Paroxysmal atrial fibrillation    Sick sinus syndrome     Past Medical History:  Past Medical History:   Diagnosis Date    Anxiety     Cholelithiasis     Depression     History of overdose years ago    DVT, bilateral lower limbs     GERD (gastroesophageal reflux disease)     Hyperlipidemia     Hypertension     Obesity     CASS (obstructive sleep apnea)     not treating    Pituitary microadenoma     Testosterone deficiency     Thrombocytopenia      Past Surgical History:  Past Surgical History:   Procedure Laterality Date    CARDIAC CATHETERIZATION N/A 02/03/2022    Procedure: Left Heart Cath;  Surgeon: Tevin Meza MD;  Location: [facility] CATH INVASIVE LOCATION;  Service: Cardiology;  Laterality: N/A;    CARDIAC CATHETERIZATION N/A 02/03/2022     Procedure: Coronary angiography;  Surgeon: Tevin Meza MD;  Location: [facility] CATH INVASIVE LOCATION;  Service: Cardiology;  Laterality: N/A;    CARDIAC CATHETERIZATION N/A 02/03/2022    Procedure: Left ventriculography;  Surgeon: Tevin Meza MD;  Location: [facility] CATH INVASIVE LOCATION;  Service: Cardiology;  Laterality: N/A;    CARDIAC ELECTROPHYSIOLOGY PROCEDURE N/A 06/30/2023    Procedure: Ablation atrial fibrillation, RF, rep sent email;  Surgeon: Sukumar Brady MD;  Location: [facility] CATH INVASIVE LOCATION;  Service: Cardiovascular;  Laterality: N/A;    COLONOSCOPY      2011- due 5 years     CYSTOSCOPY, URETEROSCOPY, RETROGRADE PYELOGRAM, STENT INSERTION Right 8/10/2023    Procedure: CYSTOSCOPY, RIGHT URETEROSCOPY, RETROGRADE PYELOGRAM HOLMIUM LASER, STONE EXTRACTION AND  STENT INSERTION (NO STRING);  Surgeon: Ty Tucker MD;  Location: [facility] MAIN OR;  Service: Urology;  Laterality: Right;    HERNIA REPAIR      INGUINAL HERNIA REPAIR      JOINT REPLACEMENT      REPLACEMENT TOTAL KNEE Left 07/12/2023    SPLENECTOMY      TOTAL KNEE ARTHROPLASTY Left     TOTAL SHOULDER REPLACEMENT  02/12/2021    VASECTOMY       Social History:  Social History     Socioeconomic History    Marital status:    Tobacco Use    Smoking status: Never     Passive exposure: Never    Smokeless tobacco: Never   Vaping Use    Vaping Use: Never used   Substance and Sexual Activity    Alcohol use: Yes     Alcohol/week: 20.0 standard drinks of alcohol     Types: 20 Cans of beer per week    Drug use: No    Sexual activity: Not Currently     Partners: Female     Birth control/protection: Condom     Allergies:  Allergies   Allergen Reactions    Lipitor [Atorvastatin Calcium] Myalgia     Immunizations:  Immunization History   Administered Date(s) Administered    COVID-19 (PFIZER) Purple Cap Monovalent 03/13/2021, 04/03/2021, 01/08/2022    DTaP 04/27/2016    Fluzone Quad >6mos (Multi-dose) 10/07/2020     Meningococcal B,(Bexsero) 10/31/2018, 01/17/2019    Meningococcal Conjugate 04/27/2016, 07/27/2016    Pneumococcal Conjugate 13-Valent (PCV13) 04/27/2016    Pneumococcal Polysaccharide (PPSV23) 07/27/2016    Shingrix 08/12/2020, 10/06/2020, 02/26/2021    Tdap 04/27/2016            In-Office Procedure(s):  No orders to display        ASCVD RIsk Score::  The ASCVD Risk score (Adia GAMBOA, et al., 2019) failed to calculate for the following reasons:    The patient has a prior MI or stroke diagnosis    Imaging:    Results for orders placed in visit on 02/05/21    SCANNED - IMAGING       Results for orders placed during the hospital encounter of 08/09/23    CT Abdomen Pelvis Without Contrast    Narrative  CT ABDOMEN PELVIS WO CONTRAST    Date of Exam: 8/9/2023 8:55 PM EDT    Indication: Right flank and right lower quadrant pain.    Comparison: None available.    Technique: Axial CT images were obtained of the abdomen and pelvis without the administration of contrast. Sagittal and coronal reconstructions were performed.  Automated exposure control and iterative reconstruction methods were used.      Findings:  Visualized Chest: Patchy opacities in the right lung base most likely represents atelectasis. Mild elevation of the right hemidiaphragm. Otherwise the visualized lung bases and lower mediastinal structures are unremarkable.    Liver: Liver is normal in size and CT density. No focal lesions.    Gallbladder: The gallbladder is not distended. No evidence of pericholecystic fluid or wall thickening. A single 0.8 cm calcified stone is noted within the gallbladder neck.    Bile Ducts: No billiary dcutal dilation.    Spleen: Status post splenectomy.    Pancreas: Pancreas is normal. There is no evidence of pancreatic mass or peripancreatic fluid.    Adrenals: Adrenal glands are unremarkable.    Kidneys: 0.6 cm obstructing stone within the proximal right ureter causing mild to moderate right-sided hydronephrosis. There is  significant perinephric fat stranding and edema. Additional nonobstructing stone in the left upper pole. No obstruction or  hydronephrosis on the left.      Gastrointestinal: The bowel loops are non-dilated without wall thickening or mass. The appendix appears within normal limits. No evidence of obstruction. No free air. No mesenteric fluid collections identified.    Bladder: The bladder is completely decompressed limiting its evaluation.    Pelvis:  No suspecious mass.    Peritoneum/Mesentery: No fluid collection, ascities, or free air.    Lymph Nodes: No lymphadenopathy.    Vasculature: Unremarkable    Abdominal Wall: Unremarkable    Bony Structures: No acute osseous abnormality. Multilevel degenerative changes throughout the lumbar spine. Chronic appearing grade 1 retrolisthesis of L2 on L3.    Impression  Impression:  1.Findings consistent with a 0.6 cm calcified stone within the proximal right ureter causing mild to moderate right-sided hydronephrosis with significant inflammatory changes.  2.Additional punctate nonobstructing stone in the left kidney. No obstruction or hydronephrosis on the left.  3.0.8 cm stone within the gallbladder neck without CT evidence of acute cholecystitis or biliary ductal dilation            Electronically Signed: Raghav Almanza DO  8/9/2023 9:56 PM EDT  Workstation ID: [id number]      Results for orders placed during the hospital encounter of 08/09/23    CT Abdomen Pelvis Without Contrast    Narrative  CT ABDOMEN PELVIS WO CONTRAST    Date of Exam: 8/9/2023 8:55 PM EDT    Indication: Right flank and right lower quadrant pain.    Comparison: None available.    Technique: Axial CT images were obtained of the abdomen and pelvis without the administration of contrast. Sagittal and coronal reconstructions were performed.  Automated exposure control and iterative reconstruction methods were used.      Findings:  Visualized Chest: Patchy opacities in the right lung base most likely  represents atelectasis. Mild elevation of the right hemidiaphragm. Otherwise the visualized lung bases and lower mediastinal structures are unremarkable.    Liver: Liver is normal in size and CT density. No focal lesions.    Gallbladder: The gallbladder is not distended. No evidence of pericholecystic fluid or wall thickening. A single 0.8 cm calcified stone is noted within the gallbladder neck.    Bile Ducts: No billiary dcutal dilation.    Spleen: Status post splenectomy.    Pancreas: Pancreas is normal. There is no evidence of pancreatic mass or peripancreatic fluid.    Adrenals: Adrenal glands are unremarkable.    Kidneys: 0.6 cm obstructing stone within the proximal right ureter causing mild to moderate right-sided hydronephrosis. There is significant perinephric fat stranding and edema. Additional nonobstructing stone in the left upper pole. No obstruction or  hydronephrosis on the left.      Gastrointestinal: The bowel loops are non-dilated without wall thickening or mass. The appendix appears within normal limits. No evidence of obstruction. No free air. No mesenteric fluid collections identified.    Bladder: The bladder is completely decompressed limiting its evaluation.    Pelvis:  No suspecious mass.    Peritoneum/Mesentery: No fluid collection, ascities, or free air.    Lymph Nodes: No lymphadenopathy.    Vasculature: Unremarkable    Abdominal Wall: Unremarkable    Bony Structures: No acute osseous abnormality. Multilevel degenerative changes throughout the lumbar spine. Chronic appearing grade 1 retrolisthesis of L2 on L3.    Impression  Impression:  1.Findings consistent with a 0.6 cm calcified stone within the proximal right ureter causing mild to moderate right-sided hydronephrosis with significant inflammatory changes.  2.Additional punctate nonobstructing stone in the left kidney. No obstruction or hydronephrosis on the left.  3.0.8 cm stone within the gallbladder neck without CT evidence of acute  cholecystitis or biliary ductal dilation            Electronically Signed: Raghav Almanza DO  8/9/2023 9:56 PM EDT  Workstation ID: [id number]      Lab Review:   Lab Requisition on 10/17/2023   Component Date Value    Stone Source 10/17/2023 Comment     Color 10/17/2023 Brown     Size 10/17/2023 7x5     Stone Weight 10/17/2023 66     Composition 10/17/2023 Comment     Ca Oxalate - Monohydrate* 10/17/2023 75     Ca Oxalate-Dihydrate, St* 10/17/2023 20     HYDROXYAPATITE 10/17/2023 5     Comment 10/17/2023 Comment     Photo 10/17/2023 Comment     Comments: 10/17/2023 Comment     Please note 10/17/2023 Comment     Disclaimer: 10/17/2023 Comment    Hospital Outpatient Visit on 10/12/2023   Component Date Value    QT Interval 10/12/2023 390     QTC Interval 10/12/2023 426      Recent labs reviewed and interpreted for clinical significance and application            Level of Care:           Tevin Meza MD  02/29/24  .

## 2024-03-14 RX ORDER — TRAZODONE HYDROCHLORIDE 150 MG/1
TABLET ORAL
Qty: 30 TABLET | Refills: 3 | Status: SHIPPED | OUTPATIENT
Start: 2024-03-14

## 2024-04-08 ENCOUNTER — HOSPITAL ENCOUNTER (OUTPATIENT)
Dept: CT IMAGING | Facility: HOSPITAL | Age: 64
Discharge: HOME | End: 2024-04-08
Admitting: INTERNAL MEDICINE

## 2024-04-08 DIAGNOSIS — I10 HYPERTENSION, UNSPECIFIED TYPE: ICD-10-CM

## 2024-04-08 PROCEDURE — 75571 CT HRT W/O DYE W/CA TEST: CPT

## 2024-04-22 RX ORDER — RIVAROXABAN 20 MG/1
20 TABLET, FILM COATED ORAL DAILY
Qty: 30 TABLET | Refills: 1 | Status: SHIPPED | OUTPATIENT
Start: 2024-04-22

## 2024-04-22 RX ORDER — ROSUVASTATIN CALCIUM 10 MG/1
10 TABLET, COATED ORAL DAILY
Qty: 30 TABLET | Refills: 3 | Status: SHIPPED | OUTPATIENT
Start: 2024-04-22

## 2024-04-25 RX ORDER — CITALOPRAM 20 MG/1
20 TABLET ORAL DAILY
Qty: 30 TABLET | Refills: 3 | Status: SHIPPED | OUTPATIENT
Start: 2024-04-25

## 2024-08-22 ENCOUNTER — LAB (OUTPATIENT)
Dept: FAMILY MEDICINE CLINIC | Facility: CLINIC | Age: 64
End: 2024-08-22
Payer: COMMERCIAL

## 2024-08-22 ENCOUNTER — OFFICE VISIT (OUTPATIENT)
Dept: FAMILY MEDICINE CLINIC | Facility: CLINIC | Age: 64
End: 2024-08-22
Payer: COMMERCIAL

## 2024-08-22 VITALS
WEIGHT: 295 LBS | HEIGHT: 70 IN | BODY MASS INDEX: 42.23 KG/M2 | HEART RATE: 57 BPM | SYSTOLIC BLOOD PRESSURE: 125 MMHG | RESPIRATION RATE: 18 BRPM | OXYGEN SATURATION: 95 % | DIASTOLIC BLOOD PRESSURE: 68 MMHG

## 2024-08-22 DIAGNOSIS — Z00.00 ENCOUNTER FOR ANNUAL PHYSICAL EXAM: Primary | ICD-10-CM

## 2024-08-22 DIAGNOSIS — Z12.5 SCREENING PSA (PROSTATE SPECIFIC ANTIGEN): ICD-10-CM

## 2024-08-22 DIAGNOSIS — E78.49 OTHER HYPERLIPIDEMIA: ICD-10-CM

## 2024-08-22 DIAGNOSIS — I10 HYPERTENSION, UNSPECIFIED TYPE: ICD-10-CM

## 2024-08-22 DIAGNOSIS — Z00.00 ENCOUNTER FOR ANNUAL PHYSICAL EXAM: ICD-10-CM

## 2024-08-22 LAB
25(OH)D3 SERPL-MCNC: 18.4 NG/ML (ref 30–100)
ALBUMIN SERPL-MCNC: 4 G/DL (ref 3.5–5.2)
ALBUMIN/GLOB SERPL: 1.4 G/DL
ALP SERPL-CCNC: 78 U/L (ref 39–117)
ALT SERPL W P-5'-P-CCNC: 20 U/L (ref 1–41)
ANION GAP SERPL CALCULATED.3IONS-SCNC: 9.4 MMOL/L (ref 5–15)
AST SERPL-CCNC: 22 U/L (ref 1–40)
BILIRUB SERPL-MCNC: 0.4 MG/DL (ref 0–1.2)
BUN SERPL-MCNC: 15 MG/DL (ref 8–23)
BUN/CREAT SERPL: 13.8 (ref 7–25)
CALCIUM SPEC-SCNC: 9.3 MG/DL (ref 8.6–10.5)
CHLORIDE SERPL-SCNC: 101 MMOL/L (ref 98–107)
CHOLEST SERPL-MCNC: 179 MG/DL (ref 0–200)
CO2 SERPL-SCNC: 25.6 MMOL/L (ref 22–29)
CREAT SERPL-MCNC: 1.09 MG/DL (ref 0.76–1.27)
DEPRECATED RDW RBC AUTO: 43.7 FL (ref 37–54)
EGFRCR SERPLBLD CKD-EPI 2021: 76.3 ML/MIN/1.73
ERYTHROCYTE [DISTWIDTH] IN BLOOD BY AUTOMATED COUNT: 13.7 % (ref 12.3–15.4)
GLOBULIN UR ELPH-MCNC: 2.9 GM/DL
GLUCOSE SERPL-MCNC: 96 MG/DL (ref 65–99)
HBA1C MFR BLD: 5.7 % (ref 4.8–5.6)
HCT VFR BLD AUTO: 46.5 % (ref 37.5–51)
HDLC SERPL-MCNC: 63 MG/DL (ref 40–60)
HGB BLD-MCNC: 15.7 G/DL (ref 13–17.7)
LDLC SERPL CALC-MCNC: 104 MG/DL (ref 0–100)
LDLC/HDLC SERPL: 1.64 {RATIO}
MAGNESIUM SERPL-MCNC: 2.2 MG/DL (ref 1.6–2.4)
MCH RBC QN AUTO: 30.1 PG (ref 26.6–33)
MCHC RBC AUTO-ENTMCNC: 33.8 G/DL (ref 31.5–35.7)
MCV RBC AUTO: 89.1 FL (ref 79–97)
PLATELET # BLD AUTO: 189 10*3/MM3 (ref 140–450)
PMV BLD AUTO: 12.2 FL (ref 6–12)
POTASSIUM SERPL-SCNC: 4.6 MMOL/L (ref 3.5–5.2)
PROT SERPL-MCNC: 6.9 G/DL (ref 6–8.5)
PSA SERPL-MCNC: 0.37 NG/ML (ref 0–4)
RBC # BLD AUTO: 5.22 10*6/MM3 (ref 4.14–5.8)
SODIUM SERPL-SCNC: 136 MMOL/L (ref 136–145)
TRIGL SERPL-MCNC: 63 MG/DL (ref 0–150)
TSH SERPL DL<=0.05 MIU/L-ACNC: 3.67 UIU/ML (ref 0.27–4.2)
VLDLC SERPL-MCNC: 12 MG/DL (ref 5–40)
WBC NRBC COR # BLD AUTO: 6.24 10*3/MM3 (ref 3.4–10.8)

## 2024-08-22 PROCEDURE — G0103 PSA SCREENING: HCPCS | Performed by: INTERNAL MEDICINE

## 2024-08-22 PROCEDURE — 83735 ASSAY OF MAGNESIUM: CPT | Performed by: INTERNAL MEDICINE

## 2024-08-22 PROCEDURE — 80053 COMPREHEN METABOLIC PANEL: CPT | Performed by: INTERNAL MEDICINE

## 2024-08-22 PROCEDURE — 85027 COMPLETE CBC AUTOMATED: CPT | Performed by: INTERNAL MEDICINE

## 2024-08-22 PROCEDURE — 84443 ASSAY THYROID STIM HORMONE: CPT | Performed by: NURSE PRACTITIONER

## 2024-08-22 PROCEDURE — 36415 COLL VENOUS BLD VENIPUNCTURE: CPT

## 2024-08-22 PROCEDURE — 99396 PREV VISIT EST AGE 40-64: CPT | Performed by: NURSE PRACTITIONER

## 2024-08-22 PROCEDURE — 83036 HEMOGLOBIN GLYCOSYLATED A1C: CPT | Performed by: NURSE PRACTITIONER

## 2024-08-22 PROCEDURE — 80061 LIPID PANEL: CPT | Performed by: INTERNAL MEDICINE

## 2024-08-22 PROCEDURE — 82306 VITAMIN D 25 HYDROXY: CPT | Performed by: NURSE PRACTITIONER

## 2024-08-22 NOTE — PROGRESS NOTES
Chief Complaint  Annual Exam    Subjective          Ty Bergeron presents to [facility]  History of Present Illness    Is here today for annual physical    Has recently retired    He has h/o DVT, HLD, HTN, angina, a-fib, thrombocytopenia, hyperglycemia, hypogonadism, obesity, gallstones, gerd, anemia, anxiety, depression, OA, lung nodule, sleep apnea, kidney stones    Current medicines are alfuzosin, asa, zyrtec, citalopram, lisinopril 40 mg qd, meloxicam, metoprolol xl 25 mg qd, crestor 10 mg, sildenafil 100 mg prn, trazodone 150 mg, xarelto    He is working to improve his diet, has been increasing salads and cut back on nuts    Is not exercising regularly, is playing golf some    Colon cancer screening per colonoscopy, last 5/19/2020 with plan to repeat in 5 years    He is fasting for labs, he has lab orders for CBC,CMP, Lipids, and magnesium per Dr. Meza    Review of Systems   Constitutional:  Positive for fatigue. Negative for activity change and appetite change.        Feels more tired than usual over the past couple of months   Respiratory:  Negative for chest tightness, shortness of breath and wheezing.    Cardiovascular:  Positive for chest pain. Negative for palpitations.        Endorses that he has been having some chest pain, has cut back on the xarelto, is taking it every other day, he has not bene having any chest pain since doing that    Gastrointestinal: Negative.  Negative for constipation and diarrhea.   Genitourinary:  Negative for dysuria, frequency and urgency.   Musculoskeletal:  Negative for arthralgias and myalgias.   Allergic/Immunologic: Positive for environmental allergies. Negative for food allergies.   Neurological:  Negative for dizziness, weakness and headaches.        Has h/o  tremor in the right hand, is an intention tremor and seems to be getting worse, he does not notice it in the left hand  He tells me that it has been ongoing for several  "years   Psychiatric/Behavioral: Negative.  Negative for dysphoric mood and sleep disturbance. The patient is not nervous/anxious.      Objective   Vital Signs:  /68   Pulse 57   Resp 18   Ht 177.8 cm (70\")   Wt 134 kg (295 lb)   SpO2 95%   BMI 42.33 kg/m²     BP Readings from Last 3 Encounters:   08/22/24 125/68   02/27/24 118/73   01/19/24 118/84        Wt Readings from Last 3 Encounters:   08/22/24 134 kg (295 lb)   02/27/24 128 kg (283 lb)   01/19/24 127 kg (280 lb)        Class 3 Severe Obesity (BMI >=40). Obesity-related health conditions include the following: obstructive sleep apnea, hypertension, dyslipidemias, and GERD. Obesity is worsening. BMI is is above average; BMI management plan is completed. We discussed low calorie, low carb based diet program, portion control, and increasing exercise.      Physical Exam  Vitals reviewed.   Constitutional:       Appearance: Normal appearance. He is obese.   HENT:      Right Ear: Tympanic membrane, ear canal and external ear normal.      Left Ear: Tympanic membrane, ear canal and external ear normal.      Nose: Nose normal.      Mouth/Throat:      Mouth: Mucous membranes are moist.      Pharynx: Oropharynx is clear.   Neck:      Thyroid: No thyromegaly.      Vascular: No carotid bruit.   Cardiovascular:      Rate and Rhythm: Normal rate and regular rhythm.      Pulses: Normal pulses.      Heart sounds: Normal heart sounds.   Pulmonary:      Effort: Pulmonary effort is normal.      Breath sounds: Normal breath sounds.   Abdominal:      General: Bowel sounds are normal.      Palpations: Abdomen is soft.      Tenderness: There is no abdominal tenderness. There is no right CVA tenderness or left CVA tenderness.   Musculoskeletal:         General: Normal range of motion.      Cervical back: Neck supple. No tenderness.      Right lower leg: No edema.      Left lower leg: No edema.   Lymphadenopathy:      Cervical: No cervical adenopathy.   Skin:     General: " Skin is warm.   Neurological:      Mental Status: He is alert and oriented to person, place, and time.   Psychiatric:         Mood and Affect: Mood normal.        Result Review :                 Assessment and Plan    Diagnoses and all orders for this visit:    1. Encounter for annual physical exam (Primary)  -     TSH Rfx On Abnormal To Free T4; Future  -     Vitamin D,25-Hydroxy; Future  -     Hemoglobin A1c; Future    2. Screening PSA (prostate specific antigen)  -     PSA Screen; Future           Follow Up   Return in about 1 year (around 8/22/2025), or as needed, for Annual physical.  Patient was given instructions and counseling regarding his condition or for health maintenance advice. Please see specific information pulled into the AVS if appropriate.

## 2024-08-27 PROBLEM — E55.9 VITAMIN D DEFICIENCY: Status: ACTIVE | Noted: 2024-08-27

## 2024-10-14 RX ORDER — ALFUZOSIN HYDROCHLORIDE 10 MG/1
10 TABLET, EXTENDED RELEASE ORAL DAILY
Qty: 90 TABLET | Refills: 1 | Status: SHIPPED | OUTPATIENT
Start: 2024-10-14

## 2024-10-15 ENCOUNTER — OFFICE VISIT (OUTPATIENT)
Dept: CARDIOLOGY | Facility: CLINIC | Age: 64
End: 2024-10-15
Payer: COMMERCIAL

## 2024-10-15 VITALS
OXYGEN SATURATION: 95 % | WEIGHT: 298 LBS | BODY MASS INDEX: 42.66 KG/M2 | DIASTOLIC BLOOD PRESSURE: 79 MMHG | SYSTOLIC BLOOD PRESSURE: 124 MMHG | HEART RATE: 76 BPM | HEIGHT: 70 IN

## 2024-10-15 DIAGNOSIS — I48.0 PAROXYSMAL ATRIAL FIBRILLATION: Primary | ICD-10-CM

## 2024-10-15 DIAGNOSIS — I10 PRIMARY HYPERTENSION: ICD-10-CM

## 2024-10-15 PROCEDURE — 93000 ELECTROCARDIOGRAM COMPLETE: CPT | Performed by: INTERNAL MEDICINE

## 2024-10-15 PROCEDURE — 99213 OFFICE O/P EST LOW 20 MIN: CPT | Performed by: INTERNAL MEDICINE

## 2024-10-15 NOTE — PROGRESS NOTES
Progress note      Name: Ty Bergeron ADMIT: (Not on file)   : 1960  PCP: Richard Darnell APRN    MRN: [id number] LOS: 0 days   AGE/SEX: 64 y.o. male  ROOM: Room/bed info not found     Chief Complaint   Patient presents with    Follow-up     1YR       Subjective       History of present illness  Ty Bergeron is a 64-year-old male patient who has history of hypertension, dyslipidemia, paroxysmal atrial fibrillation status post ablation on 2023, is here today for follow-up. Is doing well, denies any significant shortness of breath, no chest pain no syncopal episodes, complains of chronic fatigue and lack of energy.    Past Medical History:   Diagnosis Date    Anxiety     Cholelithiasis     Depression     History of overdose years ago    DVT, bilateral lower limbs     GERD (gastroesophageal reflux disease)     Hyperlipidemia     Hypertension     Obesity     CASS (obstructive sleep apnea)     not treating    Pituitary microadenoma     Testosterone deficiency     Thrombocytopenia      Past Surgical History:   Procedure Laterality Date    CARDIAC CATHETERIZATION N/A 2022    Procedure: Left Heart Cath;  Surgeon: Tevin Meza MD;  Location: [facility] CATH INVASIVE LOCATION;  Service: Cardiology;  Laterality: N/A;    CARDIAC CATHETERIZATION N/A 2022    Procedure: Coronary angiography;  Surgeon: Tevin Meza MD;  Location: [facility] CATH INVASIVE LOCATION;  Service: Cardiology;  Laterality: N/A;    CARDIAC CATHETERIZATION N/A 2022    Procedure: Left ventriculography;  Surgeon: Tevin Meza MD;  Location: [facility] CATH INVASIVE LOCATION;  Service: Cardiology;  Laterality: N/A;    CARDIAC ELECTROPHYSIOLOGY PROCEDURE N/A 2023    Procedure: Ablation atrial fibrillation, RF, rep sent email;  Surgeon: Sukumar Brady MD;  Location: [facility] CATH INVASIVE LOCATION;  Service: Cardiovascular;  Laterality: N/A;    COLONOSCOPY      - due 5 years      CYSTOSCOPY, URETEROSCOPY, RETROGRADE PYELOGRAM, STENT INSERTION Right 08/10/2023    Procedure: CYSTOSCOPY, RIGHT URETEROSCOPY, RETROGRADE PYELOGRAM HOLMIUM LASER, STONE EXTRACTION AND  STENT INSERTION (NO STRING);  Surgeon: Ty Tucker MD;  Location: [facility] MAIN OR;  Service: Urology;  Laterality: Right;    HERNIA REPAIR      INGUINAL HERNIA REPAIR      JOINT REPLACEMENT      REPLACEMENT TOTAL KNEE Left 07/12/2023    SPLENECTOMY      TOTAL KNEE ARTHROPLASTY Bilateral     TOTAL SHOULDER REPLACEMENT  02/12/2021    VASECTOMY       Family History   Problem Relation Age of Onset    Diabetes Father     Liver disease Father         transplant    Heart disease Mother     Diabetes Brother     Heart disease Brother      Social History     Tobacco Use    Smoking status: Never     Passive exposure: Never    Smokeless tobacco: Never   Vaping Use    Vaping status: Never Used   Substance Use Topics    Alcohol use: Yes     Alcohol/week: 20.0 standard drinks of alcohol     Types: 20 Cans of beer per week    Drug use: No       Current Outpatient Medications:     alfuzosin (UROXATRAL) 10 MG 24 hr tablet, Take 1 tablet by mouth Daily., Disp: 90 tablet, Rfl: 1    cetirizine (zyrTEC) 10 MG tablet, Take 1 tablet by mouth Daily., Disp: , Rfl:     citalopram (CeleXA) 20 MG tablet, TAKE 1 TABLET BY MOUTH DAILY, Disp: 30 tablet, Rfl: 3    lisinopril (PRINIVIL,ZESTRIL) 40 MG tablet, TAKE 1 TABLET BY MOUTH DAILY, Disp: 90 tablet, Rfl: 1    metoprolol succinate XL (TOPROL-XL) 25 MG 24 hr tablet, Take 1 tablet by mouth Daily., Disp: 90 tablet, Rfl: 3    rosuvastatin (CRESTOR) 10 MG tablet, TAKE 1 TABLET BY MOUTH DAILY, Disp: 30 tablet, Rfl: 3    traZODone (DESYREL) 150 MG tablet, TAKE ONE TABLET BY MOUTH EVERY NIGHT AT BEDTIME, Disp: 30 tablet, Rfl: 3    Xarelto 20 MG tablet, TAKE 1 TABLET BY MOUTH DAILY, Disp: 30 tablet, Rfl: 1  Allergies:  Lipitor [atorvastatin calcium]      Physical Exam  VITALS REVIEWED    General:      well developed,  in no acute distress.    Head:      normocephalic and atraumatic.    Eyes:      PERRL/EOM intact, conjunctiva and sclera clear with out nystagmus.    Neck:      no masses, thyromegaly,  trachea central with normal respiratory effort and PMI displaced laterally  Lungs:      Clear to auscultation bilaterally  Heart:       Regular rate and rhythm no murmur  Msk:      no deformity or scoliosis noted of thoracic or lumbar spine.    Pulses:      pulses normal in all 4 extremities.    Extremities:       No lower extremity edema  Neurologic:      no focal deficits.   alert oriented x3  Skin:      intact without lesions or rashes.    Psych:      alert and cooperative; normal mood and affect; normal attention span and concentration.      Result Review :               Pertinent cardiac workup    Event monitor for 13 days on 4/5/2023, showed paroxysmal atrial fibrillation, burden of 4%.    EKG 4/27/2023 sinus bradycardia at 47 bpm.  EKG 2/27/2023 sinus bradycardia at 45 bpm.  Heart cath 2/3/2022, no obstructive CAD.           ECG 12 Lead    Date/Time: 10/15/2024 10:53 AM  Performed by: Sukumar Brady MD    Authorized by: Sukumar Brady MD  Comparison: compared with previous ECG   Similar to previous ECG  Rhythm: sinus rhythm  Rate: normal  BPM: 70  Conduction: conduction normal  ST Segments: ST segments normal  QRS axis: normal  Other findings: non-specific ST-T wave changes              Assessment and Plan      Ty Bergeron is a 64-year-old male patient who has paroxysmal atrial fibrillation diagnosed in April 2023, sick sinus syndrome with bradycardia seen on several EKGs. Patient had A-fib ablation on 6/30/2023 and is here today for follow-up. Is been doing well, he has been in sinus rhythm.   Previously had been on amiodarone which was discontinued, currently on Toprol 25 mg once a day.  He is on both Xarelto and aspirin, I think we can stop the aspirin since he does not have significant CAD and is complaining  of skin bruising.  Otherwise he will continue to follow-up with Dr. Meza, I will see him on as-needed basis.    Diagnoses and all orders for this visit:    1. Paroxysmal atrial fibrillation (Primary)    2. Primary hypertension           Return in about 1 year (around 10/15/2025).  Patient was given instructions and counseling regarding his condition or for health maintenance advice. Please see specific information pulled into the AVS if appropriate.       Electronically signed by Sukumar Brady MD, 10/15/24, 10:57 AM EDT.

## 2024-10-21 DIAGNOSIS — E55.9 VITAMIN D DEFICIENCY: ICD-10-CM

## 2024-10-21 RX ORDER — ERGOCALCIFEROL 1.25 MG/1
50000 CAPSULE, LIQUID FILLED ORAL WEEKLY
Qty: 4 CAPSULE | Refills: 1 | Status: SHIPPED | OUTPATIENT
Start: 2024-10-21

## 2024-10-29 DIAGNOSIS — I48.0 PAROXYSMAL ATRIAL FIBRILLATION: Primary | ICD-10-CM

## 2024-10-29 RX ORDER — CITALOPRAM HYDROBROMIDE 20 MG/1
20 TABLET ORAL DAILY
Qty: 30 TABLET | Refills: 3 | Status: SHIPPED | OUTPATIENT
Start: 2024-10-29

## 2024-10-29 RX ORDER — METOPROLOL SUCCINATE 25 MG/1
25 TABLET, EXTENDED RELEASE ORAL DAILY
Qty: 90 TABLET | Refills: 3 | Status: SHIPPED | OUTPATIENT
Start: 2024-10-29

## 2024-10-29 NOTE — TELEPHONE ENCOUNTER
Rx Refill Note  Requested Prescriptions     Signed Prescriptions Disp Refills    metoprolol succinate XL (TOPROL-XL) 25 MG 24 hr tablet 90 tablet 3     Sig: Take 1 tablet by mouth Daily.     Authorizing Provider: TONYA NAILS     Ordering User: CORRIE ROGEL      Last office visit with prescribing clinician: 10/15/2024   Last telemedicine visit with prescribing clinician: Visit date not found   Next office visit with prescribing clinician: Visit date not found                         Would you like a call back once the refill request has been completed: [] Yes [] No    If the office needs to give you a call back, can they leave a voicemail: [] Yes [] No    Corrie Rogel MA  10/29/24, 11:28 EDT

## 2024-12-23 DIAGNOSIS — E55.9 VITAMIN D DEFICIENCY: ICD-10-CM

## 2024-12-23 RX ORDER — ERGOCALCIFEROL 1.25 MG/1
50000 CAPSULE, LIQUID FILLED ORAL WEEKLY
Qty: 4 CAPSULE | Refills: 1 | Status: SHIPPED | OUTPATIENT
Start: 2024-12-23

## 2025-01-10 ENCOUNTER — APPOINTMENT (OUTPATIENT)
Dept: CT IMAGING | Facility: HOSPITAL | Age: 65
End: 2025-01-10
Payer: COMMERCIAL

## 2025-01-10 ENCOUNTER — APPOINTMENT (OUTPATIENT)
Dept: GENERAL RADIOLOGY | Facility: HOSPITAL | Age: 65
End: 2025-01-10
Payer: COMMERCIAL

## 2025-01-10 ENCOUNTER — HOSPITAL ENCOUNTER (OUTPATIENT)
Facility: HOSPITAL | Age: 65
LOS: 1 days | Discharge: HOME | End: 2025-01-12
Attending: EMERGENCY MEDICINE | Admitting: FAMILY MEDICINE
Payer: COMMERCIAL

## 2025-01-10 DIAGNOSIS — K81.0 ACUTE CHOLECYSTITIS: Primary | ICD-10-CM

## 2025-01-10 LAB
ALBUMIN SERPL-MCNC: 3.8 G/DL (ref 3.5–5.2)
ALBUMIN/GLOB SERPL: 1.3 G/DL
ALP SERPL-CCNC: 75 U/L (ref 39–117)
ALT SERPL W P-5'-P-CCNC: 15 U/L (ref 1–41)
ANION GAP SERPL CALCULATED.3IONS-SCNC: 9.5 MMOL/L (ref 5–15)
AST SERPL-CCNC: 16 U/L (ref 1–40)
BASOPHILS # BLD AUTO: 0.07 10*3/MM3 (ref 0–0.2)
BASOPHILS NFR BLD AUTO: 0.5 % (ref 0–1.5)
BILIRUB SERPL-MCNC: 0.7 MG/DL (ref 0–1.2)
BUN SERPL-MCNC: 13 MG/DL (ref 8–23)
BUN/CREAT SERPL: 12.6 (ref 7–25)
CALCIUM SPEC-SCNC: 9.5 MG/DL (ref 8.6–10.5)
CHLORIDE SERPL-SCNC: 100 MMOL/L (ref 98–107)
CO2 SERPL-SCNC: 24.5 MMOL/L (ref 22–29)
CREAT SERPL-MCNC: 1.03 MG/DL (ref 0.76–1.27)
DEPRECATED RDW RBC AUTO: 49.6 FL (ref 37–54)
EGFRCR SERPLBLD CKD-EPI 2021: 81.6 ML/MIN/1.73
EOSINOPHIL # BLD AUTO: 0.07 10*3/MM3 (ref 0–0.4)
EOSINOPHIL NFR BLD AUTO: 0.5 % (ref 0.3–6.2)
ERYTHROCYTE [DISTWIDTH] IN BLOOD BY AUTOMATED COUNT: 15 % (ref 12.3–15.4)
GLOBULIN UR ELPH-MCNC: 2.9 GM/DL
GLUCOSE BLDC GLUCOMTR-MCNC: 111 MG/DL (ref 70–105)
GLUCOSE SERPL-MCNC: 133 MG/DL (ref 65–99)
HCT VFR BLD AUTO: 41.5 % (ref 37.5–51)
HGB BLD-MCNC: 14.2 G/DL (ref 13–17.7)
HOLD SPECIMEN: NORMAL
IMM GRANULOCYTES # BLD AUTO: 0.08 10*3/MM3 (ref 0–0.05)
IMM GRANULOCYTES NFR BLD AUTO: 0.5 % (ref 0–0.5)
LIPASE SERPL-CCNC: 15 U/L (ref 13–60)
LYMPHOCYTES # BLD AUTO: 0.85 10*3/MM3 (ref 0.7–3.1)
LYMPHOCYTES NFR BLD AUTO: 5.7 % (ref 19.6–45.3)
MCH RBC QN AUTO: 30.5 PG (ref 26.6–33)
MCHC RBC AUTO-ENTMCNC: 34.2 G/DL (ref 31.5–35.7)
MCV RBC AUTO: 89.1 FL (ref 79–97)
MONOCYTES # BLD AUTO: 1.69 10*3/MM3 (ref 0.1–0.9)
MONOCYTES NFR BLD AUTO: 11.2 % (ref 5–12)
NEUTROPHILS NFR BLD AUTO: 12.27 10*3/MM3 (ref 1.7–7)
NEUTROPHILS NFR BLD AUTO: 81.6 % (ref 42.7–76)
NRBC BLD AUTO-RTO: 0 /100 WBC (ref 0–0.2)
PLATELET # BLD AUTO: 170 10*3/MM3 (ref 140–450)
PMV BLD AUTO: 11.4 FL (ref 6–12)
POTASSIUM SERPL-SCNC: 4.6 MMOL/L (ref 3.5–5.2)
PROT SERPL-MCNC: 6.7 G/DL (ref 6–8.5)
RBC # BLD AUTO: 4.66 10*6/MM3 (ref 4.14–5.8)
SODIUM SERPL-SCNC: 134 MMOL/L (ref 136–145)
WBC NRBC COR # BLD AUTO: 15.03 10*3/MM3 (ref 3.4–10.8)

## 2025-01-10 PROCEDURE — 85025 COMPLETE CBC W/AUTO DIFF WBC: CPT | Performed by: EMERGENCY MEDICINE

## 2025-01-10 PROCEDURE — 25010000002 METRONIDAZOLE 500 MG/100ML SOLUTION: Performed by: EMERGENCY MEDICINE

## 2025-01-10 PROCEDURE — 25810000003 SODIUM CHLORIDE 0.9 % SOLUTION: Performed by: INTERNAL MEDICINE

## 2025-01-10 PROCEDURE — 25010000002 METRONIDAZOLE 500 MG/100ML SOLUTION: Performed by: INTERNAL MEDICINE

## 2025-01-10 PROCEDURE — G0378 HOSPITAL OBSERVATION PER HR: HCPCS

## 2025-01-10 PROCEDURE — 94799 UNLISTED PULMONARY SVC/PX: CPT

## 2025-01-10 PROCEDURE — 93005 ELECTROCARDIOGRAM TRACING: CPT | Performed by: INTERNAL MEDICINE

## 2025-01-10 PROCEDURE — 25010000002 ONDANSETRON PER 1 MG: Performed by: EMERGENCY MEDICINE

## 2025-01-10 PROCEDURE — 99285 EMERGENCY DEPT VISIT HI MDM: CPT

## 2025-01-10 PROCEDURE — 25010000002 CEFTRIAXONE PER 250 MG: Performed by: EMERGENCY MEDICINE

## 2025-01-10 PROCEDURE — 93005 ELECTROCARDIOGRAM TRACING: CPT | Performed by: EMERGENCY MEDICINE

## 2025-01-10 PROCEDURE — 83690 ASSAY OF LIPASE: CPT | Performed by: EMERGENCY MEDICINE

## 2025-01-10 PROCEDURE — S2900 ROBOTIC SURGICAL SYSTEM: HCPCS | Performed by: SURGERY

## 2025-01-10 PROCEDURE — 71045 X-RAY EXAM CHEST 1 VIEW: CPT

## 2025-01-10 PROCEDURE — 25010000002 HYDROMORPHONE 1 MG/ML SOLUTION: Performed by: INTERNAL MEDICINE

## 2025-01-10 PROCEDURE — 96376 TX/PRO/DX INJ SAME DRUG ADON: CPT

## 2025-01-10 PROCEDURE — 99204 OFFICE O/P NEW MOD 45 MIN: CPT | Performed by: SURGERY

## 2025-01-10 PROCEDURE — 80053 COMPREHEN METABOLIC PANEL: CPT | Performed by: EMERGENCY MEDICINE

## 2025-01-10 PROCEDURE — 25010000002 ONDANSETRON PER 1 MG: Performed by: INTERNAL MEDICINE

## 2025-01-10 PROCEDURE — 25010000002 HYDROMORPHONE 1 MG/ML SOLUTION: Performed by: EMERGENCY MEDICINE

## 2025-01-10 PROCEDURE — 96365 THER/PROPH/DIAG IV INF INIT: CPT

## 2025-01-10 PROCEDURE — 25510000001 IOPAMIDOL PER 1 ML: Performed by: EMERGENCY MEDICINE

## 2025-01-10 PROCEDURE — 36415 COLL VENOUS BLD VENIPUNCTURE: CPT

## 2025-01-10 PROCEDURE — 96375 TX/PRO/DX INJ NEW DRUG ADDON: CPT

## 2025-01-10 PROCEDURE — 96367 TX/PROPH/DG ADDL SEQ IV INF: CPT

## 2025-01-10 PROCEDURE — 96361 HYDRATE IV INFUSION ADD-ON: CPT

## 2025-01-10 PROCEDURE — 82948 REAGENT STRIP/BLOOD GLUCOSE: CPT

## 2025-01-10 PROCEDURE — 74177 CT ABD & PELVIS W/CONTRAST: CPT

## 2025-01-10 PROCEDURE — 96366 THER/PROPH/DIAG IV INF ADDON: CPT

## 2025-01-10 RX ORDER — ONDANSETRON 2 MG/ML
4 INJECTION INTRAMUSCULAR; INTRAVENOUS EVERY 6 HOURS PRN
Status: DISCONTINUED | OUTPATIENT
Start: 2025-01-10 | End: 2025-01-12 | Stop reason: HOSPADM

## 2025-01-10 RX ORDER — ACETAMINOPHEN 325 MG/1
650 TABLET ORAL EVERY 4 HOURS PRN
Status: DISCONTINUED | OUTPATIENT
Start: 2025-01-10 | End: 2025-01-12 | Stop reason: HOSPADM

## 2025-01-10 RX ORDER — METRONIDAZOLE 500 MG/100ML
500 INJECTION, SOLUTION INTRAVENOUS EVERY 8 HOURS
Status: DISCONTINUED | OUTPATIENT
Start: 2025-01-10 | End: 2025-01-12 | Stop reason: HOSPADM

## 2025-01-10 RX ORDER — ACETAMINOPHEN 650 MG/1
650 SUPPOSITORY RECTAL EVERY 4 HOURS PRN
Status: DISCONTINUED | OUTPATIENT
Start: 2025-01-10 | End: 2025-01-12 | Stop reason: HOSPADM

## 2025-01-10 RX ORDER — METRONIDAZOLE 500 MG/100ML
500 INJECTION, SOLUTION INTRAVENOUS ONCE
Status: COMPLETED | OUTPATIENT
Start: 2025-01-10 | End: 2025-01-10

## 2025-01-10 RX ORDER — ACETAMINOPHEN 160 MG/5ML
650 SOLUTION ORAL EVERY 4 HOURS PRN
Status: DISCONTINUED | OUTPATIENT
Start: 2025-01-10 | End: 2025-01-10

## 2025-01-10 RX ORDER — ALUMINA, MAGNESIA, AND SIMETHICONE 2400; 2400; 240 MG/30ML; MG/30ML; MG/30ML
15 SUSPENSION ORAL EVERY 6 HOURS PRN
Status: DISCONTINUED | OUTPATIENT
Start: 2025-01-10 | End: 2025-01-12 | Stop reason: HOSPADM

## 2025-01-10 RX ORDER — SODIUM CHLORIDE 0.9 % (FLUSH) 0.9 %
10 SYRINGE (ML) INJECTION EVERY 12 HOURS SCHEDULED
Status: DISCONTINUED | OUTPATIENT
Start: 2025-01-10 | End: 2025-01-12 | Stop reason: HOSPADM

## 2025-01-10 RX ORDER — IOPAMIDOL 755 MG/ML
100 INJECTION, SOLUTION INTRAVASCULAR
Status: COMPLETED | OUTPATIENT
Start: 2025-01-10 | End: 2025-01-10

## 2025-01-10 RX ORDER — SODIUM CHLORIDE 0.9 % (FLUSH) 0.9 %
10 SYRINGE (ML) INJECTION AS NEEDED
Status: DISCONTINUED | OUTPATIENT
Start: 2025-01-10 | End: 2025-01-12 | Stop reason: HOSPADM

## 2025-01-10 RX ORDER — INDOCYANINE GREEN AND WATER 25 MG
2.5 KIT INJECTION ONCE
Status: COMPLETED | OUTPATIENT
Start: 2025-01-11 | End: 2025-01-11

## 2025-01-10 RX ORDER — ONDANSETRON 2 MG/ML
4 INJECTION INTRAMUSCULAR; INTRAVENOUS ONCE
Status: COMPLETED | OUTPATIENT
Start: 2025-01-10 | End: 2025-01-10

## 2025-01-10 RX ORDER — SODIUM CHLORIDE 9 MG/ML
75 INJECTION, SOLUTION INTRAVENOUS CONTINUOUS
Status: DISPENSED | OUTPATIENT
Start: 2025-01-10 | End: 2025-01-11

## 2025-01-10 RX ORDER — NITROGLYCERIN 0.4 MG/1
0.4 TABLET SUBLINGUAL
Status: DISCONTINUED | OUTPATIENT
Start: 2025-01-10 | End: 2025-01-12 | Stop reason: HOSPADM

## 2025-01-10 RX ORDER — ONDANSETRON 4 MG/1
4 TABLET, ORALLY DISINTEGRATING ORAL EVERY 6 HOURS PRN
Status: DISCONTINUED | OUTPATIENT
Start: 2025-01-10 | End: 2025-01-12 | Stop reason: HOSPADM

## 2025-01-10 RX ORDER — SODIUM CHLORIDE 9 MG/ML
40 INJECTION, SOLUTION INTRAVENOUS AS NEEDED
Status: DISCONTINUED | OUTPATIENT
Start: 2025-01-10 | End: 2025-01-12 | Stop reason: HOSPADM

## 2025-01-10 RX ADMIN — HYDROMORPHONE HYDROCHLORIDE 1 MG: 1 INJECTION, SOLUTION INTRAMUSCULAR; INTRAVENOUS; SUBCUTANEOUS at 08:22

## 2025-01-10 RX ADMIN — ONDANSETRON 4 MG: 2 INJECTION INTRAMUSCULAR; INTRAVENOUS at 18:25

## 2025-01-10 RX ADMIN — CEFTRIAXONE SODIUM 1000 MG: 1 INJECTION, POWDER, FOR SOLUTION INTRAMUSCULAR; INTRAVENOUS at 09:53

## 2025-01-10 RX ADMIN — HYDROMORPHONE HYDROCHLORIDE 1 MG: 1 INJECTION, SOLUTION INTRAMUSCULAR; INTRAVENOUS; SUBCUTANEOUS at 12:54

## 2025-01-10 RX ADMIN — SODIUM CHLORIDE 75 ML/HR: 9 INJECTION, SOLUTION INTRAVENOUS at 12:00

## 2025-01-10 RX ADMIN — HYDROMORPHONE HYDROCHLORIDE 1 MG: 1 INJECTION, SOLUTION INTRAMUSCULAR; INTRAVENOUS; SUBCUTANEOUS at 22:08

## 2025-01-10 RX ADMIN — HYDROMORPHONE HYDROCHLORIDE 1 MG: 1 INJECTION, SOLUTION INTRAMUSCULAR; INTRAVENOUS; SUBCUTANEOUS at 18:24

## 2025-01-10 RX ADMIN — IOPAMIDOL 100 ML: 755 INJECTION, SOLUTION INTRAVENOUS at 09:06

## 2025-01-10 RX ADMIN — ONDANSETRON 4 MG: 2 INJECTION INTRAMUSCULAR; INTRAVENOUS at 08:22

## 2025-01-10 RX ADMIN — METRONIDAZOLE 500 MG: 500 INJECTION, SOLUTION INTRAVENOUS at 18:25

## 2025-01-10 RX ADMIN — ACETAMINOPHEN 650 MG: 325 TABLET, FILM COATED ORAL at 23:05

## 2025-01-10 RX ADMIN — METRONIDAZOLE 500 MG: 500 INJECTION, SOLUTION INTRAVENOUS at 10:29

## 2025-01-10 RX ADMIN — Medication 10 ML: at 20:59

## 2025-01-10 NOTE — CONSULTS
General Surgery Consult Note      Name: Ty Bergeron ADMIT: 1/10/2025   : 1960  PCP: Richard Darnell APRN    MRN: [id number] LOS: 1 days   AGE/SEX: 64 y.o. male  ROOM: [address] [place] [address]      Patient Care Team:  Richard Darnell APRN as PCP - General (Family Medicine)  Sukumar Brady MD as Consulting Physician (Cardiology)  Chief Complaint   Patient presents with    Chest Pain       HPI  64 y.o. male who presents with a several day history of epigastric and right upper quadrant pain.  Associated with nausea and vomiting.  Does not radiate to his back.  Subjective fevers.  Imaging consistent with acute cholecystitis.  Patient does report he has had prior similar episodes in the past however he was more epigastric pain and generally resolves on its own.  He presented to the emergency department because this time pain persisted and was worse than usual.  Of note the patient is usually on Xarelto but states he has not taken it for the last 2 to 3 days due to not feeling well.    Past Medical History:   Diagnosis Date    Anxiety     Cholelithiasis     Depression     History of overdose years ago    DVT, bilateral lower limbs     GERD (gastroesophageal reflux disease)     Hyperlipidemia     Hypertension     Obesity     CASS (obstructive sleep apnea)     not treating    Pituitary microadenoma     Testosterone deficiency     Thrombocytopenia      Past Surgical History:   Procedure Laterality Date    CARDIAC CATHETERIZATION N/A 2022    Procedure: Left Heart Cath;  Surgeon: Tevin Meza MD;  Location: [facility] INVASIVE LOCATION;  Service: Cardiology;  Laterality: N/A;    CARDIAC CATHETERIZATION N/A 2022    Procedure: Coronary angiography;  Surgeon: Tevin Meza MD;  Location: [facility] INVASIVE LOCATION;  Service: Cardiology;  Laterality: N/A;    CARDIAC CATHETERIZATION N/A 2022    Procedure: Left ventriculography;  Surgeon: Tevin Meza  MD;  Location: [facility] CATH INVASIVE LOCATION;  Service: Cardiology;  Laterality: N/A;    CARDIAC ELECTROPHYSIOLOGY PROCEDURE N/A 06/30/2023    Procedure: Ablation atrial fibrillation, RF, rep sent email;  Surgeon: Sukumar Brady MD;  Location: [facility] CATH INVASIVE LOCATION;  Service: Cardiovascular;  Laterality: N/A;    COLONOSCOPY      2011- due 5 years     CYSTOSCOPY, URETEROSCOPY, RETROGRADE PYELOGRAM, STENT INSERTION Right 08/10/2023    Procedure: CYSTOSCOPY, RIGHT URETEROSCOPY, RETROGRADE PYELOGRAM HOLMIUM LASER, STONE EXTRACTION AND  STENT INSERTION (NO STRING);  Surgeon: Ty Tucker MD;  Location: [facility] MAIN OR;  Service: Urology;  Laterality: Right;    HERNIA REPAIR      INGUINAL HERNIA REPAIR      JOINT REPLACEMENT      REPLACEMENT TOTAL KNEE Left 07/12/2023    SPLENECTOMY      TOTAL KNEE ARTHROPLASTY Bilateral     TOTAL SHOULDER REPLACEMENT  02/12/2021    VASECTOMY       Family History   Problem Relation Age of Onset    Diabetes Father     Liver disease Father         transplant    Heart disease Mother     Diabetes Brother     Heart disease Brother        Social History     Tobacco Use    Smoking status: Never     Passive exposure: Never    Smokeless tobacco: Never   Vaping Use    Vaping status: Never Used   Substance Use Topics    Alcohol use: Yes     Alcohol/week: 20.0 standard drinks of alcohol     Types: 20 Cans of beer per week    Drug use: No     Medications Prior to Admission   Medication Sig Dispense Refill Last Dose/Taking    alfuzosin (UROXATRAL) 10 MG 24 hr tablet Take 1 tablet by mouth Daily. 90 tablet 1 Past Week    cetirizine (zyrTEC) 10 MG tablet Take 1 tablet by mouth Daily.   Past Week    citalopram (CeleXA) 20 MG tablet Take 1 tablet by mouth Daily. 30 tablet 3 Past Week    lisinopril (PRINIVIL,ZESTRIL) 40 MG tablet TAKE 1 TABLET BY MOUTH DAILY 90 tablet 1 Past Week    metoprolol succinate XL (TOPROL-XL) 25 MG 24 hr tablet Take 1 tablet by mouth Daily. 90 tablet 3 Past Week     rosuvastatin (CRESTOR) 10 MG tablet TAKE 1 TABLET BY MOUTH DAILY 30 tablet 3 Past Week    traZODone (DESYREL) 150 MG tablet TAKE ONE TABLET BY MOUTH EVERY NIGHT AT BEDTIME 30 tablet 3 Past Week    Xarelto 20 MG tablet TAKE 1 TABLET BY MOUTH DAILY 30 tablet 1 Past Week     [START ON 1/11/2025] cefTRIAXone, 1,000 mg, Intravenous, Daily  metroNIDAZOLE, 500 mg, Intravenous, Q8H  sodium chloride, 10 mL, Intravenous, Q12H      sodium chloride, 75 mL/hr, Last Rate: 75 mL/hr (01/10/25 1200)        acetaminophen **OR** [DISCONTINUED] acetaminophen **OR** acetaminophen    aluminum-magnesium hydroxide-simethicone    Calcium Replacement - Follow Nurse / BPA Driven Protocol    HYDROmorphone    Magnesium Standard Dose Replacement - Follow Nurse / BPA Driven Protocol    melatonin    nitroglycerin    ondansetron ODT **OR** ondansetron    Phosphorus Replacement - Follow Nurse / BPA Driven Protocol    Potassium Replacement - Follow Nurse / BPA Driven Protocol    [COMPLETED] Insert Peripheral IV **AND** sodium chloride    sodium chloride    sodium chloride  Lipitor [atorvastatin calcium]    Review of Systems:   As noted above in HPI    Vitals:  Temp:  [97.7 °F (36.5 °C)] 97.7 °F (36.5 °C)  Heart Rate:  [74-97] 78  Resp:  [18] 18  BP: (104-129)/(64-84) 108/64     Physical Exam:   No acute distress, alert  Nonlabored respirations  Abdomen soft, obese, mildly tender to palpation in the right upper quadrant with negative Farrell sign  Extremities warm well-perfused with no gross deformities    Labs:  Results from last 7 days   Lab Units 01/10/25  0751   WBC 10*3/mm3 15.03*   HEMOGLOBIN g/dL 14.2   HEMATOCRIT % 41.5   PLATELETS 10*3/mm3 170     Results from last 7 days   Lab Units 01/10/25  0751   SODIUM mmol/L 134*   POTASSIUM mmol/L 4.6   CHLORIDE mmol/L 100   CO2 mmol/L 24.5   BUN mg/dL 13   CREATININE mg/dL 1.03   CALCIUM mg/dL 9.5   BILIRUBIN mg/dL 0.7   ALK PHOS U/L 75   ALT (SGPT) U/L 15   AST (SGOT) U/L 16   GLUCOSE mg/dL 133*      Imaging:  CT abdomen/pelvis 1/10/2025  IMPRESSION:  1. Acute cholecystitis with stone in the proximal cystic duct  2. Left nephrolithiasis    Assessment and Plan:  64 y.o. male with epigastric and right upper quadrant pain and imaging showing acute cholecystitis.    - Cholecystectomy recommended given symptoms and imaging findings as well as leukocytosis  - The surgery along with associate risk on benefits, terms were discussed with the patient; risk discussed include but are not limited to bleeding, infection, hernia, conversion to open, injury to the common bile duct requiring further surgical intervention  - The patient expressed understanding of everything discussed and would like to proceed with cholecystectomy  - Will plan for robotic assisted laparoscopic cholecystectomy on 1/11/2025    This note was created using Dragon Voice Recognition software.    Alice Mayberry MD  01/10/25  14:44 EST

## 2025-01-10 NOTE — PLAN OF CARE
Problem: Adult Inpatient Plan of Care  Goal: Plan of Care Review  Outcome: Progressing  Goal: Patient-Specific Goal (Individualized)  Outcome: Progressing  Goal: Absence of Hospital-Acquired Illness or Injury  Outcome: Progressing  Goal: Optimal Comfort and Wellbeing  Outcome: Progressing  Goal: Readiness for Transition of Care  Outcome: Progressing     Problem: Pain Acute  Goal: Optimal Pain Control and Function  Outcome: Progressing     Problem: Nausea and Vomiting  Goal: Nausea and Vomiting Relief  Outcome: Progressing   Goal Outcome Evaluation:

## 2025-01-10 NOTE — ED PROVIDER NOTES
Subjective   History of Present Illness  64-year-old male states he has had some right upper quadrant pain on and off over the last month generally after eating.  States on Wednesday he ate pizza and about 2 hours later developed right upper quadrant pain some nausea vomiting and felt feverish.  He states the pain has persisted over the last couple of days.  He reports a few loose stools.  He reports no melena or hematochezia or hematemesis.  Review of Systems    Past Medical History:   Diagnosis Date    Anxiety     Cholelithiasis     Depression     History of overdose years ago    DVT, bilateral lower limbs     GERD (gastroesophageal reflux disease)     Hyperlipidemia     Hypertension     Obesity     CASS (obstructive sleep apnea)     not treating    Pituitary microadenoma     Testosterone deficiency     Thrombocytopenia        Allergies   Allergen Reactions    Lipitor [Atorvastatin Calcium] Myalgia       Past Surgical History:   Procedure Laterality Date    CARDIAC CATHETERIZATION N/A 02/03/2022    Procedure: Left Heart Cath;  Surgeon: Tevin Meza MD;  Location: [facility] CATH INVASIVE LOCATION;  Service: Cardiology;  Laterality: N/A;    CARDIAC CATHETERIZATION N/A 02/03/2022    Procedure: Coronary angiography;  Surgeon: Tevin Meza MD;  Location: [facility] CATH INVASIVE LOCATION;  Service: Cardiology;  Laterality: N/A;    CARDIAC CATHETERIZATION N/A 02/03/2022    Procedure: Left ventriculography;  Surgeon: Tevin Meza MD;  Location: [facility] CATH INVASIVE LOCATION;  Service: Cardiology;  Laterality: N/A;    CARDIAC ELECTROPHYSIOLOGY PROCEDURE N/A 06/30/2023    Procedure: Ablation atrial fibrillation, RF, rep sent email;  Surgeon: Sukumar Brady MD;  Location: [facility] CATH INVASIVE LOCATION;  Service: Cardiovascular;  Laterality: N/A;    COLONOSCOPY      2011- due 5 years     CYSTOSCOPY, URETEROSCOPY, RETROGRADE PYELOGRAM, STENT INSERTION Right 08/10/2023    Procedure: CYSTOSCOPY,  RIGHT URETEROSCOPY, RETROGRADE PYELOGRAM HOLMIUM LASER, STONE EXTRACTION AND  STENT INSERTION (NO STRING);  Surgeon: Ty Tucker MD;  Location: [facility] MAIN OR;  Service: Urology;  Laterality: Right;    HERNIA REPAIR      INGUINAL HERNIA REPAIR      JOINT REPLACEMENT      REPLACEMENT TOTAL KNEE Left 07/12/2023    SPLENECTOMY      TOTAL KNEE ARTHROPLASTY Bilateral     TOTAL SHOULDER REPLACEMENT  02/12/2021    VASECTOMY         Family History   Problem Relation Age of Onset    Diabetes Father     Liver disease Father         transplant    Heart disease Mother     Diabetes Brother     Heart disease Brother        Social History     Socioeconomic History    Marital status:    Tobacco Use    Smoking status: Never     Passive exposure: Never    Smokeless tobacco: Never   Vaping Use    Vaping status: Never Used   Substance and Sexual Activity    Alcohol use: Yes     Alcohol/week: 20.0 standard drinks of alcohol     Types: 20 Cans of beer per week    Drug use: No    Sexual activity: Not Currently     Partners: Female     Birth control/protection: Condom, Vasectomy     Prior to Admission medications    Medication Sig Start Date End Date Taking? Authorizing Provider   alfuzosin (UROXATRAL) 10 MG 24 hr tablet Take 1 tablet by mouth Daily. 10/14/24   Richard Darnell APRN   cetirizine (zyrTEC) 10 MG tablet Take 1 tablet by mouth Daily.    Provider, MD Gracie   citalopram (CeleXA) 20 MG tablet Take 1 tablet by mouth Daily. 10/29/24   Richard Darnell APRN   lisinopril (PRINIVIL,ZESTRIL) 40 MG tablet TAKE 1 TABLET BY MOUTH DAILY 8/17/23   Richard Darnell APRN   metoprolol succinate XL (TOPROL-XL) 25 MG 24 hr tablet Take 1 tablet by mouth Daily. 10/29/24   ChemchirianSukumar MD   rosuvastatin (CRESTOR) 10 MG tablet TAKE 1 TABLET BY MOUTH DAILY 4/22/24   Richard Darnell APRN   traZODone (DESYREL) 150 MG tablet TAKE ONE TABLET BY MOUTH EVERY NIGHT AT BEDTIME 3/14/24   Richard Darnell,  "VIKTORIYA   vitamin D (ERGOCALCIFEROL) 1.25 MG (75411 UT) capsule capsule TAKE 1 CAPSULE BY MOUTH ONCE WEEKLY 12/23/24   Richard Darnell APRN   Xarelto 20 MG tablet TAKE 1 TABLET BY MOUTH DAILY 4/22/24   Fabi Leyva APRN   metoprolol tartrate (LOPRESSOR) 25 MG tablet Take 1 tablet by mouth 2 (Two) Times a Day. 4/7/23 4/27/23  Fabi Leyva APRN     /65   Pulse 80   Temp 97.7 °F (36.5 °C) (Oral)   Resp 18   Ht 177.8 cm (70\")   Wt (!) 138 kg (304 lb 3.8 oz)   SpO2 97%   BMI 43.65 kg/m²         Objective   Physical Exam  General: Obese male, well-appearing, no acute distress, alert and appropriate  Eyes: sclera nonicteric  HEENT: Mucous membranes moist, no mucosal swelling  Neck: Supple, no nuchal rigidity,   Respirations: Respirations nonlabored, equal breath sounds bilaterally, clear lungs  Heart regular rate and rhythm, no murmurs rubs or gallops,   Abdomen soft, mildly tender palpation right upper quadrant, no rebound or guarding, nondistended, no hepatosplenomegaly, no hernia, no mass, normal bowel sounds, no CVA tenderness  Extremities no clubbing cyanosis or edema, calves are symmetric and nontender  Neuro cranial nerves grossly intact, no focal limb deficits  Psych oriented, pleasant affect  Skin no rash, brisk cap refill  Procedures           ED Course  ED Course as of 01/10/25 1021   Fri Rayshawn 10, 2025   1000 Findings discussed with Dr. Mayberry general surgery is agreeable plan of admission with surgical consultation [SH]      ED Course User Index  [SH] Dar Trinh MD      Results for orders placed or performed during the hospital encounter of 01/10/25   ECG 12 Lead Chest Pain    Collection Time: 01/10/25  7:48 AM   Result Value Ref Range    QT Interval 364 ms    QTC Interval 416 ms   Comprehensive Metabolic Panel    Collection Time: 01/10/25  7:51 AM    Specimen: Blood   Result Value Ref Range    Glucose 133 (H) 65 - 99 mg/dL    BUN 13 8 - 23 mg/dL    Creatinine 1.03 0.76 - 1.27 mg/dL "    Sodium 134 (L) 136 - 145 mmol/L    Potassium 4.6 3.5 - 5.2 mmol/L    Chloride 100 98 - 107 mmol/L    CO2 24.5 22.0 - 29.0 mmol/L    Calcium 9.5 8.6 - 10.5 mg/dL    Total Protein 6.7 6.0 - 8.5 g/dL    Albumin 3.8 3.5 - 5.2 g/dL    ALT (SGPT) 15 1 - 41 U/L    AST (SGOT) 16 1 - 40 U/L    Alkaline Phosphatase 75 39 - 117 U/L    Total Bilirubin 0.7 0.0 - 1.2 mg/dL    Globulin 2.9 gm/dL    A/G Ratio 1.3 g/dL    BUN/Creatinine Ratio 12.6 7.0 - 25.0    Anion Gap 9.5 5.0 - 15.0 mmol/L    eGFR 81.6 >60.0 mL/min/1.73   Lipase    Collection Time: 01/10/25  7:51 AM    Specimen: Blood   Result Value Ref Range    Lipase 15 13 - 60 U/L   CBC Auto Differential    Collection Time: 01/10/25  7:51 AM    Specimen: Blood   Result Value Ref Range    WBC 15.03 (H) 3.40 - 10.80 10*3/mm3    RBC 4.66 4.14 - 5.80 10*6/mm3    Hemoglobin 14.2 13.0 - 17.7 g/dL    Hematocrit 41.5 37.5 - 51.0 %    MCV 89.1 79.0 - 97.0 fL    MCH 30.5 26.6 - 33.0 pg    MCHC 34.2 31.5 - 35.7 g/dL    RDW 15.0 12.3 - 15.4 %    RDW-SD 49.6 37.0 - 54.0 fl    MPV 11.4 6.0 - 12.0 fL    Platelets 170 140 - 450 10*3/mm3    Neutrophil % 81.6 (H) 42.7 - 76.0 %    Lymphocyte % 5.7 (L) 19.6 - 45.3 %    Monocyte % 11.2 5.0 - 12.0 %    Eosinophil % 0.5 0.3 - 6.2 %    Basophil % 0.5 0.0 - 1.5 %    Immature Grans % 0.5 0.0 - 0.5 %    Neutrophils, Absolute 12.27 (H) 1.70 - 7.00 10*3/mm3    Lymphocytes, Absolute 0.85 0.70 - 3.10 10*3/mm3    Monocytes, Absolute 1.69 (H) 0.10 - 0.90 10*3/mm3    Eosinophils, Absolute 0.07 0.00 - 0.40 10*3/mm3    Basophils, Absolute 0.07 0.00 - 0.20 10*3/mm3    Immature Grans, Absolute 0.08 (H) 0.00 - 0.05 10*3/mm3    nRBC 0.0 0.0 - 0.2 /100 WBC   Gold Top - SST    Collection Time: 01/10/25  7:51 AM   Result Value Ref Range    Extra Tube Hold for add-ons.      CT Abdomen Pelvis With Contrast    Result Date: 1/10/2025  1. Acute cholecystitis with stone in the proximal cystic duct 2. Left nephrolithiasis Electronically Signed: Cuong Stapleton  1/10/2025  9:16 AM EST  Workstation ID: [id number]                                                    Medical Decision Making  Differential diagnosis including cholecystitis, pancreatitis, acute coronary syndrome,    Patient has a benign abdominal examination no signs of peritonitis or acute abdomen.  He was found to have acute cholecystitis.  EKG shows no ischemic abnormalities.  He was ordered Rocephin and Flagyl.  Surgery was consulted.  Case discussed with  hospitalist service is agreeable plan of admission.  Patient advised of findings and also agreeable plan of admission with surgical consultation.    Problems Addressed:  Acute cholecystitis: complicated acute illness or injury    Amount and/or Complexity of Data Reviewed  Labs: ordered. Decision-making details documented in ED Course.     Details: Lipase normal, CBC shows some leukocytosis, comprehensive metabolic panel shows normal LFTs and bilirubin  Radiology: ordered and independent interpretation performed.     Details: My independent interpretation of CT abdomen pelvis distended gallbladder with cystic stone  ECG/medicine tests: ordered and independent interpretation performed.     Details: My EKG interpretation sinus rhythm rate of 78, no acute ST or T wave abnormality    Risk  Prescription drug management.  Decision regarding hospitalization.        Final diagnoses:   Acute cholecystitis       ED Disposition  ED Disposition       ED Disposition   Decision to Admit    Condition   --    Comment   Level of Care: Med/Surg [1]   Admitting Physician: GABRIELLE BABB [[id number]]   Attending Physician: GABRIELLE BABB [[id number]]                 No follow-up provider specified.       Medication List      No changes were made to your prescriptions during this visit.            Dar Trinh MD  01/10/25 1029

## 2025-01-10 NOTE — ED NOTES
Pt c/o chest pain that started on Wednesday.  Pt sts it started out as epigastric pain then moved to the right side of his chest.

## 2025-01-10 NOTE — H&P
History and Physical   Ty Bergeron : 1960 MRN:[id number] LOS:1     Reason for admission: Acute cholecystitis     Assessment / Plan     # RHC pain secondary to acute cholecystitis  -pt presented with epigastric pain and RHC pain intermittent worsened with time  -Found to be acute cholecystitis stone in the proximal cystic duct   -AST ALT ALP bilirubin normal for now   -on IV rocephin and flagyl  -plan for OR marilee   -pain med NPO midnight     #Anxiety/depression  -Resume home medication once verified by pharmacy and clinically appropriate    #Hypertension  #Hyperlipidemia  -Resume home medication once verified by pharmacy and clinically appropriate      Code Status (Patient has no pulse and is not breathing): CPR (Attempt to Resuscitate)  Medical Interventions (Patient has pulse or is breathing): Full Support       Nutrition: Diet: Liquid; Clear Liquid; Fluid Consistency: Thin (IDDSI 0)     DVT Prophylaxis: Active VTE Prophylaxis  Mechanical:        Start        01/10/25 1112  Maintain Sequential Compression Device  Continuous                          Select Pharmacologic VTE Prophylaxis if Desired & Appropriate      History of Present illness     A 64 y.o. old male patient with PMH of anxiety depression GERD hypertension hyperlipidemia presents to the hospital with complaints of abdominal pain in epigastrium and right upper quadrant intermittently for past few weeks.  Patient developed nausea vomiting and subjective fever at home.  In the ED patient is found to be acute cholecystitis with stone in the proximal cystic duct.  Labs with leukocytosis of 15 AST ALT alkaline phos total bilirubin normal.  Creatinine at 1.  Patient is started on IV Rocephin and Flagyl for now.  Plan for surgery on 2025.    Admitted for acute cholecystitis management.    Subjective / Review of systems     Review of Systems   Epigastric pain and RHC pain     Past Medical/Surgical/Social/Family History & Allergies      Past Medical History:   Diagnosis Date    Anxiety     Cholelithiasis     Depression     History of overdose years ago    DVT, bilateral lower limbs     GERD (gastroesophageal reflux disease)     Hyperlipidemia     Hypertension     Obesity     CASS (obstructive sleep apnea)     not treating    Pituitary microadenoma     Testosterone deficiency     Thrombocytopenia       Past Surgical History:   Procedure Laterality Date    CARDIAC CATHETERIZATION N/A 02/03/2022    Procedure: Left Heart Cath;  Surgeon: Tevin Meza MD;  Location: [facility] CATH INVASIVE LOCATION;  Service: Cardiology;  Laterality: N/A;    CARDIAC CATHETERIZATION N/A 02/03/2022    Procedure: Coronary angiography;  Surgeon: Tevin Meza MD;  Location: [facility] CATH INVASIVE LOCATION;  Service: Cardiology;  Laterality: N/A;    CARDIAC CATHETERIZATION N/A 02/03/2022    Procedure: Left ventriculography;  Surgeon: Tevin Meza MD;  Location: [facility] CATH INVASIVE LOCATION;  Service: Cardiology;  Laterality: N/A;    CARDIAC ELECTROPHYSIOLOGY PROCEDURE N/A 06/30/2023    Procedure: Ablation atrial fibrillation, RF, rep sent email;  Surgeon: Sukumar Brady MD;  Location: [facility] CATH INVASIVE LOCATION;  Service: Cardiovascular;  Laterality: N/A;    COLONOSCOPY      2011- due 5 years     CYSTOSCOPY, URETEROSCOPY, RETROGRADE PYELOGRAM, STENT INSERTION Right 08/10/2023    Procedure: CYSTOSCOPY, RIGHT URETEROSCOPY, RETROGRADE PYELOGRAM HOLMIUM LASER, STONE EXTRACTION AND  STENT INSERTION (NO STRING);  Surgeon: Ty Tucker MD;  Location: [facility] OR;  Service: Urology;  Laterality: Right;    HERNIA REPAIR      INGUINAL HERNIA REPAIR      JOINT REPLACEMENT      REPLACEMENT TOTAL KNEE Left 07/12/2023    SPLENECTOMY      TOTAL KNEE ARTHROPLASTY Bilateral     TOTAL SHOULDER REPLACEMENT  02/12/2021    VASECTOMY        Social History     Socioeconomic History    Marital status:    Tobacco Use    Smoking status: Never      Passive exposure: Never    Smokeless tobacco: Never   Vaping Use    Vaping status: Never Used   Substance and Sexual Activity    Alcohol use: Yes     Alcohol/week: 20.0 standard drinks of alcohol     Types: 20 Cans of beer per week    Drug use: No    Sexual activity: Not Currently     Partners: Female     Birth control/protection: Condom, Vasectomy      Family History   Problem Relation Age of Onset    Diabetes Father     Liver disease Father         transplant    Heart disease Mother     Diabetes Brother     Heart disease Brother       Allergies   Allergen Reactions    Lipitor [Atorvastatin Calcium] Myalgia        Home Medications     Prior to Admission medications    Medication Sig Start Date End Date Taking? Authorizing Provider   alfuzosin (UROXATRAL) 10 MG 24 hr tablet Take 1 tablet by mouth Daily. 10/14/24  Yes Richard Darnell APRN   cetirizine (zyrTEC) 10 MG tablet Take 1 tablet by mouth Daily.   Yes Provider, MD Gracie   citalopram (CeleXA) 20 MG tablet Take 1 tablet by mouth Daily. 10/29/24  Yes Richard Darnell APRN   lisinopril (PRINIVIL,ZESTRIL) 40 MG tablet TAKE 1 TABLET BY MOUTH DAILY 8/17/23  Yes Richard Darnell APRN   metoprolol succinate XL (TOPROL-XL) 25 MG 24 hr tablet Take 1 tablet by mouth Daily. 10/29/24  Yes ChemchirianSukumar MD   rosuvastatin (CRESTOR) 10 MG tablet TAKE 1 TABLET BY MOUTH DAILY 4/22/24  Yes Richard Darnell APRN   traZODone (DESYREL) 150 MG tablet TAKE ONE TABLET BY MOUTH EVERY NIGHT AT BEDTIME 3/14/24  Yes Richard Darnell APRN   Xarelto 20 MG tablet TAKE 1 TABLET BY MOUTH DAILY 4/22/24  Yes Fabi Leyva APRN   metoprolol tartrate (LOPRESSOR) 25 MG tablet Take 1 tablet by mouth 2 (Two) Times a Day. 4/7/23 4/27/23  Fabi Leyva APRN   vitamin D (ERGOCALCIFEROL) 1.25 MG (83620 UT) capsule capsule TAKE 1 CAPSULE BY MOUTH ONCE WEEKLY 12/23/24 1/10/25  Richard Darnell APRN      Objective / Physical Exam   Vital signs:  Temp: 97.7 °F  (36.5 °C)  BP: 108/64  Heart Rate: 78  Resp: 18  SpO2: 90 %  Weight: (!) 138 kg (304 lb 3.8 oz)    Admission Weight: Weight: (!) 138 kg (304 lb 3.8 oz)    Physical Exam   Physical Exam  HENT:      Head: Normocephalic and atraumatic.      Nose: Nose normal.   Eyes:      Extraocular Movements: Extraocular movements intact.      Conjunctivae/sclerae: Conjunctivae normal.      Pupils: Pupils are equal, round, and reactive to light.   Cardiovascular:      Rate and Rhythm: Normal       Pulses: Normal pulses.      Heart sounds: Normal heart sounds.   Pulmonary:      Effort: normal      Breath sounds: normal   Abdominal:      RHC and epigastric tenderness  Musculoskeletal:         General: Normal range of motion.      Cervical back: Normal range of motion and neck supple.   Skin:     General: Skin is dry.   Neurological:      General: No focal deficit present.      Mental Status: alert.   Psychiatric:         Mood and Affect: Mood normal.        Labs     Results from last 7 days   Lab Units 01/10/25  0751   WBC 10*3/mm3 15.03*   HEMATOCRIT % 41.5   PLATELETS 10*3/mm3 170      Results from last 7 days   Lab Units 01/10/25  0751   SODIUM mmol/L 134*   POTASSIUM mmol/L 4.6   CHLORIDE mmol/L 100   CO2 mmol/L 24.5   BUN mg/dL 13   CREATININE mg/dL 1.03        Current Medications   Scheduled Meds:[START ON 1/11/2025] cefTRIAXone, 1,000 mg, Intravenous, Daily  metroNIDAZOLE, 500 mg, Intravenous, Q8H  sodium chloride, 10 mL, Intravenous, Q12H         Continuous Infusions:sodium chloride, 75 mL/hr, Last Rate: 75 mL/hr (01/10/25 1200)         Danyell David MD  [facility]   01/10/25   14:36 EST

## 2025-01-10 NOTE — ED NOTES
Nursing report ED to floor  Ty Bergeron  64 y.o.  male    HPI:   Chief Complaint   Patient presents with    Chest Pain       Admitting doctor:   Danyell David MD    Admitting diagnosis:   The encounter diagnosis was Acute cholecystitis.    Code status:   Current Code Status       Date Active Code Status Order ID Comments User Context       Prior            Allergies:   Lipitor [atorvastatin calcium]    Isolation:  No active isolations     Fall Risk:  Fall Risk Assessment was completed, and patient is at low risk for falls.   Predictive Model Details         12 (Low) Factor Value    Calculated 1/10/2025 10:40 Age 64    Risk of Fall Model Active Peripheral IV Present     Imaging order in this encounter Present     Number of Distinct Medication Classes administered 6     Respiratory Rate 18     Magnesium not on file     Diastolic BP 65     Tariq Scale not on file     Chloride 100 mmol/L     Albumin 3.8 g/dL     Clinically Relevant Sex Not Female     Total Bilirubin 0.7 mg/dL     Number of administrations of Analgesic Narcotics 1     Potassium 4.6 mmol/L     Days after Admission 0.135     Calcium 9.5 mg/dL     Creatinine 1.03 mg/dL     ALT 15 U/L         Weight:       01/10/25  0726   Weight: (!) 138 kg (304 lb 3.8 oz)       Intake and Output    Intake/Output Summary (Last 24 hours) at 1/10/2025 1042  Last data filed at 1/10/2025 1023  Gross per 24 hour   Intake 100 ml   Output --   Net 100 ml       Diet:        Most recent vitals:   Vitals:    01/10/25 0900 01/10/25 0915 01/10/25 0930 01/10/25 0945   BP: 113/65 118/70 123/78 104/65   Pulse: 77 80 80 80   Resp:       Temp:       TempSrc:       SpO2: 94% 95% 95% 97%   Weight:       Height:           Active LDAs/IV Access:   Lines, Drains & Airways       Active LDAs       Name Placement date Placement time Site Days    Peripheral IV 01/10/25 0822 Left Antecubital 01/10/25  0822  Antecubital  less than 1                    Skin Condition:   Skin Assessments (last day)        Date/Time Skin WDL    01/10/25 08:25:50 [facility]             Labs (abnormal labs have a star):   Labs Reviewed   COMPREHENSIVE METABOLIC PANEL - Abnormal; Notable for the following components:       Result Value    Glucose 133 (*)     Sodium 134 (*)     All other components within normal limits    Narrative:     GFR Categories in Chronic Kidney Disease (CKD)      GFR Category          GFR (mL/min/1.73)    Interpretation  G1                     90 or greater         Normal or high (1)  G2                      60-89                Mild decrease (1)  G3a                   45-59                Mild to moderate decrease  G3b                   30-44                Moderate to severe decrease  G4                    15-29                Severe decrease  G5                    14 or less           Kidney failure          (1)In the absence of evidence of kidney disease, neither GFR category G1 or G2 fulfill the criteria for CKD.    eGFR calculation 2021 CKD-EPI creatinine equation, which does not include race as a factor   CBC WITH AUTO DIFFERENTIAL - Abnormal; Notable for the following components:    WBC 15.03 (*)     Neutrophil % 81.6 (*)     Lymphocyte % 5.7 (*)     Neutrophils, Absolute 12.27 (*)     Monocytes, Absolute 1.69 (*)     Immature Grans, Absolute 0.08 (*)     All other components within normal limits   LIPASE - Normal   CBC AND DIFFERENTIAL    Narrative:     The following orders were created for panel order CBC & Differential.  Procedure                               Abnormality         Status                     ---------                               -----------         ------                     CBC Auto Differential[[id number]]        Abnormal            Final result                 Please view results for these tests on the individual orders.   EXTRA TUBES    Narrative:     The following orders were created for panel order Extra Tubes.  Procedure                               Abnormality         Status                      ---------                               -----------         ------                     [facility][[id number]]                                   Final result                 Please view results for these tests on the individual orders.   GOLD TOP - SST       LOC: Person, Place, and Time    Telemetry:  Med/Surg    Cardiac Monitoring Ordered: yes    EKG:   ECG 12 Lead Chest Pain   Preliminary Result   HEART RATE=78  bpm   RR Zxvhkgxg=140  ms   OH Gjfmxkga=554  ms   P Horizontal Axis=5  deg   P Front Axis=39  deg   QRSD Interval=95  ms   QT Qjpowhll=583  ms   CRuD=273  ms   QRS Axis=-25  deg   T Wave Axis=16  deg   - ABNORMAL ECG -   Sinus rhythm   Inferior infarct, old   Date and Time of Study:2025-01-10 07:48:12          Medications Given in the ED:   Medications   sodium chloride 0.9 % flush 10 mL (has no administration in time range)   metroNIDAZOLE (FLAGYL) IVPB 500 mg (500 mg Intravenous New Bag 1/10/25 1029)   ondansetron (ZOFRAN) injection 4 mg (4 mg Intravenous Given 1/10/25 0822)   HYDROmorphone (DILAUDID) injection 1 mg (1 mg Intravenous Given 1/10/25 0822)   iopamidol (ISOVUE-370) 76 % injection 100 mL (100 mL Intravenous Given 1/10/25 0906)   cefTRIAXone (ROCEPHIN) 1,000 mg in sodium chloride 0.9 % 100 mL MBP (0 mg Intravenous Stopped 1/10/25 1023)       Imaging results:  CT Abdomen Pelvis With Contrast    Result Date: 1/10/2025  1. Acute cholecystitis with stone in the proximal cystic duct 2. Left nephrolithiasis Electronically Signed: Cuong Stapleton  1/10/2025 9:16 AM EST  Workstation ID: [id number]     Social issues:   Social History     Socioeconomic History    Marital status:    Tobacco Use    Smoking status: Never     Passive exposure: Never    Smokeless tobacco: Never   Vaping Use    Vaping status: Never Used   Substance and Sexual Activity    Alcohol use: Yes     Alcohol/week: 20.0 standard drinks of alcohol     Types: 20 Cans of beer per week    Drug use: No    Sexual  activity: Not Currently     Partners: Female     Birth control/protection: Condom, Vasectomy       NIH Stroke Scale:  Interval: (not recorded)  1a. Level of Consciousness: (not recorded)  1b. LOC Questions: (not recorded)  1c. LOC Commands: (not recorded)  2. Best Gaze: (not recorded)  3. Visual: (not recorded)  4. Facial Palsy: (not recorded)  5a. Motor Arm, Left: (not recorded)  5b. Motor Arm, Right: (not recorded)  6a. Motor Leg, Left: (not recorded)  6b. Motor Leg, Right: (not recorded)  7. Limb Ataxia: (not recorded)  8. Sensory: (not recorded)  9. Best Language: (not recorded)  10. Dysarthria: (not recorded)  11. Extinction and Inattention (formerly Neglect): (not recorded)    Total (NIH Stroke Scale): (not recorded)     Additional notable assessment information:     Nursing report ED to floor:  Aleksandr Duvall LPN   01/10/25 10:42 EST

## 2025-01-10 NOTE — Clinical Note
Level of Care: Telemetry [5]   Diagnosis: Acute cholecystitis [575.0.ICD-9-CM]   Certification: I Certify That Inpatient Hospital Services Are Medically Necessary For Greater Than 2 Midnights

## 2025-01-11 ENCOUNTER — ANESTHESIA EVENT (OUTPATIENT)
Dept: PERIOP | Facility: HOSPITAL | Age: 65
End: 2025-01-11
Payer: COMMERCIAL

## 2025-01-11 ENCOUNTER — APPOINTMENT (OUTPATIENT)
Dept: GENERAL RADIOLOGY | Facility: HOSPITAL | Age: 65
End: 2025-01-11
Payer: COMMERCIAL

## 2025-01-11 ENCOUNTER — ANESTHESIA (OUTPATIENT)
Dept: PERIOP | Facility: HOSPITAL | Age: 65
End: 2025-01-11
Payer: COMMERCIAL

## 2025-01-11 PROBLEM — K81.0 ACUTE CHOLECYSTITIS: Status: RESOLVED | Noted: 2025-01-10 | Resolved: 2025-01-11

## 2025-01-11 LAB
ALBUMIN SERPL-MCNC: 3.3 G/DL (ref 3.5–5.2)
ALBUMIN SERPL-MCNC: 3.3 G/DL (ref 3.5–5.2)
ALBUMIN/GLOB SERPL: 1 G/DL
ALBUMIN/GLOB SERPL: 1.1 G/DL
ALP SERPL-CCNC: 83 U/L (ref 39–117)
ALP SERPL-CCNC: 94 U/L (ref 39–117)
ALT SERPL W P-5'-P-CCNC: 19 U/L (ref 1–41)
ALT SERPL W P-5'-P-CCNC: 26 U/L (ref 1–41)
ANION GAP SERPL CALCULATED.3IONS-SCNC: 11.5 MMOL/L (ref 5–15)
ANION GAP SERPL CALCULATED.3IONS-SCNC: 11.5 MMOL/L (ref 5–15)
AST SERPL-CCNC: 29 U/L (ref 1–40)
AST SERPL-CCNC: 57 U/L (ref 1–40)
BASOPHILS # BLD AUTO: 0.05 10*3/MM3 (ref 0–0.2)
BASOPHILS NFR BLD AUTO: 0.3 % (ref 0–1.5)
BILIRUB SERPL-MCNC: 0.9 MG/DL (ref 0–1.2)
BILIRUB SERPL-MCNC: 1 MG/DL (ref 0–1.2)
BUN SERPL-MCNC: 14 MG/DL (ref 8–23)
BUN SERPL-MCNC: 19 MG/DL (ref 8–23)
BUN/CREAT SERPL: 12.3 (ref 7–25)
BUN/CREAT SERPL: 9.7 (ref 7–25)
CA-I SERPL ISE-MCNC: 1 MMOL/L (ref 1.15–1.3)
CALCIUM SPEC-SCNC: 8.2 MG/DL (ref 8.6–10.5)
CALCIUM SPEC-SCNC: 8.9 MG/DL (ref 8.6–10.5)
CHLORIDE SERPL-SCNC: 102 MMOL/L (ref 98–107)
CHLORIDE SERPL-SCNC: 105 MMOL/L (ref 98–107)
CO2 SERPL-SCNC: 16.5 MMOL/L (ref 22–29)
CO2 SERPL-SCNC: 19.5 MMOL/L (ref 22–29)
CREAT SERPL-MCNC: 1.44 MG/DL (ref 0.76–1.27)
CREAT SERPL-MCNC: 1.55 MG/DL (ref 0.76–1.27)
DEPRECATED RDW RBC AUTO: 51.7 FL (ref 37–54)
EGFRCR SERPLBLD CKD-EPI 2021: 50 ML/MIN/1.73
EGFRCR SERPLBLD CKD-EPI 2021: 54.6 ML/MIN/1.73
EOSINOPHIL # BLD AUTO: 0.07 10*3/MM3 (ref 0–0.4)
EOSINOPHIL NFR BLD AUTO: 0.4 % (ref 0.3–6.2)
ERYTHROCYTE [DISTWIDTH] IN BLOOD BY AUTOMATED COUNT: 15.3 % (ref 12.3–15.4)
GLOBULIN UR ELPH-MCNC: 2.9 GM/DL
GLOBULIN UR ELPH-MCNC: 3.2 GM/DL
GLUCOSE SERPL-MCNC: 108 MG/DL (ref 65–99)
GLUCOSE SERPL-MCNC: 93 MG/DL (ref 65–99)
HCT VFR BLD AUTO: 40.7 % (ref 37.5–51)
HGB BLD-MCNC: 13.3 G/DL (ref 13–17.7)
IMM GRANULOCYTES # BLD AUTO: 0.13 10*3/MM3 (ref 0–0.05)
IMM GRANULOCYTES NFR BLD AUTO: 0.8 % (ref 0–0.5)
LYMPHOCYTES # BLD AUTO: 0.41 10*3/MM3 (ref 0.7–3.1)
LYMPHOCYTES NFR BLD AUTO: 2.5 % (ref 19.6–45.3)
MAGNESIUM SERPL-MCNC: 1.8 MG/DL (ref 1.6–2.4)
MAGNESIUM SERPL-MCNC: 2 MG/DL (ref 1.6–2.4)
MCH RBC QN AUTO: 30 PG (ref 26.6–33)
MCHC RBC AUTO-ENTMCNC: 32.7 G/DL (ref 31.5–35.7)
MCV RBC AUTO: 91.7 FL (ref 79–97)
MONOCYTES # BLD AUTO: 0.79 10*3/MM3 (ref 0.1–0.9)
MONOCYTES NFR BLD AUTO: 4.7 % (ref 5–12)
NEUTROPHILS NFR BLD AUTO: 15.23 10*3/MM3 (ref 1.7–7)
NEUTROPHILS NFR BLD AUTO: 91.3 % (ref 42.7–76)
NRBC BLD AUTO-RTO: 0 /100 WBC (ref 0–0.2)
PHOSPHATE SERPL-MCNC: 1.7 MG/DL (ref 2.5–4.5)
PHOSPHATE SERPL-MCNC: 3.2 MG/DL (ref 2.5–4.5)
PHOSPHATE SERPL-MCNC: 3.2 MG/DL (ref 2.5–4.5)
PLATELET # BLD AUTO: 118 10*3/MM3 (ref 140–450)
PMV BLD AUTO: 12.2 FL (ref 6–12)
POTASSIUM SERPL-SCNC: 4.5 MMOL/L (ref 3.5–5.2)
POTASSIUM SERPL-SCNC: 5.4 MMOL/L (ref 3.5–5.2)
PROT SERPL-MCNC: 6.2 G/DL (ref 6–8.5)
PROT SERPL-MCNC: 6.5 G/DL (ref 6–8.5)
QT INTERVAL: 364 MS
QTC INTERVAL: 416 MS
RBC # BLD AUTO: 4.44 10*6/MM3 (ref 4.14–5.8)
SODIUM SERPL-SCNC: 133 MMOL/L (ref 136–145)
SODIUM SERPL-SCNC: 133 MMOL/L (ref 136–145)
WBC NRBC COR # BLD AUTO: 16.68 10*3/MM3 (ref 3.4–10.8)

## 2025-01-11 PROCEDURE — 84100 ASSAY OF PHOSPHORUS: CPT | Performed by: FAMILY MEDICINE

## 2025-01-11 PROCEDURE — 25010000002 DEXAMETHASONE PER 1 MG: Performed by: NURSE ANESTHETIST, CERTIFIED REGISTERED

## 2025-01-11 PROCEDURE — 84100 ASSAY OF PHOSPHORUS: CPT | Performed by: INTERNAL MEDICINE

## 2025-01-11 PROCEDURE — 25010000002 BUPIVACAINE 0.25 % SOLUTION: Performed by: SURGERY

## 2025-01-11 PROCEDURE — 25810000003 SODIUM CHLORIDE 0.9 % SOLUTION: Performed by: FAMILY MEDICINE

## 2025-01-11 PROCEDURE — P9041 ALBUMIN (HUMAN),5%, 50ML: HCPCS | Performed by: NURSE ANESTHETIST, CERTIFIED REGISTERED

## 2025-01-11 PROCEDURE — 82330 ASSAY OF CALCIUM: CPT | Performed by: SURGERY

## 2025-01-11 PROCEDURE — 25010000002 INDOCYANINE GREEN 25 MG RECONSTITUTED SOLUTION: Performed by: SURGERY

## 2025-01-11 PROCEDURE — 25010000002 MORPHINE PER 10 MG: Performed by: FAMILY MEDICINE

## 2025-01-11 PROCEDURE — 25010000002 METRONIDAZOLE 500 MG/100ML SOLUTION: Performed by: INTERNAL MEDICINE

## 2025-01-11 PROCEDURE — 82330 ASSAY OF CALCIUM: CPT | Performed by: FAMILY MEDICINE

## 2025-01-11 PROCEDURE — 47562 LAPAROSCOPIC CHOLECYSTECTOMY: CPT | Performed by: SURGERY

## 2025-01-11 PROCEDURE — 25010000002 HEPARIN (PORCINE) PER 1000 UNITS: Performed by: SURGERY

## 2025-01-11 PROCEDURE — 80053 COMPREHEN METABOLIC PANEL: CPT | Performed by: INTERNAL MEDICINE

## 2025-01-11 PROCEDURE — 85025 COMPLETE CBC W/AUTO DIFF WBC: CPT | Performed by: SURGERY

## 2025-01-11 PROCEDURE — 83735 ASSAY OF MAGNESIUM: CPT | Performed by: FAMILY MEDICINE

## 2025-01-11 PROCEDURE — 83735 ASSAY OF MAGNESIUM: CPT | Performed by: SURGERY

## 2025-01-11 PROCEDURE — G0378 HOSPITAL OBSERVATION PER HR: HCPCS

## 2025-01-11 PROCEDURE — 25010000002 MIDAZOLAM PER 1 MG: Performed by: NURSE ANESTHETIST, CERTIFIED REGISTERED

## 2025-01-11 PROCEDURE — 84100 ASSAY OF PHOSPHORUS: CPT | Performed by: SURGERY

## 2025-01-11 PROCEDURE — 25010000002 ONDANSETRON PER 1 MG: Performed by: NURSE ANESTHETIST, CERTIFIED REGISTERED

## 2025-01-11 PROCEDURE — 25810000003 LACTATED RINGERS PER 1000 ML: Performed by: NURSE ANESTHETIST, CERTIFIED REGISTERED

## 2025-01-11 PROCEDURE — 25010000002 PROPOFOL 200 MG/20ML EMULSION: Performed by: NURSE ANESTHETIST, CERTIFIED REGISTERED

## 2025-01-11 PROCEDURE — 25010000002 LIDOCAINE PF 2% 2 % SOLUTION: Performed by: NURSE ANESTHETIST, CERTIFIED REGISTERED

## 2025-01-11 PROCEDURE — 88304 TISSUE EXAM BY PATHOLOGIST: CPT | Performed by: SURGERY

## 2025-01-11 PROCEDURE — 96366 THER/PROPH/DIAG IV INF ADDON: CPT

## 2025-01-11 PROCEDURE — 96376 TX/PRO/DX INJ SAME DRUG ADON: CPT

## 2025-01-11 PROCEDURE — 80053 COMPREHEN METABOLIC PANEL: CPT | Performed by: FAMILY MEDICINE

## 2025-01-11 PROCEDURE — 25010000002 SUGAMMADEX 200 MG/2ML SOLUTION: Performed by: NURSE ANESTHETIST, CERTIFIED REGISTERED

## 2025-01-11 PROCEDURE — 80053 COMPREHEN METABOLIC PANEL: CPT | Performed by: SURGERY

## 2025-01-11 PROCEDURE — 25010000002 ALBUMIN HUMAN 5% PER 50 ML: Performed by: NURSE ANESTHETIST, CERTIFIED REGISTERED

## 2025-01-11 PROCEDURE — 25010000002 CEFTRIAXONE PER 250 MG: Performed by: INTERNAL MEDICINE

## 2025-01-11 PROCEDURE — 25010000002 VASOPRESSIN 20 UNIT/ML SOLUTION: Performed by: NURSE ANESTHETIST, CERTIFIED REGISTERED

## 2025-01-11 PROCEDURE — 25810000003 SODIUM CHLORIDE 0.9 % SOLUTION: Performed by: INTERNAL MEDICINE

## 2025-01-11 PROCEDURE — 25010000002 FENTANYL CITRATE (PF) 100 MCG/2ML SOLUTION: Performed by: NURSE ANESTHETIST, CERTIFIED REGISTERED

## 2025-01-11 PROCEDURE — 25010000002 SUCCINYLCHOLINE PER 20 MG: Performed by: NURSE ANESTHETIST, CERTIFIED REGISTERED

## 2025-01-11 PROCEDURE — 25010000002 HYDROMORPHONE 1 MG/ML SOLUTION: Performed by: INTERNAL MEDICINE

## 2025-01-11 PROCEDURE — 83735 ASSAY OF MAGNESIUM: CPT | Performed by: INTERNAL MEDICINE

## 2025-01-11 PROCEDURE — 85025 COMPLETE CBC W/AUTO DIFF WBC: CPT | Performed by: INTERNAL MEDICINE

## 2025-01-11 PROCEDURE — 25010000002 CEFTRIAXONE PER 250 MG: Performed by: SURGERY

## 2025-01-11 PROCEDURE — 25010000002 METRONIDAZOLE 500 MG/100ML SOLUTION: Performed by: SURGERY

## 2025-01-11 PROCEDURE — 25810000003 SODIUM CHLORIDE 0.9 % SOLUTION: Performed by: NURSE ANESTHETIST, CERTIFIED REGISTERED

## 2025-01-11 DEVICE — LARGE LIGATION CLIPS 6 CLIPS/CART
Type: IMPLANTABLE DEVICE | Site: ABDOMEN | Status: FUNCTIONAL
Brand: VAS-Q-CLIP

## 2025-01-11 RX ORDER — ONDANSETRON 2 MG/ML
INJECTION INTRAMUSCULAR; INTRAVENOUS AS NEEDED
Status: DISCONTINUED | OUTPATIENT
Start: 2025-01-11 | End: 2025-01-11 | Stop reason: SURG

## 2025-01-11 RX ORDER — ROCURONIUM BROMIDE 10 MG/ML
INJECTION, SOLUTION INTRAVENOUS AS NEEDED
Status: DISCONTINUED | OUTPATIENT
Start: 2025-01-11 | End: 2025-01-11 | Stop reason: SURG

## 2025-01-11 RX ORDER — PROPOFOL 10 MG/ML
INJECTION, EMULSION INTRAVENOUS AS NEEDED
Status: DISCONTINUED | OUTPATIENT
Start: 2025-01-11 | End: 2025-01-11 | Stop reason: SURG

## 2025-01-11 RX ORDER — SODIUM CHLORIDE 9 MG/ML
75 INJECTION, SOLUTION INTRAVENOUS CONTINUOUS
Status: DISCONTINUED | OUTPATIENT
Start: 2025-01-11 | End: 2025-01-12

## 2025-01-11 RX ORDER — SODIUM CHLORIDE, SODIUM LACTATE, POTASSIUM CHLORIDE, CALCIUM CHLORIDE 600; 310; 30; 20 MG/100ML; MG/100ML; MG/100ML; MG/100ML
INJECTION, SOLUTION INTRAVENOUS CONTINUOUS PRN
Status: DISCONTINUED | OUTPATIENT
Start: 2025-01-11 | End: 2025-01-11 | Stop reason: SURG

## 2025-01-11 RX ORDER — HYDROCODONE BITARTRATE AND ACETAMINOPHEN 5; 325 MG/1; MG/1
1 TABLET ORAL EVERY 6 HOURS PRN
Status: DISCONTINUED | OUTPATIENT
Start: 2025-01-11 | End: 2025-01-12 | Stop reason: HOSPADM

## 2025-01-11 RX ORDER — HEPARIN SODIUM 5000 [USP'U]/ML
5000 INJECTION, SOLUTION INTRAVENOUS; SUBCUTANEOUS EVERY 8 HOURS SCHEDULED
Status: DISCONTINUED | OUTPATIENT
Start: 2025-01-11 | End: 2025-01-12 | Stop reason: HOSPADM

## 2025-01-11 RX ORDER — EPHEDRINE SULFATE 5 MG/ML
5 INJECTION INTRAVENOUS ONCE AS NEEDED
Status: DISCONTINUED | OUTPATIENT
Start: 2025-01-11 | End: 2025-01-11 | Stop reason: HOSPADM

## 2025-01-11 RX ORDER — SODIUM CHLORIDE 9 MG/ML
INJECTION, SOLUTION INTRAVENOUS CONTINUOUS PRN
Status: DISCONTINUED | OUTPATIENT
Start: 2025-01-11 | End: 2025-01-11 | Stop reason: SURG

## 2025-01-11 RX ORDER — OXYCODONE HYDROCHLORIDE 5 MG/1
5 TABLET ORAL ONCE AS NEEDED
Status: DISCONTINUED | OUTPATIENT
Start: 2025-01-11 | End: 2025-01-11 | Stop reason: HOSPADM

## 2025-01-11 RX ORDER — DIPHENHYDRAMINE HYDROCHLORIDE 50 MG/ML
12.5 INJECTION INTRAMUSCULAR; INTRAVENOUS ONCE AS NEEDED
Status: DISCONTINUED | OUTPATIENT
Start: 2025-01-11 | End: 2025-01-11 | Stop reason: HOSPADM

## 2025-01-11 RX ORDER — IPRATROPIUM BROMIDE AND ALBUTEROL SULFATE 2.5; .5 MG/3ML; MG/3ML
3 SOLUTION RESPIRATORY (INHALATION) ONCE AS NEEDED
Status: DISCONTINUED | OUTPATIENT
Start: 2025-01-11 | End: 2025-01-11 | Stop reason: HOSPADM

## 2025-01-11 RX ORDER — LABETALOL HYDROCHLORIDE 5 MG/ML
5 INJECTION, SOLUTION INTRAVENOUS
Status: DISCONTINUED | OUTPATIENT
Start: 2025-01-11 | End: 2025-01-11 | Stop reason: HOSPADM

## 2025-01-11 RX ORDER — ONDANSETRON 2 MG/ML
4 INJECTION INTRAMUSCULAR; INTRAVENOUS ONCE AS NEEDED
Status: DISCONTINUED | OUTPATIENT
Start: 2025-01-11 | End: 2025-01-11 | Stop reason: HOSPADM

## 2025-01-11 RX ORDER — MIDAZOLAM HYDROCHLORIDE 1 MG/ML
INJECTION, SOLUTION INTRAMUSCULAR; INTRAVENOUS AS NEEDED
Status: DISCONTINUED | OUTPATIENT
Start: 2025-01-11 | End: 2025-01-11 | Stop reason: SURG

## 2025-01-11 RX ORDER — HYDROCODONE BITARTRATE AND ACETAMINOPHEN 10; 325 MG/1; MG/1
1 TABLET ORAL EVERY 6 HOURS PRN
Status: DISCONTINUED | OUTPATIENT
Start: 2025-01-11 | End: 2025-01-12 | Stop reason: HOSPADM

## 2025-01-11 RX ORDER — DEXAMETHASONE SODIUM PHOSPHATE 4 MG/ML
INJECTION, SOLUTION INTRA-ARTICULAR; INTRALESIONAL; INTRAMUSCULAR; INTRAVENOUS; SOFT TISSUE AS NEEDED
Status: DISCONTINUED | OUTPATIENT
Start: 2025-01-11 | End: 2025-01-11 | Stop reason: SURG

## 2025-01-11 RX ORDER — ALBUMIN HUMAN 50 G/1000ML
SOLUTION INTRAVENOUS CONTINUOUS PRN
Status: DISCONTINUED | OUTPATIENT
Start: 2025-01-11 | End: 2025-01-11 | Stop reason: SURG

## 2025-01-11 RX ORDER — HYDRALAZINE HYDROCHLORIDE 20 MG/ML
5 INJECTION INTRAMUSCULAR; INTRAVENOUS
Status: DISCONTINUED | OUTPATIENT
Start: 2025-01-11 | End: 2025-01-11 | Stop reason: HOSPADM

## 2025-01-11 RX ORDER — OXYCODONE HYDROCHLORIDE 5 MG/1
10 TABLET ORAL EVERY 4 HOURS PRN
Status: DISCONTINUED | OUTPATIENT
Start: 2025-01-11 | End: 2025-01-11 | Stop reason: HOSPADM

## 2025-01-11 RX ORDER — FENTANYL CITRATE 50 UG/ML
INJECTION, SOLUTION INTRAMUSCULAR; INTRAVENOUS AS NEEDED
Status: DISCONTINUED | OUTPATIENT
Start: 2025-01-11 | End: 2025-01-11 | Stop reason: SURG

## 2025-01-11 RX ORDER — EPHEDRINE SULFATE 5 MG/ML
INJECTION INTRAVENOUS AS NEEDED
Status: DISCONTINUED | OUTPATIENT
Start: 2025-01-11 | End: 2025-01-11 | Stop reason: SURG

## 2025-01-11 RX ORDER — SUCCINYLCHOLINE CHLORIDE 20 MG/ML
INJECTION INTRAMUSCULAR; INTRAVENOUS AS NEEDED
Status: DISCONTINUED | OUTPATIENT
Start: 2025-01-11 | End: 2025-01-11 | Stop reason: SURG

## 2025-01-11 RX ORDER — VASOPRESSIN 20 [USP'U]/ML
INJECTION, SOLUTION INTRAVENOUS AS NEEDED
Status: DISCONTINUED | OUTPATIENT
Start: 2025-01-11 | End: 2025-01-11 | Stop reason: SURG

## 2025-01-11 RX ORDER — BUPIVACAINE HYDROCHLORIDE 2.5 MG/ML
INJECTION, SOLUTION INFILTRATION; PERINEURAL AS NEEDED
Status: DISCONTINUED | OUTPATIENT
Start: 2025-01-11 | End: 2025-01-11 | Stop reason: HOSPADM

## 2025-01-11 RX ORDER — LIDOCAINE HYDROCHLORIDE 20 MG/ML
INJECTION, SOLUTION EPIDURAL; INFILTRATION; INTRACAUDAL; PERINEURAL AS NEEDED
Status: DISCONTINUED | OUTPATIENT
Start: 2025-01-11 | End: 2025-01-11 | Stop reason: SURG

## 2025-01-11 RX ORDER — PHENYLEPHRINE HCL IN 0.9% NACL 1 MG/10 ML
SYRINGE (ML) INTRAVENOUS AS NEEDED
Status: DISCONTINUED | OUTPATIENT
Start: 2025-01-11 | End: 2025-01-11 | Stop reason: SURG

## 2025-01-11 RX ORDER — NALOXONE HCL 0.4 MG/ML
0.4 VIAL (ML) INJECTION AS NEEDED
Status: DISCONTINUED | OUTPATIENT
Start: 2025-01-11 | End: 2025-01-11 | Stop reason: HOSPADM

## 2025-01-11 RX ORDER — DIPHENHYDRAMINE HYDROCHLORIDE 50 MG/ML
12.5 INJECTION INTRAMUSCULAR; INTRAVENOUS
Status: DISCONTINUED | OUTPATIENT
Start: 2025-01-11 | End: 2025-01-11 | Stop reason: HOSPADM

## 2025-01-11 RX ADMIN — VASOPRESSIN 2 UNITS: 20 INJECTION, SOLUTION INTRAMUSCULAR; SUBCUTANEOUS at 09:48

## 2025-01-11 RX ADMIN — MORPHINE SULFATE 4 MG: 4 INJECTION, SOLUTION INTRAMUSCULAR; INTRAVENOUS at 19:48

## 2025-01-11 RX ADMIN — DEXMEDETOMIDINE HYDROCHLORIDE 2 MCG: 100 INJECTION, SOLUTION INTRAVENOUS at 10:04

## 2025-01-11 RX ADMIN — METRONIDAZOLE 500 MG: 500 INJECTION, SOLUTION INTRAVENOUS at 18:03

## 2025-01-11 RX ADMIN — HYDROMORPHONE HYDROCHLORIDE 1 MG: 1 INJECTION, SOLUTION INTRAMUSCULAR; INTRAVENOUS; SUBCUTANEOUS at 05:10

## 2025-01-11 RX ADMIN — CEFTRIAXONE SODIUM 1000 MG: 1 INJECTION, POWDER, FOR SOLUTION INTRAMUSCULAR; INTRAVENOUS at 09:05

## 2025-01-11 RX ADMIN — DEXAMETHASONE SODIUM PHOSPHATE 8 MG: 4 INJECTION, SOLUTION INTRAMUSCULAR; INTRAVENOUS at 09:54

## 2025-01-11 RX ADMIN — DEXMEDETOMIDINE HYDROCHLORIDE 2 MCG: 100 INJECTION, SOLUTION INTRAVENOUS at 11:17

## 2025-01-11 RX ADMIN — Medication 100 MCG: at 10:34

## 2025-01-11 RX ADMIN — Medication 150 MCG: at 09:44

## 2025-01-11 RX ADMIN — ROCURONIUM BROMIDE 40 MG: 10 INJECTION, SOLUTION INTRAVENOUS at 09:52

## 2025-01-11 RX ADMIN — SODIUM CHLORIDE: 9 INJECTION, SOLUTION INTRAVENOUS at 09:30

## 2025-01-11 RX ADMIN — METRONIDAZOLE 500 MG: 500 INJECTION, SOLUTION INTRAVENOUS at 13:34

## 2025-01-11 RX ADMIN — ALBUMIN (HUMAN): 12.5 INJECTION, SOLUTION INTRAVENOUS at 09:48

## 2025-01-11 RX ADMIN — FENTANYL CITRATE 50 MCG: 50 INJECTION, SOLUTION INTRAMUSCULAR; INTRAVENOUS at 09:38

## 2025-01-11 RX ADMIN — Medication 10 ML: at 08:26

## 2025-01-11 RX ADMIN — SUGAMMADEX 300 MG: 100 INJECTION, SOLUTION INTRAVENOUS at 11:24

## 2025-01-11 RX ADMIN — VASOPRESSIN 1 UNITS: 20 INJECTION, SOLUTION INTRAMUSCULAR; SUBCUTANEOUS at 10:34

## 2025-01-11 RX ADMIN — MORPHINE SULFATE 4 MG: 4 INJECTION, SOLUTION INTRAMUSCULAR; INTRAVENOUS at 15:41

## 2025-01-11 RX ADMIN — ROCURONIUM BROMIDE 10 MG: 10 INJECTION, SOLUTION INTRAVENOUS at 10:30

## 2025-01-11 RX ADMIN — VASOPRESSIN 1 UNITS: 20 INJECTION, SOLUTION INTRAMUSCULAR; SUBCUTANEOUS at 10:40

## 2025-01-11 RX ADMIN — HYDROCODONE BITARTRATE AND ACETAMINOPHEN 1 TABLET: 10; 325 TABLET ORAL at 18:02

## 2025-01-11 RX ADMIN — FENTANYL CITRATE 50 MCG: 50 INJECTION, SOLUTION INTRAMUSCULAR; INTRAVENOUS at 10:00

## 2025-01-11 RX ADMIN — HEPARIN SODIUM 5000 UNITS: 5000 INJECTION INTRAVENOUS; SUBCUTANEOUS at 23:02

## 2025-01-11 RX ADMIN — SODIUM PHOSPHATE, MONOBASIC, MONOHYDRATE AND SODIUM PHOSPHATE, DIBASIC, ANHYDROUS 15 MMOL: 142; 276 INJECTION, SOLUTION INTRAVENOUS at 05:29

## 2025-01-11 RX ADMIN — PROPOFOL 40 MG: 10 INJECTION, EMULSION INTRAVENOUS at 09:38

## 2025-01-11 RX ADMIN — PROPOFOL 160 MG: 10 INJECTION, EMULSION INTRAVENOUS at 09:34

## 2025-01-11 RX ADMIN — INDOCYANINE GREEN AND WATER 2.5 MG: KIT at 08:22

## 2025-01-11 RX ADMIN — DEXMEDETOMIDINE HYDROCHLORIDE 4 MCG: 100 INJECTION, SOLUTION INTRAVENOUS at 09:59

## 2025-01-11 RX ADMIN — EPHEDRINE SULFATE 10 MG: 5 INJECTION INTRAVENOUS at 09:51

## 2025-01-11 RX ADMIN — Medication 10 ML: at 19:49

## 2025-01-11 RX ADMIN — DEXMEDETOMIDINE HYDROCHLORIDE 4 MCG: 100 INJECTION, SOLUTION INTRAVENOUS at 09:55

## 2025-01-11 RX ADMIN — METRONIDAZOLE 500 MG: 500 INJECTION, SOLUTION INTRAVENOUS at 03:19

## 2025-01-11 RX ADMIN — Medication 150 MCG: at 09:46

## 2025-01-11 RX ADMIN — LIDOCAINE HYDROCHLORIDE 80 MG: 20 INJECTION, SOLUTION EPIDURAL; INFILTRATION; INTRACAUDAL; PERINEURAL at 09:34

## 2025-01-11 RX ADMIN — ONDANSETRON 4 MG: 2 INJECTION INTRAMUSCULAR; INTRAVENOUS at 11:23

## 2025-01-11 RX ADMIN — SODIUM CHLORIDE 75 ML/HR: 9 INJECTION, SOLUTION INTRAVENOUS at 08:23

## 2025-01-11 RX ADMIN — ROCURONIUM BROMIDE 5 MG: 10 INJECTION, SOLUTION INTRAVENOUS at 09:34

## 2025-01-11 RX ADMIN — SUCCINYLCHOLINE CHLORIDE 160 MG: 20 INJECTION, SOLUTION INTRAMUSCULAR; INTRAVENOUS at 09:34

## 2025-01-11 RX ADMIN — DEXMEDETOMIDINE HYDROCHLORIDE 4 MCG: 100 INJECTION, SOLUTION INTRAVENOUS at 11:25

## 2025-01-11 RX ADMIN — SODIUM CHLORIDE, SODIUM LACTATE, POTASSIUM CHLORIDE, AND CALCIUM CHLORIDE: .6; .31; .03; .02 INJECTION, SOLUTION INTRAVENOUS at 10:32

## 2025-01-11 RX ADMIN — MIDAZOLAM 2 MG: 1 INJECTION INTRAMUSCULAR; INTRAVENOUS at 09:31

## 2025-01-11 RX ADMIN — Medication 100 MCG: at 10:40

## 2025-01-11 RX ADMIN — DEXMEDETOMIDINE HYDROCHLORIDE 4 MCG: 100 INJECTION, SOLUTION INTRAVENOUS at 11:21

## 2025-01-11 NOTE — PLAN OF CARE
Problem: Adult Inpatient Plan of Care  Goal: Plan of Care Review  Outcome: Not Progressing  Flowsheets (Taken 1/11/2025 0413)  Progress: no change  Plan of Care Reviewed With: patient  Goal: Patient-Specific Goal (Individualized)  Outcome: Not Progressing  Goal: Absence of Hospital-Acquired Illness or Injury  Outcome: Not Progressing  Intervention: Identify and Manage Fall Risk  Recent Flowsheet Documentation  Taken 1/11/2025 0222 by Caro Ross, RN  Safety Promotion/Fall Prevention:   assistive device/personal items within reach   clutter free environment maintained   fall prevention program maintained   nonskid shoes/slippers when out of bed   room organization consistent   safety round/check completed  Intervention: Prevent Infection  Recent Flowsheet Documentation  Taken 1/11/2025 0222 by Caro Ross, RN  Infection Prevention:   environmental surveillance performed   hand hygiene promoted   rest/sleep promoted   single patient room provided  Goal: Optimal Comfort and Wellbeing  Outcome: Not Progressing  Goal: Readiness for Transition of Care  Outcome: Not Progressing   Goal Outcome Evaluation:  Plan of Care Reviewed With: patient        Progress: no change     Alert and oriented x 4. Able to verbalize needs and wants. Continues to receive IV Flagyl and Rocephin for intraabdominal infection, no s/s of adverse/allergic reaction noted. Currently NPO for gall bladder removal, consent not obtained at this time. Currently in bed, eyes closed rise and fall of chest observed. Call bell in reach.

## 2025-01-11 NOTE — ANESTHESIA PREPROCEDURE EVALUATION
Anesthesia Evaluation     Patient summary reviewed and Nursing notes reviewed   NPO Solid Status: > 8 hours             Airway   Mallampati: II  TM distance: >3 FB  Neck ROM: full  No difficulty expected  Dental - normal exam     Pulmonary - normal exam   (+) ,sleep apnea  Cardiovascular - normal exam    ECG reviewed    (+) hypertension, dysrhythmias Atrial Fib, hyperlipidemia  (-) angina, GARCIA    ROS comment: Left ventricular ejection fraction is 60 to 65%    Neuro/Psych- negative ROS  GI/Hepatic/Renal/Endo    (+) morbid obesity, GERD    Musculoskeletal (-) negative ROS    Abdominal  - normal exam    Bowel sounds: normal.   Substance History - negative use     OB/GYN negative ob/gyn ROS         Other                    Anesthesia Plan    ASA 3     general       Anesthetic plan, risks, benefits, and alternatives have been provided, discussed and informed consent has been obtained with: patient.    CODE STATUS:    Code Status (Patient has no pulse and is not breathing): CPR (Attempt to Resuscitate)  Medical Interventions (Patient has pulse or is breathing): Full Support

## 2025-01-11 NOTE — OP NOTE
Operative Report    Patient Name:  Ty Bergeron  YOB: 1960    Date of Surgery:  1/11/2025    Pre-op Diagnosis:   Acute cholecystitis [K81.0]       Post-op Diagnosis:   Acute suppurative cholecystitis, hydrops    Procedure(s):  Robotic assisted laparoscopic cholecystectomy    Staff:  Surgeon(s):  Alice Mayberry MD    Circulator: Raysa Anderson RN  Scrub Person: Ileana Vaz  Assistant: Tiffany Slater PA  was responsible for performing the following activities: Closing, Placing Dressing, Held/Positioned Camera, and bedside assist for robotics case  and their skilled assistance was necessary for the success of this case.    Anesthesia: General    Estimated Blood Loss: 50mL    Implants:    None    Drains:  10 mm Case-Ceballos drain-gallbladder fossa/subhepatic space    Specimen:          Specimens       ID Source Type Tests Collected By Collected At Frozen?    A Gallbladder Tissue TISSUE PATHOLOGY EXAM   Alice Mayberry MD 1/11/25 1025           Findings: Omental adhesions to prior hernia repair and anterior abdominal wall.  Inflamed and distended gallbladder.  Hydrops and suppurative cholecystitis.  Dilated cystic duct.  Critical view obtained.    Complications: None immediate    Clinical Indications: The patient is a 64 year old male with epigastric and right upper quadrant pain. Workup showed cholecystitis and cholecystectomy was recommended.  The surgery along with the associated risks, benefits, and alternatives were discussed with the patient. The risks include but are not limited to bleeding, infection, hernia, conversion to open, and common bile duct injury requiring additional procedures. The patient expressed understanding and wished to proceed. Informed consent was obtained.    Description of Procedure:  The patient was brought to the operating room and placed in the supine position with both arms out. Bilateral sequential compression stockings placed on the lower  extremities. The patient was induced and intubated by the Anesthesia service. Perioperative antibiotics were administered. The patient's abdomen was then prepped and draped in the usual sterile fashion. A time out was performed.    Due to the patient having a prior history of open splenectomy through a left paramedian incision as well as prior umbilical hernia repair I elected to gain entry into the abdomen on the right side rather than my usual approach of the insufflating Alcala's point.  A small stab incision in the right upper quadrant at palmers point. A water drop test was performed indicating we were likely intra-abdominal. Insufflation was initiated and a low opening pressure reassured us that we were intra-abdominal. We placed an 8mm robotic trocar in the right mid abdomen at my usual location.  The camera was introduced and the abdomen was inspected to ensure we had not caused any injuries with placement of the veress needle or the initial trocar.  The patient had omental adhesions to the anterior abdominal wall along the left paramedian incision as well as around the umbilicus from prior umbilical hernia repair.  Under direct visualization we placed an 8 mm robotic trocar just to the right of the adhesions.  With blunt dissection we pulled down the omentum from the anterior abdominal wall to free up space in the left lower abdomen for placement of the remaining trocars.  Under direct visualization we placed an 8 mm robotic trocar approximately 1 handbreadth to the left of midline at the level below the umbilicus and a second 8 mm robotic trocar 1 handbreadth lateral to this.    The patient was placed in reverse Trendelenburg position with the right side up. The robot was docked and the camera and instruments loaded and positioned under direct visualization. At this point I sat down at the console to begin the dissection. The fundus of the gallbladder was retracted cephalad and laterally.  The patient's  gallbladder was extremely edematous.  There was adherent omentum to the gallbladder which was freed with blunt dissection.  Peritoneal attachments were taken down staying high on the gallbladder and away from the common bile duct.  Due to the extensive inflammation a large portion of the dissection was completed with blunt dissection with the suction  tip.  Retracting the gallbladder for exposure it perforated spilling a large amount of initially clear fluid consistent with hydrops and then subsequently a large amount of purulent fluid.  All of the fluid was suctioned up and the suction  was placed into the gallbladder to suction out the remaining fluid/contents to minimize spillage during the rest of the case.  We contender our dissection with a significant amount of blunt dissection and minimal electrocautery.  There did not appear to be a dominant cystic artery and it was likely divided with electrocautery.  We are able to dissect out the cystic duct and dissected the inferior half of the gallbladder away from the gallbladder fossa with blunt dissection indicating a single structure at this point entering the gallbladder establishing the critical view.  Patient was administered indocyanine green preoperatively and the gallbladder and cystic duct were not green indicating we were well away from the common bile duct.  The cystic duct was relatively dilated and we did not feel we could dissect further due to the extensive inflammation and risk of causing bleeding or bile duct injury.  The cystic duct was doubly clipped towards the common bile duct and then a single clip was placed on the gallbladder side.  The cystic duct was then divided with electrocautery.  The remainder of the gallbladder was dissected away from the gallbladder fossa with electrocautery.  At the superior half of the gallbladder there was an area where it appeared the gallbladder wall was necrotic.  We continued to dissect the  gallbladder off of the gallbladder fossa with electrocautery until was completely freed.  The right upper quadrant was then suctioned out and irrigated and there is some oozing from the gallbladder fossa were controlled with electrocautery/bipolar.  Once were satisfied with hemostasis we checked the clips on the cystic duct and they appeared in good position.    The gallbladder was placed in an endocatch bag and removed through the trocar site to the left of the umbilicus.  The fascial incision had to be bluntly expanded with a hemostat to allow removal of the gallbladder.  The fascial incision was closed with a transfascial stitch using 0 vicryl suture and the laparoscopic port closure device.  The fascial closure was checked and appeared intact.  A 10 mm Case-Ceballos drain was introduced through the right sided trocar and he was positioned in the gallbladder fossa in the subhepatic space.  The trocar was removed and the drain was secured at the level of the skin with a 3-0 nylon suture.    The robot was undocked and left lateral port was removed under direct visualization and was hemostatic.  Pneumoperitoneum was released and the supraumbilical trocar was removed.  The incisions were closed with 4-0 vicryl, cleaned, and dressed with sterile dressings.    The patient tolerated the procedure well.  He was awakened and extubated by anesthesia and then transferred to the recovery room in stable condition. At the completion of the case, all instrument, needle, and sponge counts were correct.     Alice Mayberry MD     Date: 1/11/2025  Time: 15:53 EST    This note was created using Dragon Voice Recognition software.

## 2025-01-11 NOTE — PROGRESS NOTES
[facility] MEDICINE SERVICE  DAILY PROGRESS NOTE    NAME: Ty Bergeron  : 1960  MRN: [id number]      LOS: 1 day     PROVIDER OF SERVICE: Nate Zhang MD    Chief Complaint: Acute cholecystitis    Subjective:     Interval History:  History taken from: patient    Patient otherwise appears comfortable.  Noted overnight events.  At the present time he is quite pleasant.  He feels though his pain is well-controlled.  No acute complaints.        Review of Systems:   Review of Systems   Constitutional: Negative.    Respiratory: Negative.     Cardiovascular: Negative.    Gastrointestinal:  Positive for abdominal distention, abdominal pain and nausea.   Musculoskeletal: Negative.    Neurological: Negative.    Psychiatric/Behavioral: Negative.         Objective:     Vital Signs  Temp:  [97.4 °F (36.3 °C)-100.3 °F (37.9 °C)] 97.7 °F (36.5 °C)  Heart Rate:  [] 76  Resp:  [12-24] 13  BP: ()/(53-84) 124/74  Flow (L/min) (Oxygen Therapy):  [2-9] 7   Body mass index is 43.65 kg/m².    Physical Exam  Physical Exam  Constitutional:       General: He is awake.      Appearance: Normal appearance. He is well-developed and well-groomed.   HENT:      Head: Normocephalic and atraumatic.      Nose: Nose normal.      Mouth/Throat:      Mouth: Mucous membranes are moist.      Pharynx: Oropharynx is clear.   Eyes:      Extraocular Movements: Extraocular movements intact.      Conjunctiva/sclera: Conjunctivae normal.      Pupils: Pupils are equal, round, and reactive to light.   Cardiovascular:      Rate and Rhythm: Normal rate and regular rhythm.      Pulses: Normal pulses.      Heart sounds: Normal heart sounds.   Pulmonary:      Effort: Pulmonary effort is normal.      Breath sounds: Normal breath sounds.   Abdominal:      General: Abdomen is flat. Bowel sounds are normal. There is distension.      Tenderness: There is abdominal tenderness. There is guarding.   Musculoskeletal:         General: Normal  range of motion.      Cervical back: Normal range of motion and neck supple.      Right lower leg: No edema.      Left lower leg: No edema.   Skin:     General: Skin is warm and dry.   Neurological:      General: No focal deficit present.      Mental Status: He is alert and oriented to person, place, and time. Mental status is at baseline.      Cranial Nerves: Cranial nerves 2-12 are intact.      Sensory: Sensation is intact.      Motor: Motor function is intact.   Psychiatric:         Mood and Affect: Mood normal.         Behavior: Behavior normal. Behavior is cooperative.         Thought Content: Thought content normal.         Judgment: Judgment normal.            Diagnostic Data    Results from last 7 days   Lab Units 01/11/25  0121   WBC 10*3/mm3 16.68*   HEMOGLOBIN g/dL 13.3   HEMATOCRIT % 40.7   PLATELETS 10*3/mm3 118*   GLUCOSE mg/dL 93   CREATININE mg/dL 1.44*   BUN mg/dL 14   SODIUM mmol/L 133*   POTASSIUM mmol/L 4.5   AST (SGOT) U/L 29   ALT (SGPT) U/L 19   ALK PHOS U/L 94   BILIRUBIN mg/dL 1.0   ANION GAP mmol/L 11.5       XR Chest 1 View    Result Date: 1/11/2025  Impression: Low lung volumes without superimposed acute process of the chest. Electronically Signed: Jake Hadley MD  1/11/2025 12:07 AM EST  Workstation ID: [id number]    CT Abdomen Pelvis With Contrast    Result Date: 1/10/2025  1. Acute cholecystitis with stone in the proximal cystic duct 2. Left nephrolithiasis Electronically Signed: Cuong Stapleton  1/10/2025 9:16 AM EST  Workstation ID: [id number]       I reviewed the patient's new clinical results.    Assessment/Plan:     Active and Resolved Problems  Active Hospital Problems    Diagnosis  POA    **Acute cholecystitis [K81.0]  Yes      Resolved Hospital Problems   No resolved problems to display.     Right upper quadrant pain  Abdominal pain  Acute cholecystitis  Leukocytosis  Acute kidney injury  Anxiety  Depression  Hypertension  Hyperlipidemia      Noted acute kidney injury this morning.   Continue IV fluids for now.  Noted low Phos level.  Will replace.  Elevated white count noted as well.  Continue IV antibiotics.  General surgery services consulted.  Plans for laparoscopic cholecystectomy this morning.  Would hold discharge until improvement of white count as well as recheck of electrolytes.  Plan of care has been discussed and reviewed with patient at bedside.  He is agreeable.    VTE Prophylaxis:  Mechanical VTE prophylaxis orders are present.             Disposition Planning:     Barriers to Discharge: Danielle Blakelyy this morning, improvement of labs replacement of electrolytes  Anticipated Date of Discharge: 1/13/2025  Place of Discharge: Home      Time: 30 minutes     Code Status and Medical Interventions: CPR (Attempt to Resuscitate); Full Support   Ordered at: 01/10/25 1116     Code Status (Patient has no pulse and is not breathing):    CPR (Attempt to Resuscitate)     Medical Interventions (Patient has pulse or is breathing):    Full Support       Signature: Electronically signed by Nate Zhang MD, 01/11/25, 09:42 EST.  [facility] Hospitalist Team

## 2025-01-11 NOTE — NURSING NOTE
Drain 4x4 had red blood noted to the site of the SAJI drain. Marked the blood from when pt arrived and It has not changed.

## 2025-01-11 NOTE — NURSING NOTE
Called to patient room by CNA and Charge nurse.  Pt was being aggressive pushing CNA and demanding to walk to bathroom w/o Oxygen.  Pt became tachycardic into the 140's. Diaphoretic, tachypnea, grey in color.  Patient nstructed to get back in bed so I could address his medical condition.  Pain medication prvded for pain.  MD contacted for medication orders r/t  Tachycardia awaiting response

## 2025-01-11 NOTE — ANESTHESIA PROCEDURE NOTES
Airway  Urgency: elective    Date/Time: 1/11/2025 9:35 AM    General Information and Staff    Patient location during procedure: OR  CRNA/CAA: Layne Bell CRNA    Indications and Patient Condition  Indications for airway management: airway protection    Preoxygenated: yes  MILS maintained throughout  Mask difficulty assessment: 1 - vent by mask    Final Airway Details  Final airway type: endotracheal airway      Successful airway: ETT  Cuffed: yes   Successful intubation technique: video laryngoscopy  Facilitating devices/methods: intubating stylet  Endotracheal tube insertion site: oral  Blade: Rodriguez  Blade size: 4  ETT size (mm): 7.5  Cormack-Lehane Classification: grade I - full view of glottis  Placement verified by: chest auscultation and capnometry   Measured from: lips  ETT/EBT  to lips (cm): 22  Number of attempts at approach: 1  Assessment: lips, teeth, and gum same as pre-op and atraumatic intubation

## 2025-01-11 NOTE — PLAN OF CARE
Problem: Adult Inpatient Plan of Care  Goal: Absence of Hospital-Acquired Illness or Injury  Outcome: Progressing     Problem: Pain Acute  Goal: Optimal Pain Control and Function  Outcome: Progressing   Goal Outcome Evaluation:  Plan of Care Reviewed With: patient

## 2025-01-11 NOTE — PLAN OF CARE
Problem: Adult Inpatient Plan of Care  Goal: Absence of Hospital-Acquired Illness or Injury  Intervention: Prevent Skin Injury  Recent Flowsheet Documentation  Taken 1/11/2025 1600 by Vivien Velazquez RN  Body Position: supine     Problem: Adult Inpatient Plan of Care  Goal: Optimal Comfort and Wellbeing  Outcome: Progressing   Goal Outcome Evaluation:  Plan of Care Reviewed With: patient                     Problem: Sepsis/Septic Shock  Goal: Absence of Infection Signs and Symptoms  Outcome: Progressing  Intervention: Initiate Sepsis Management  Recent Flowsheet Documentation  Taken 1/11/2025 1600 by Vivien Velazquez RN  Infection Prevention:   environmental surveillance performed   equipment surfaces disinfected   hand hygiene promoted   single patient room provided  Taken 1/11/2025 1400 by Vivien Velazquez RN  Infection Prevention:   environmental surveillance performed   equipment surfaces disinfected   hand hygiene promoted   single patient room provided

## 2025-01-11 NOTE — NURSING NOTE
Rapid response called r/t change in patient condition.  Respiratory and I felt necessary to get faster tx for patient.  Pt was not on any O2 until around 2150 and now requiring 9 lpm via HFNC.  Pt HR was 145 and now at 126.  B/P is slowly dropping.  Pt has NS 1,000 bolus running.

## 2025-01-11 NOTE — ANESTHESIA POSTPROCEDURE EVALUATION
Patient: Ty Bergeron    Procedure Summary       Date: 01/11/25 Room / Location: [facility] OR 09 / [facility] MAIN OR    Anesthesia Start: 0930 Anesthesia Stop: 1145    Procedure: CHOLECYSTECTOMY LAPAROSCOPIC WITH DAVINCI ROBOT (Abdomen) Diagnosis:       Acute cholecystitis      (Acute cholecystitis [K81.0])    Surgeons: Alice Mayberry MD Provider: Dominic Horn MD    Anesthesia Type: general ASA Status: 3            Anesthesia Type: general    Vitals  Vitals Value Taken Time   /70 01/11/25 1226   Temp 98.1 °F (36.7 °C) 01/11/25 1226   Pulse 83 01/11/25 1227   Resp 13 01/11/25 1226   SpO2 92 % 01/11/25 1227   Vitals shown include unfiled device data.        Post Anesthesia Care and Evaluation    Patient location during evaluation: PACU  Patient participation: complete - patient participated  Level of consciousness: awake  Pain score: 0  Pain management: adequate  Anesthetic complications: No anesthetic complications  PONV Status: none  Cardiovascular status: acceptable  Respiratory status: acceptable  Hydration status: acceptable

## 2025-01-12 ENCOUNTER — READMISSION MANAGEMENT (OUTPATIENT)
Dept: CALL CENTER | Facility: HOSPITAL | Age: 65
End: 2025-01-12
Payer: COMMERCIAL

## 2025-01-12 VITALS
HEART RATE: 89 BPM | HEIGHT: 70 IN | RESPIRATION RATE: 15 BRPM | DIASTOLIC BLOOD PRESSURE: 85 MMHG | TEMPERATURE: 98.1 F | SYSTOLIC BLOOD PRESSURE: 123 MMHG | BODY MASS INDEX: 43.56 KG/M2 | OXYGEN SATURATION: 92 % | WEIGHT: 304.24 LBS

## 2025-01-12 LAB
ALBUMIN SERPL-MCNC: 3.4 G/DL (ref 3.5–5.2)
ALBUMIN/GLOB SERPL: 1.2 G/DL
ALP SERPL-CCNC: 81 U/L (ref 39–117)
ALT SERPL W P-5'-P-CCNC: 36 U/L (ref 1–41)
ANION GAP SERPL CALCULATED.3IONS-SCNC: 9.5 MMOL/L (ref 5–15)
AST SERPL-CCNC: 64 U/L (ref 1–40)
BASOPHILS # BLD AUTO: 0.01 10*3/MM3 (ref 0–0.2)
BASOPHILS NFR BLD AUTO: 0.1 % (ref 0–1.5)
BILIRUB SERPL-MCNC: 0.4 MG/DL (ref 0–1.2)
BUN SERPL-MCNC: 19 MG/DL (ref 8–23)
BUN/CREAT SERPL: 16.5 (ref 7–25)
CA-I SERPL ISE-MCNC: 1.11 MMOL/L (ref 1.15–1.3)
CALCIUM SPEC-SCNC: 8.4 MG/DL (ref 8.6–10.5)
CHLORIDE SERPL-SCNC: 101 MMOL/L (ref 98–107)
CO2 SERPL-SCNC: 21.5 MMOL/L (ref 22–29)
CREAT SERPL-MCNC: 1.15 MG/DL (ref 0.76–1.27)
DEPRECATED RDW RBC AUTO: 51.5 FL (ref 37–54)
EGFRCR SERPLBLD CKD-EPI 2021: 71.5 ML/MIN/1.73
EOSINOPHIL # BLD AUTO: 0 10*3/MM3 (ref 0–0.4)
EOSINOPHIL NFR BLD AUTO: 0 % (ref 0.3–6.2)
ERYTHROCYTE [DISTWIDTH] IN BLOOD BY AUTOMATED COUNT: 15.3 % (ref 12.3–15.4)
GLOBULIN UR ELPH-MCNC: 2.8 GM/DL
GLUCOSE SERPL-MCNC: 137 MG/DL (ref 65–99)
HCT VFR BLD AUTO: 34.4 % (ref 37.5–51)
HGB BLD-MCNC: 11.5 G/DL (ref 13–17.7)
IMM GRANULOCYTES # BLD AUTO: 0.11 10*3/MM3 (ref 0–0.05)
IMM GRANULOCYTES NFR BLD AUTO: 0.7 % (ref 0–0.5)
LYMPHOCYTES # BLD AUTO: 0.41 10*3/MM3 (ref 0.7–3.1)
LYMPHOCYTES NFR BLD AUTO: 2.7 % (ref 19.6–45.3)
MAGNESIUM SERPL-MCNC: 2 MG/DL (ref 1.6–2.4)
MCH RBC QN AUTO: 30.2 PG (ref 26.6–33)
MCHC RBC AUTO-ENTMCNC: 33.4 G/DL (ref 31.5–35.7)
MCV RBC AUTO: 90.3 FL (ref 79–97)
MONOCYTES # BLD AUTO: 0.57 10*3/MM3 (ref 0.1–0.9)
MONOCYTES NFR BLD AUTO: 3.8 % (ref 5–12)
NEUTROPHILS NFR BLD AUTO: 14.05 10*3/MM3 (ref 1.7–7)
NEUTROPHILS NFR BLD AUTO: 92.7 % (ref 42.7–76)
NRBC BLD AUTO-RTO: 0 /100 WBC (ref 0–0.2)
PHOSPHATE SERPL-MCNC: 2.1 MG/DL (ref 2.5–4.5)
PHOSPHATE SERPL-MCNC: 2.2 MG/DL (ref 2.5–4.5)
PLATELET # BLD AUTO: 126 10*3/MM3 (ref 140–450)
PMV BLD AUTO: 12.4 FL (ref 6–12)
POTASSIUM SERPL-SCNC: 4.5 MMOL/L (ref 3.5–5.2)
PROT SERPL-MCNC: 6.2 G/DL (ref 6–8.5)
RBC # BLD AUTO: 3.81 10*6/MM3 (ref 4.14–5.8)
SODIUM SERPL-SCNC: 132 MMOL/L (ref 136–145)
WBC NRBC COR # BLD AUTO: 15.15 10*3/MM3 (ref 3.4–10.8)

## 2025-01-12 PROCEDURE — 25010000002 HEPARIN (PORCINE) PER 1000 UNITS: Performed by: SURGERY

## 2025-01-12 PROCEDURE — 25010000002 CEFTRIAXONE PER 250 MG: Performed by: SURGERY

## 2025-01-12 PROCEDURE — 25010000002 MORPHINE PER 10 MG: Performed by: FAMILY MEDICINE

## 2025-01-12 PROCEDURE — 25010000002 METRONIDAZOLE 500 MG/100ML SOLUTION: Performed by: SURGERY

## 2025-01-12 PROCEDURE — 84100 ASSAY OF PHOSPHORUS: CPT | Performed by: FAMILY MEDICINE

## 2025-01-12 PROCEDURE — G0378 HOSPITAL OBSERVATION PER HR: HCPCS

## 2025-01-12 PROCEDURE — 25810000003 SODIUM CHLORIDE 0.9 % SOLUTION: Performed by: FAMILY MEDICINE

## 2025-01-12 RX ORDER — HYDROCODONE BITARTRATE AND ACETAMINOPHEN 10; 325 MG/1; MG/1
1 TABLET ORAL EVERY 6 HOURS PRN
Qty: 20 TABLET | Refills: 0 | Status: SHIPPED | OUTPATIENT
Start: 2025-01-12 | End: 2025-01-17

## 2025-01-12 RX ADMIN — METRONIDAZOLE 500 MG: 500 INJECTION, SOLUTION INTRAVENOUS at 10:49

## 2025-01-12 RX ADMIN — CEFTRIAXONE SODIUM 1000 MG: 1 INJECTION, POWDER, FOR SOLUTION INTRAMUSCULAR; INTRAVENOUS at 10:04

## 2025-01-12 RX ADMIN — HEPARIN SODIUM 5000 UNITS: 5000 INJECTION INTRAVENOUS; SUBCUTANEOUS at 05:10

## 2025-01-12 RX ADMIN — MORPHINE SULFATE 4 MG: 4 INJECTION, SOLUTION INTRAMUSCULAR; INTRAVENOUS at 05:11

## 2025-01-12 RX ADMIN — ACETAMINOPHEN 650 MG: 325 TABLET, FILM COATED ORAL at 11:53

## 2025-01-12 RX ADMIN — HYDROCODONE BITARTRATE AND ACETAMINOPHEN 1 TABLET: 10; 325 TABLET ORAL at 00:08

## 2025-01-12 RX ADMIN — Medication 10 ML: at 10:04

## 2025-01-12 RX ADMIN — METRONIDAZOLE 500 MG: 500 INJECTION, SOLUTION INTRAVENOUS at 01:26

## 2025-01-12 RX ADMIN — SODIUM CHLORIDE 15 MMOL: 9 INJECTION, SOLUTION INTRAVENOUS at 01:56

## 2025-01-12 NOTE — DISCHARGE SUMMARY
[facility] Medicine Services  Discharge Summary    Date of Service: 2025  Patient Name: Ty Bergeron  : 1960  MRN: [id number]    Date of Admission: 1/10/2025  Discharge Diagnosis: Acute cholecystitis  Date of Discharge: 2025  Primary Care Physician: Richard Darnell, VIKTORIYA      Presenting Problem:   Acute cholecystitis [K81.0]    Active and Resolved Hospital Problems:  Active Hospital Problems   No active problems to display.      Resolved Hospital Problems    Diagnosis POA    **Acute cholecystitis [K81.0] Yes         Hospital Course     HPI:  A 64 y.o. old male patient with PMH of anxiety depression GERD hypertension hyperlipidemia presents to the hospital with complaints of abdominal pain in epigastrium and right upper quadrant intermittently for past few weeks.  Patient developed nausea vomiting and subjective fever at home.  In the ED patient is found to be acute cholecystitis with stone in the proximal cystic duct.  Labs with leukocytosis of 15 AST ALT alkaline phos total bilirubin normal.  Creatinine at 1.  Patient is started on IV Rocephin and Flagyl for now.  Plan for surgery on 2025.     Admitted for acute cholecystitis management.      Hospital Course:  Review original history of physical for details.  Patient was admitted secondary to above.  Patient was started on broad-spectrum IV antibiotics.  General surgery services were consulted.  Patient on 2025 underwent laparoscopic cholecystectomy.  SAJI drain was also placed at that time.     Patient was observed overnight.  Due to elevated white count as well as acute kidney injury patient was placed on IV fluids as well as IV antibiotics.  This morning is otherwise feeling well.      He is remained afebrile over the past 24 hours.  His acute kidney injury has now resolved.  He will be discharged home on oral Augmentin for additional 7 days postdischarge.  He has been given a prescription for 5 days  of oral hydrocodone as well.  He does endorse appropriate postoperative soreness in his abdomen has been asked to ambulate is much as possible.      It was also noted the patient's blood pressure did remain persistently low through hospital course and therefore his lisinopril has been discontinued altogether at time of discharge.  This may be resumed as an outpatient from his primary care provider.  He will be continued on his other routine home medications as previously prescribed.    Does have a SAJI drain in place.  Home health services have been set up.  He will follow-up with general surgery services as an outpatient for removal.        DISCHARGE Follow Up Recommendations for labs and diagnostics: General surgery follow-up within the next 7 to 10 days.  Outpatient CMP, CBC in approximately 7 days as well.        Day of Discharge     Vital Signs:  Temp:  [97.4 °F (36.3 °C)-98.3 °F (36.8 °C)] 98.1 °F (36.7 °C)  Heart Rate:  [70-91] 70  Resp:  [11-20] 15  BP: (102-137)/(58-87) 102/69  Flow (L/min) (Oxygen Therapy):  [4-6] 4    Physical Exam:  Physical Exam       Pertinent  and/or Most Recent Results     LAB RESULTS:      Lab 01/11/25  2306 01/11/25  0121 01/10/25  0751   WBC 15.15* 16.68* 15.03*   HEMOGLOBIN 11.5* 13.3 14.2   HEMATOCRIT 34.4* 40.7 41.5   PLATELETS 126* 118* 170   NEUTROS ABS 14.05* 15.23* 12.27*   IMMATURE GRANS (ABS) 0.11* 0.13* 0.08*   LYMPHS ABS 0.41* 0.41* 0.85   MONOS ABS 0.57 0.79 1.69*   EOS ABS 0.00 0.07 0.07   MCV 90.3 91.7 89.1         Lab 01/11/25  2306 01/11/25  1333 01/11/25  0121 01/10/25  0751   SODIUM 132* 133* 133* 134*   POTASSIUM 4.5 5.4* 4.5 4.6   CHLORIDE 101 105 102 100   CO2 21.5* 16.5* 19.5* 24.5   ANION GAP 9.5 11.5 11.5 9.5   BUN 19 19 14 13   CREATININE 1.15 1.55* 1.44* 1.03   EGFR 71.5 50.0* 54.6* 81.6   GLUCOSE 137* 108* 93 133*   CALCIUM 8.4* 8.2* 8.9 9.5   IONIZED CALCIUM 1.11* 1.00*  --   --    MAGNESIUM 2.0 2.0 1.8  --    PHOSPHORUS 2.2* 3.2  3.2 1.7*  --           Lab 01/11/25  2306 01/11/25  1333 01/11/25  0121 01/10/25  0751   TOTAL PROTEIN 6.2 6.2 6.5 6.7   ALBUMIN 3.4* 3.3* 3.3* 3.8   GLOBULIN 2.8 2.9 3.2 2.9   ALT (SGPT) 36 26 19 15   AST (SGOT) 64* 57* 29 16   BILIRUBIN 0.4 0.9 1.0 0.7   ALK PHOS 81 83 94 75   LIPASE  --   --   --  15                     Brief Urine Lab Results       None          Microbiology Results (last 10 days)       ** No results found for the last 240 hours. **            XR Chest 1 View    Result Date: 1/11/2025  Impression: Impression: Low lung volumes without superimposed acute process of the chest. Electronically Signed: Jake Hadley MD  1/11/2025 12:07 AM EST  Workstation ID: [id number]    CT Abdomen Pelvis With Contrast    Result Date: 1/10/2025  Impression: 1. Acute cholecystitis with stone in the proximal cystic duct 2. Left nephrolithiasis Electronically Signed: Cuong Stapleton  1/10/2025 9:16 AM EST  Workstation ID: [id number]     Results for orders placed during the hospital encounter of 09/27/19    Vascular screening (bundle) CAR    Interpretation Summary  · Normal study      Results for orders placed during the hospital encounter of 09/27/19    Vascular screening (bundle) CAR    Interpretation Summary  · Normal study      Results for orders placed during the hospital encounter of 02/14/22    Adult Transthoracic Echo Complete W/ Cont if Necessary Per Protocol    Interpretation Summary  · Left ventricular systolic function is normal.  · Left ventricular ejection fraction is 60 to 65%  · Left ventricular diastolic function was normal.      Labs Pending at Discharge:  Pending Results       Procedure [Order ID] Specimen - Date/Time    Phosphorus [[id number]] Collected: 01/12/25 1014    Specimen: Blood from Arm, Left     Tissue Pathology Exam [[id number]] Collected: 01/11/25 1025    Specimen: Tissue from Gallbladder Updated: 01/11/25 1207            Procedures Performed  Procedure(s):  CHOLECYSTECTOMY LAPAROSCOPIC WITH DAVINCI ROBOT          Consults:   Consults       Date and Time Order Name Status Description    1/10/2025 10:00 AM Hospitalist (on-call MD unless specified)      1/10/2025  9:44 AM Surgery (on-call MD unless specified) Completed               Discharge Details        Discharge Medications        New Medications        Instructions Start Date   amoxicillin-clavulanate 875-125 MG per tablet  Commonly known as: AUGMENTIN   1 tablet, Oral, 2 Times Daily      HYDROcodone-acetaminophen  MG per tablet  Commonly known as: NORCO   1 tablet, Oral, Every 6 Hours PRN             Continue These Medications        Instructions Start Date   alfuzosin 10 MG 24 hr tablet  Commonly known as: UROXATRAL   10 mg, Oral, Daily      cetirizine 10 MG tablet  Commonly known as: zyrTEC   10 mg, Daily      citalopram 20 MG tablet  Commonly known as: CeleXA   20 mg, Oral, Daily      metoprolol succinate XL 25 MG 24 hr tablet  Commonly known as: TOPROL-XL   25 mg, Oral, Daily      rosuvastatin 10 MG tablet  Commonly known as: CRESTOR   10 mg, Oral, Daily      traZODone 150 MG tablet  Commonly known as: DESYREL   TAKE ONE TABLET BY MOUTH EVERY NIGHT AT BEDTIME      Xarelto 20 MG tablet  Generic drug: rivaroxaban   20 mg, Oral, Daily             Stop These Medications      lisinopril 40 MG tablet  Commonly known as: PRINIVIL,ZESTRIL              Allergies   Allergen Reactions    Lipitor [Atorvastatin Calcium] Myalgia         Discharge Disposition:   Home or Self Care    Diet:  Hospital:  Diet Order   Procedures    Diet: Gastrointestinal; Fat-Restricted; Fluid Consistency: Thin (IDDSI 0)         Discharge Activity:         CODE STATUS:  Code Status and Medical Interventions: CPR (Attempt to Resuscitate); Full Support   Ordered at: 01/10/25 1116     Code Status (Patient has no pulse and is not breathing):    CPR (Attempt to Resuscitate)     Medical Interventions (Patient has pulse or is breathing):    Full Support         Future Appointments   Date Time  Provider Department Center   2/28/2025 10:00 AM Tevin Meza MD [facility]   8/28/2025  9:00 AM Richard Darnell APRN [facility]           Time spent on Discharge including face to face service: 35 minutes    Signature: Electronically signed by Nate Zhang MD, 01/12/25, 10:36 EST.  [facility] Hospitalist Team

## 2025-01-12 NOTE — DISCHARGE INSTRUCTIONS
Call the office to schedule followup appointment approx 1 wks postop  Prescription empty drain daily.  Monitor daily drain output  Keep incisions dry for first 24-48 hrs then may remove overlying bandages but leave white steristrips in place  May shower soap and water after bandages are removed but no baths/soaking x 2 weeks  Low fat diet x 2 wks  No lifting >10-15 lbs x 2 wks  Do not drive while taking narcotics  Over the counter stool softener twice daily until off narcotics and having regular bowel movements  Milk of magnesia as needed if still constipated with stool softeners

## 2025-01-12 NOTE — OUTREACH NOTE
Prep Survey      Flowsheet Row Responses   [facility] patient discharged from? [facility]   Is LACE score < 7 ? Yes   Eligibility [facility]   Date of Admission 01/10/25   Date of Discharge 01/12/25   Discharge diagnosis CHOLECYSTECTOMY LAPAROSCOPIC WITH DAVINCI ROBOT   Does the patient have one of the following disease processes/diagnoses(primary or secondary)? General Surgery   Prep survey completed? Yes            Shawna VARELA - Registered Nurse

## 2025-01-12 NOTE — PROGRESS NOTES
General Surgery Progress Note    Name: Ty Bergeron ADMIT: 1/10/2025   : 1960  PCP: Richard Darnell APRN    MRN: [id number] LOS: 1 days   AGE/SEX: 64 y.o. male  ROOM: [address] [place] [address]    Chief Complaint   Patient presents with    Chest Pain     Subjective     Patient seen and examined.  Vital signs stable, afebrile.  Has been weaned down to 4 L nasal cannula.  Patient reports he does not wear oxygen at home.  Using IV and p.o. meds for pain control.  Not had breakfast yet, but has been tolerating p.o. intake without nausea and vomiting.  SAJI with 60 mL of output since placement.    Objective     Scheduled Medications:   cefTRIAXone, 1,000 mg, Intravenous, Daily  heparin (porcine), 5,000 Units, Subcutaneous, Q8H  metroNIDAZOLE, 500 mg, Intravenous, Q8H  sodium chloride, 1,000 mL, Intravenous, Once  sodium chloride, 10 mL, Intravenous, Q12H        Active Infusions:       As Needed Medications:    acetaminophen **OR** [DISCONTINUED] acetaminophen **OR** acetaminophen    aluminum-magnesium hydroxide-simethicone    Calcium Replacement - Follow Nurse / BPA Driven Protocol    HYDROcodone-acetaminophen    HYDROcodone-acetaminophen    Magnesium Standard Dose Replacement - Follow Nurse / BPA Driven Protocol    melatonin    nitroglycerin    ondansetron ODT **OR** ondansetron    Phosphorus Replacement - Follow Nurse / BPA Driven Protocol    Potassium Replacement - Follow Nurse / BPA Driven Protocol    [COMPLETED] Insert Peripheral IV **AND** sodium chloride    sodium chloride    sodium chloride    Vital Signs  Vital Signs Patient Vitals for the past 24 hrs:   BP Temp Temp src Pulse Resp SpO2   25 0839 102/69 98.1 °F (36.7 °C) Oral -- 15 --   25 0447 109/73 97.6 °F (36.4 °C) Oral 70 17 95 %   25 2333 126/87 97.5 °F (36.4 °C) Oral 91 20 94 %   25 1959 115/73 97.4 °F (36.3 °C) Oral 84 13 93 %   25 1555 137/79 97.6 °F (36.4 °C) Oral 81 16 96 %   25 1226 119/70 98.1 °F (36.7  °C) -- 82 13 93 %   01/11/25 1211 130/75 -- -- 86 11 93 %   01/11/25 1156 102/63 -- -- 85 11 93 %   01/11/25 1151 111/58 -- -- 84 12 92 %   01/11/25 1146 117/66 -- -- 82 12 93 %   01/11/25 1141 116/69 98.3 °F (36.8 °C) Oral 84 15 92 %     I/O:  I/O last 3 completed shifts:  In: 1250 [I.V.:1000; IV Piggyback:250]  Out: 870 [Urine:800; Drains:60; Blood:10]    Physical Exam:  Physical Exam  Constitutional:       General: He is not in acute distress.  Cardiovascular:      Rate and Rhythm: Normal rate.   Pulmonary:      Effort: Pulmonary effort is normal. No respiratory distress.   Abdominal:      General: There is no distension.      Palpations: Abdomen is soft.      Tenderness: There is abdominal tenderness. There is no guarding.      Comments: Soft, large abdomen.  Appropriate tenderness to palpation.  Incisions intact with Steri-Strips and Band-Aids.  SAJI x 1 with serosanguineous output.   Neurological:      Mental Status: He is alert. Mental status is at baseline.   Psychiatric:         Mood and Affect: Mood normal.         Behavior: Behavior normal.         Results Review:     CBC    Results from last 7 days   Lab Units 01/11/25  2306 01/11/25  0121 01/10/25  0751   WBC 10*3/mm3 15.15* 16.68* 15.03*   HEMOGLOBIN g/dL 11.5* 13.3 14.2   PLATELETS 10*3/mm3 126* 118* 170     BMP   Results from last 7 days   Lab Units 01/11/25  2306 01/11/25  1333 01/11/25  0121 01/10/25  0751   SODIUM mmol/L 132* 133* 133* 134*   POTASSIUM mmol/L 4.5 5.4* 4.5 4.6   CHLORIDE mmol/L 101 105 102 100   CO2 mmol/L 21.5* 16.5* 19.5* 24.5   BUN mg/dL 19 19 14 13   CREATININE mg/dL 1.15 1.55* 1.44* 1.03   GLUCOSE mg/dL 137* 108* 93 133*   MAGNESIUM mg/dL 2.0 2.0 1.8  --    PHOSPHORUS mg/dL 2.2* 3.2  3.2 1.7*  --      Radiology(recent) XR Chest 1 View    Result Date: 1/11/2025  Impression: Low lung volumes without superimposed acute process of the chest. Electronically Signed: Jake Hadley MD  1/11/2025 12:07 AM EST  Workstation ID: [id number]      I reviewed the patient's new clinical results.    Assessment & Plan       * No active hospital problems. *      64 y.o. male POD 1 status post robotic assisted laparoscopic cholecystectomy    Diet as tolerated  Antiemetics and pain medication as needed, will discontinue IV morphine  Needs to ambulate is much as possible, out of bed to chair  Use incentive spirometer bedside 10 times an hour while awake  Wean O2 as tolerated  Continue SAJI drain, strip and empty every shift and as needed  Overall, doing okay from general surgery perspective.  Okay to discharge once medically cleared.  He will go home with SAJI drain in place and on a course of antibiotics for 5 days.  Follow-up in clinic in 1 week        This note was created using Dragon Voice Recognition software.    DAMARIS Smith  01/12/25  10:02 EST

## 2025-01-12 NOTE — PLAN OF CARE
Continue to monitor and assess pain.  Patient is alert and oriented with no current pain complaints.   Problem: Adult Inpatient Plan of Care  Goal: Plan of Care Review  Outcome: Progressing  Flowsheets (Taken 1/12/2025 0201 by Caro Ross, RN)  Progress: no change  Plan of Care Reviewed With: patient  Goal: Patient-Specific Goal (Individualized)  Outcome: Progressing  Goal: Absence of Hospital-Acquired Illness or Injury  Outcome: Progressing  Intervention: Identify and Manage Fall Risk  Flowsheets (Taken 1/12/2025 0433 by Caro Ross, RN)  Safety Promotion/Fall Prevention:   assistive device/personal items within reach   clutter free environment maintained   nonskid shoes/slippers when out of bed   room organization consistent   safety round/check completed  Intervention: Prevent Skin Injury  Flowsheets  Taken 1/12/2025 0747 by Cookie Farris RN  Skin Protection: drying agents applied  Taken 1/11/2025 2159 by Antonia Kowalski PCT  Body Position: position changed independently  Intervention: Prevent and Manage VTE (Venous Thromboembolism) Risk  Flowsheets (Taken 1/11/2025 2018 by Caro Ross, RN)  VTE Prevention/Management:   patient refused intervention   SCDs (sequential compression devices) off  Intervention: Prevent Infection  Flowsheets (Taken 1/12/2025 0747)  Infection Prevention:   hand hygiene promoted   personal protective equipment utilized   rest/sleep promoted   single patient room provided   equipment surfaces disinfected  Goal: Optimal Comfort and Wellbeing  Outcome: Progressing  Intervention: Monitor Pain and Promote Comfort  Flowsheets (Taken 1/12/2025 0008 by Caro Ross, RN)  Pain Management Interventions: pain medication given  Intervention: Provide Person-Centered Care  Flowsheets (Taken 1/11/2025 2018 by Caro Ross, RN)  Trust Relationship/Rapport:   care explained   choices provided  Goal: Readiness for Transition of Care  Outcome: Progressing  Intervention: Mutually Develop  Transition Plan  Flowsheets (Taken 1/12/2025 0200 by Caro Ross, RN)  Equipment Needed After Discharge: none  Equipment Currently Used at Home: none  Anticipated Changes Related to Illness: none  Transportation Anticipated: family or friend will provide  Transportation Concerns: none  Concerns to be Addressed: denies needs/concerns at this time  Readmission Within the Last 30 Days: no previous admission in last 30 days  Patient/Family Anticipated Services at Transition: none  Patient/Family Anticipates Transition to: home with family     Problem: Pain Acute  Goal: Optimal Pain Control and Function  Outcome: Progressing  Intervention: Optimize Psychosocial Wellbeing  Flowsheets (Taken 1/11/2025 2018 by Caro Ross, RN)  Supportive Measures:   active listening utilized   self-care encouraged  Diversional Activities: television  Intervention: Develop Pain Management Plan  Flowsheets (Taken 1/12/2025 0008 by Caro Ross, RN)  Pain Management Interventions: pain medication given  Intervention: Prevent or Manage Pain  Flowsheets  Taken 1/12/2025 0222 by Caro Ross, RN  Medication Review/Management:   medications reviewed   high-risk medications identified  Taken 1/11/2025 2018 by Caro Ross, RN  Sensory Stimulation Regulation: television on  Bowel Elimination Promotion:   adequate fluid intake promoted   ambulation promoted  Sleep/Rest Enhancement: awakenings minimized     Problem: Nausea and Vomiting  Goal: Nausea and Vomiting Relief  Outcome: Progressing  Intervention: Prevent and Manage Nausea and Vomiting  Flowsheets (Taken 1/11/2025 2018 by Caro Ross, RN)  Environmental Support: calm environment promoted     Problem: Sepsis/Septic Shock  Goal: Optimal Coping  Outcome: Progressing  Intervention: Support Patient and Family Response  Flowsheets (Taken 1/11/2025 2018 by Caro Ross, RN)  Supportive Measures:   active listening utilized   self-care encouraged  Family/Support System Care:    support provided   self-care encouraged  Goal: Absence of Bleeding  Outcome: Progressing  Intervention: Monitor and Manage Bleeding  Flowsheets (Taken 1/11/2025 2018 by Caro Ross, RN)  Bleeding Precautions: monitored for signs of bleeding  Goal: Blood Glucose Level Within Target Range  Outcome: Progressing  Goal: Absence of Infection Signs and Symptoms  Outcome: Progressing  Intervention: Initiate Sepsis Management  Flowsheets (Taken 1/12/2025 0779)  Infection Prevention:   hand hygiene promoted   personal protective equipment utilized   rest/sleep promoted   single patient room provided   equipment surfaces disinfected  Intervention: Promote Recovery  Flowsheets  Taken 1/12/2025 0433 by Caro Ross, RN  Airway/Ventilation Management:   calming measures promoted   airway patency maintained  Taken 1/11/2025 2018 by Caro Ross, RN  Activity Management: activity encouraged  Sleep/Rest Enhancement: awakenings minimized  Goal: Optimal Nutrition Delivery  Outcome: Progressing     Problem: Fall Injury Risk  Goal: Absence of Fall and Fall-Related Injury  Outcome: Progressing  Intervention: Identify and Manage Contributors  Flowsheets  Taken 1/12/2025 0222 by Caro Ross, RN  Medication Review/Management:   medications reviewed   high-risk medications identified  Taken 1/11/2025 2018 by Caro Ross, RN  Self-Care Promotion:   independence encouraged   BADL personal objects within reach  Intervention: Promote Injury-Free Environment  Flowsheets (Taken 1/12/2025 0433 by Caro Ross, RN)  Safety Promotion/Fall Prevention:   assistive device/personal items within reach   clutter free environment maintained   nonskid shoes/slippers when out of bed   room organization consistent   safety round/check completed     Problem: Skin Injury Risk Increased  Goal: Skin Health and Integrity  Outcome: Progressing  Intervention: Optimize Skin Protection  Flowsheets  Taken 1/12/2025 0750 by Cookie Farris RN  Skin  Protection: drying agents applied  Taken 1/11/2025 2159 by Antonia Kowalski, PCT  Head of Bed (HOB) Positioning: HOB elevated  Taken 1/11/2025 2018 by Caro Ross, RN  Activity Management: activity encouraged  Pressure Reduction Techniques: frequent weight shift encouraged  Pressure Reduction Devices: specialty bed utilized   Goal Outcome Evaluation:

## 2025-01-12 NOTE — PLAN OF CARE
Problem: Adult Inpatient Plan of Care  Goal: Plan of Care Review  Outcome: Not Progressing  Flowsheets (Taken 1/12/2025 0201)  Progress: no change  Plan of Care Reviewed With: patient  Goal: Patient-Specific Goal (Individualized)  Outcome: Not Progressing  Goal: Absence of Hospital-Acquired Illness or Injury  Outcome: Not Progressing  Intervention: Identify and Manage Fall Risk  Recent Flowsheet Documentation  Taken 1/12/2025 0008 by Caro Ross RN  Safety Promotion/Fall Prevention:   assistive device/personal items within reach   clutter free environment maintained   nonskid shoes/slippers when out of bed   room organization consistent   safety round/check completed  Taken 1/11/2025 2252 by Caro Ross RN  Safety Promotion/Fall Prevention:   assistive device/personal items within reach   clutter free environment maintained   nonskid shoes/slippers when out of bed   room organization consistent   safety round/check completed  Taken 1/11/2025 2018 by Caro Ross RN  Safety Promotion/Fall Prevention:   assistive device/personal items within reach   clutter free environment maintained   nonskid shoes/slippers when out of bed   room organization consistent   safety round/check completed  Intervention: Prevent Skin Injury  Recent Flowsheet Documentation  Taken 1/11/2025 2018 by Caro Ross RN  Body Position:   position changed independently   supine  Intervention: Prevent and Manage VTE (Venous Thromboembolism) Risk  Recent Flowsheet Documentation  Taken 1/11/2025 2018 by Caro Ross RN  VTE Prevention/Management:   patient refused intervention   SCDs (sequential compression devices) off  Intervention: Prevent Infection  Recent Flowsheet Documentation  Taken 1/12/2025 0008 by Caro Ross RN  Infection Prevention:   environmental surveillance performed   hand hygiene promoted   single patient room provided  Taken 1/11/2025 2252 by Caro Ross RN  Infection Prevention:   environmental  surveillance performed   hand hygiene promoted   single patient room provided  Taken 1/11/2025 2018 by Caro Ross, RN  Infection Prevention:   environmental surveillance performed   hand hygiene promoted   single patient room provided  Goal: Optimal Comfort and Wellbeing  Outcome: Not Progressing  Intervention: Monitor Pain and Promote Comfort  Recent Flowsheet Documentation  Taken 1/12/2025 0008 by Caro Ross RN  Pain Management Interventions: pain medication given  Intervention: Provide Person-Centered Care  Recent Flowsheet Documentation  Taken 1/11/2025 2018 by Caro Ross RN  Trust Relationship/Rapport:   care explained   choices provided  Goal: Readiness for Transition of Care  Outcome: Not Progressing  Intervention: Mutually Develop Transition Plan  Recent Flowsheet Documentation  Taken 1/12/2025 0200 by Caro Ross, RN  Equipment Needed After Discharge: none  Equipment Currently Used at Home: none  Anticipated Changes Related to Illness: none  Transportation Anticipated: family or friend will provide  Transportation Concerns: none  Concerns to be Addressed: denies needs/concerns at this time  Readmission Within the Last 30 Days: no previous admission in last 30 days  Patient/Family Anticipated Services at Transition: none  Patient/Family Anticipates Transition to: home with family   Goal Outcome Evaluation:  Plan of Care Reviewed With: patient        Progress: no change     Alert and oriented x 4. Able to verbalize needs and wants. Takes medications whole and tolerates well. C/O abdominal pain, PRN Morphine and Norco administered with positive effect noted. Continues to require O2 therapy at 4 LPM via NC and tolerating well. At baseline, patient does not require O2 therapy. Stand by for transfers/ambulation. NaCl currently infusing at 75 ml/hr and tolerating well. Continues to receive IV Rocephin and Flagyl for intraabdominal infection, no s/s of adverse/allergic reactions noted. Remains on  clear liquid diet, tolerating well. S/P gall bladder removal, no complications noted. Ambulation encouraged. Currently in bed, awake. Call bell in reach. From home.

## 2025-01-13 ENCOUNTER — TELEPHONE (OUTPATIENT)
Dept: SURGERY | Facility: CLINIC | Age: 65
End: 2025-01-13
Payer: COMMERCIAL

## 2025-01-13 ENCOUNTER — TRANSITIONAL CARE MANAGEMENT TELEPHONE ENCOUNTER (OUTPATIENT)
Dept: CALL CENTER | Facility: HOSPITAL | Age: 65
End: 2025-01-13
Payer: COMMERCIAL

## 2025-01-13 NOTE — OUTREACH NOTE
Call Center TCM Note      Flowsheet Row Responses   [facility] facility patient discharged from? [facility]   Does the patient have one of the following disease processes/diagnoses(primary or secondary)? General Surgery   TCM attempt successful? No  [Brother]   Unsuccessful attempts Attempt 1            Iman Bernardo RN    1/13/2025, 09:36 EST

## 2025-01-13 NOTE — TELEPHONE ENCOUNTER
Call placed to patient to follow up after surgery and Schedule follow up appointment with Reed for 1/20/2025 Left message on machine and encouraged to call to get scheduled for appt.

## 2025-01-13 NOTE — CASE MANAGEMENT/SOCIAL WORK
Case Management Discharge Note      Final Note: Routine home.         Selected Continued Care - Discharged on 1/12/2025 Admission date: 1/10/2025 - Discharge disposition: Home or Self Care       Transportation Services  Private: Car    Final Discharge Disposition Code: 01 - home or self-care

## 2025-01-13 NOTE — OUTREACH NOTE
Call Center TCM Note      Flowsheet Row Responses   [facility] facility patient discharged from? [facility]   Does the patient have one of the following disease processes/diagnoses(primary or secondary)? General Surgery   TCM attempt successful? No   Unsuccessful attempts Attempt 2            Iman Bernardo RN    1/13/2025, 16:07 EST

## 2025-01-14 ENCOUNTER — TRANSITIONAL CARE MANAGEMENT TELEPHONE ENCOUNTER (OUTPATIENT)
Dept: CALL CENTER | Facility: HOSPITAL | Age: 65
End: 2025-01-14
Payer: COMMERCIAL

## 2025-01-14 LAB
LAB AP CASE REPORT: NORMAL
PATH REPORT.FINAL DX SPEC: NORMAL
PATH REPORT.GROSS SPEC: NORMAL

## 2025-01-14 NOTE — OUTREACH NOTE
Call Center TCM Note      Flowsheet Row Responses   [facility] patient discharged from? [facility]   Does the patient have one of the following disease processes/diagnoses(primary or secondary)? General Surgery   TCM attempt successful? Yes   Call start time 1432   Call end time 1436   Discharge diagnosis CHOLECYSTECTOMY LAPAROSCOPIC WITH DAVINCI ROBOT   Meds reviewed with patient/caregiver? Yes   Is the patient having any side effects they believe may be caused by any medication additions or changes? No   Does the patient have all medications related to this admission filled (includes all antibiotics, pain medications, etc.) Yes   Is the patient taking all medications as directed (includes completed medication regime)? Yes   Medication comments antibiotic   Comments Post Op appt. 1/20/25 @ 1pm.   Does the patient have an appointment with their PCP within 7-14 days of discharge? No   Nursing Interventions Patient declined scheduling/rescheduling appointment at this time   Has home health visited the patient within 72 hours of discharge? N/A   Psychosocial issues? No   Did the patient receive a copy of their discharge instructions? Yes   Nursing interventions Reviewed instructions with patient   What is the patient's perception of their health status since discharge? Improving   Nursing interventions Nurse provided patient education   Is the patient /caregiver able to teach back basic post-op care? Take showers only when approved by MD-sponge bathe until then, Keep incision areas clean,dry and protected   Is the patient/caregiver able to teach back signs and symptoms of incisional infection? Increased redness, swelling or pain at the incisonal site, Fever, Increased drainage or bleeding   Is the patient/caregiver able to teach back steps to recovery at home? Rest and rebuild strength, gradually increase activity, Eat a well-balance diet   If the patient is a current smoker, are they able to teach back resources for  cessation? Not a smoker   Is the patient/caregiver able to teach back the hierarchy of who to call/visit for symptoms/problems? PCP, Specialist, Home health nurse, Urgent Care, ED, 911 Yes   TCM call completed? Yes   Wrap up additional comments Pt. reports doing well, currently at the grocery store. To f/u with Dr. Zheng to have SAJI drain checked, in 1 wk, v/u.   Call end time 1436   Would this patient benefit from a Referral to [facility] Social Work? No   Is the patient interested in additional calls from an ambulatory ? No            Britney Potter, RN    1/14/2025, 14:38 EST

## 2025-01-17 LAB
QT INTERVAL: 322 MS
QTC INTERVAL: 459 MS

## 2025-01-20 ENCOUNTER — OFFICE VISIT (OUTPATIENT)
Dept: SURGERY | Facility: CLINIC | Age: 65
End: 2025-01-20
Payer: COMMERCIAL

## 2025-01-20 VITALS
HEART RATE: 120 BPM | BODY MASS INDEX: 41.32 KG/M2 | SYSTOLIC BLOOD PRESSURE: 148 MMHG | OXYGEN SATURATION: 96 % | TEMPERATURE: 98.2 F | RESPIRATION RATE: 22 BRPM | DIASTOLIC BLOOD PRESSURE: 106 MMHG | WEIGHT: 288.6 LBS | HEIGHT: 70 IN

## 2025-01-20 DIAGNOSIS — K81.0 ACUTE CHOLECYSTITIS: Primary | ICD-10-CM

## 2025-01-20 PROCEDURE — 99024 POSTOP FOLLOW-UP VISIT: CPT

## 2025-01-20 NOTE — PROGRESS NOTES
"Chief Complaint  Post-op (CHOLECYSTECTOMY 1/11 po)    Subjective        Ty Bergeron presents to [facility] GENERAL SURGERY status post robotic assisted laparoscopic cholecystectomy with Dr. Mayberry on 1/11.  Reports some intermittent right upper quadrant pain, but overall doing okay.  Tolerating regular diet without nausea or vomiting.  Initially had some diarrhea postoperative that has since resolved.  SAJI drain with serosanguineous output.  Has not been recording daily output.    Objective   Vital Signs:  BP (!) 148/106 (BP Location: Left arm, Patient Position: Sitting, Cuff Size: Adult)   Pulse 120   Temp 98.2 °F (36.8 °C) (Temporal)   Resp 22   Ht 177.8 cm (70\")   Wt 131 kg (288 lb 9.6 oz)   SpO2 96%   BMI 41.41 kg/m²   Estimated body mass index is 41.41 kg/m² as calculated from the following:    Height as of this encounter: 177.8 cm (70\").    Weight as of this encounter: 131 kg (288 lb 9.6 oz).            Physical Exam  Constitutional:       General: He is not in acute distress.  Cardiovascular:      Rate and Rhythm: Tachycardia present.   Pulmonary:      Effort: Pulmonary effort is normal. No respiratory distress.   Abdominal:      General: There is no distension.      Palpations: Abdomen is soft.      Tenderness: There is abdominal tenderness. There is no guarding.      Comments: Soft, large abdomen.  Minimal tenderness to palpation.  Incisions intact with Steri-Strips.  SAJI x 1 with serosanguineous output.   Neurological:      Mental Status: He is alert. Mental status is at baseline.   Psychiatric:         Mood and Affect: Mood normal.         Behavior: Behavior normal.        Result Review :                Assessment and Plan   Diagnoses and all orders for this visit:    1. Acute cholecystitis (Primary)    status post robotic assisted laparoscopic cholecystectomy with Dr. Mayberry on 1/11.  Reports some intermittent right upper quadrant pain, but overall doing okay.  Tolerating " regular diet without nausea or vomiting.  Initially had some diarrhea postoperative that has since resolved.  SAJI drain with serosanguineous output.  Has not been recording daily output.  Pathology discussed with patient.  Upon examination, appears SAJI drainage is still serosanguineous.  Recommended continuing SAJI drain for another week.  Discussed right upper quadrant pain could be secondary to drain placement.  Discussed continuing current physical restrictions of no lifting greater than 10 to 15 pounds until 2 weeks postop.  Educated patient on recording daily output of SAJI drain and stripping SAJI tubing.  Follow-up in 1 week for possible drain removal.         Follow Up   No follow-ups on file.  Patient was given instructions and counseling regarding his condition or for health maintenance advice. Please see specific information pulled into the AVS if appropriate.

## 2025-01-27 ENCOUNTER — OFFICE VISIT (OUTPATIENT)
Dept: SURGERY | Facility: CLINIC | Age: 65
End: 2025-01-27
Payer: COMMERCIAL

## 2025-01-27 VITALS
HEIGHT: 70 IN | SYSTOLIC BLOOD PRESSURE: 117 MMHG | OXYGEN SATURATION: 97 % | WEIGHT: 284 LBS | HEART RATE: 113 BPM | DIASTOLIC BLOOD PRESSURE: 77 MMHG | TEMPERATURE: 96.9 F | BODY MASS INDEX: 40.66 KG/M2

## 2025-01-27 DIAGNOSIS — K81.0 ACUTE CHOLECYSTITIS: Primary | ICD-10-CM

## 2025-01-27 PROCEDURE — 99024 POSTOP FOLLOW-UP VISIT: CPT

## 2025-01-27 NOTE — PROGRESS NOTES
"Chief Complaint  Post-op Follow-up (POST-OP - CHOLECYSTECTOMY 1/11/)    Subjective        Ty Bergeron presents to [facility] GENERAL SURGERY status post robotic assisted laparoscopic cholecystectomy with Dr. Mayberry on 1/11, following up for drain removal.  Reports appetite is slowly improving, no nausea or vomiting.  Having regular bowel function.  Has had about 15 mL of SAJI output over the last week.    Objective   Vital Signs:  /77 (BP Location: Right arm, Patient Position: Sitting, Cuff Size: Large Adult)   Pulse 113   Temp 96.9 °F (36.1 °C) (Infrared)   Ht 177.8 cm (70\")   Wt 129 kg (284 lb)   SpO2 97%   BMI 40.75 kg/m²   Estimated body mass index is 40.75 kg/m² as calculated from the following:    Height as of this encounter: 177.8 cm (70\").    Weight as of this encounter: 129 kg (284 lb).            Physical Exam  Constitutional:       General: He is not in acute distress.  Cardiovascular:      Rate and Rhythm: Tachycardia present.   Pulmonary:      Effort: Pulmonary effort is normal. No respiratory distress.   Abdominal:      General: There is no distension.      Palpations: Abdomen is soft.      Tenderness: There is no abdominal tenderness. There is no guarding.      Comments: Soft, large abdomen.  Nontender.  Incisions appear to be healing well.  SAJI x 1 with scant amount of serosanguineous output in tubing   Neurological:      Mental Status: He is alert. Mental status is at baseline.   Psychiatric:         Mood and Affect: Mood normal.         Behavior: Behavior normal.        Result Review :                Assessment and Plan   Diagnoses and all orders for this visit:    1. Acute cholecystitis (Primary)    status post robotic assisted laparoscopic cholecystectomy with Dr. Mayberry on 1/11, following up for drain removal.  Reports appetite is slowly improving, no nausea or vomiting.  Having regular bowel function.  Has had about 15 mL of SAJI output over the last week.  Re " educated patient on optimizing nutrition and recommended protein shakes/boost as appetite slowly returns.  Drain removed and replaced with bandage.  Reassured patient that prior drain site will heal over the next week or 2 and recommended changing dressing once daily.  No further physical restrictions.  No dietary restrictions.  Can follow-up as needed.         Follow Up   No follow-ups on file.  Patient was given instructions and counseling regarding his condition or for health maintenance advice. Please see specific information pulled into the AVS if appropriate.

## 2025-02-20 RX ORDER — RIVAROXABAN 20 MG/1
20 TABLET, FILM COATED ORAL DAILY
Qty: 30 TABLET | Refills: 1 | Status: SHIPPED | OUTPATIENT
Start: 2025-02-20

## 2025-02-28 ENCOUNTER — OFFICE VISIT (OUTPATIENT)
Dept: CARDIOLOGY | Facility: CLINIC | Age: 65
End: 2025-02-28
Payer: COMMERCIAL

## 2025-02-28 VITALS
RESPIRATION RATE: 18 BRPM | HEIGHT: 70 IN | WEIGHT: 283 LBS | HEART RATE: 96 BPM | SYSTOLIC BLOOD PRESSURE: 111 MMHG | OXYGEN SATURATION: 95 % | DIASTOLIC BLOOD PRESSURE: 74 MMHG | BODY MASS INDEX: 40.52 KG/M2

## 2025-02-28 DIAGNOSIS — E78.49 OTHER HYPERLIPIDEMIA: ICD-10-CM

## 2025-02-28 DIAGNOSIS — I10 HYPERTENSION, UNSPECIFIED TYPE: ICD-10-CM

## 2025-02-28 DIAGNOSIS — I49.5 SICK SINUS SYNDROME: ICD-10-CM

## 2025-02-28 DIAGNOSIS — I48.0 PAROXYSMAL ATRIAL FIBRILLATION: Primary | ICD-10-CM

## 2025-02-28 DIAGNOSIS — Z01.818 PRE-OP EXAMINATION: ICD-10-CM

## 2025-02-28 DIAGNOSIS — I10 PRIMARY HYPERTENSION: ICD-10-CM

## 2025-02-28 NOTE — PROGRESS NOTES
Cardiology Clinic Note  Tevin Meza MD, PhD    Subjective:     Encounter Date:02/28/2025      Patient ID: Ty Bergeron is a 64 y.o. male.    Chief Complaint:  Chief Complaint   Patient presents with    Follow-up       HPI:           I the pleasure seeing this very pleasant 64-year-old gentleman is a new patient today for chief complaint of chest pain.  Blood pressure 160/78 heart rates in 70s.  He has risk factors of hypertension hyperlipidemia family history with brother with multivessel bypass surgery and his father had an MI in the past.  He is a non-smoker he is not diabetic but he has 3-4 beers a day and he drinks a lot of coffee every day as well.  He displays dyspnea on exertion and substernal chest pain with exertion predictable fashion concerning for obstructive coronary disease as well as CCS class III angina.  Per history he does have a history of thromboembolic disease with DVT PE which also may need to be examined his he is not presently on anticoagulation systemically if cardiac work-up is unremarkable.  He underwent left heart catheterization with coronary angiography, 2D echo.  He had structurally normal heart with normal LV and RV size and function, no significant valvular abnormality other than trace to mild regurgitation.  Blood pressures controlled today.  Coronary angiography revealed small arteries which tapers dramatically but no obstructive CAD other than mild luminal regularities and he had normal filling pressures.  Today on repeat encounter, otherwise doing well from a cardiac standpoint on stable pharmacotherapy.  Recently had cholecystectomy, he has had both his knees done in the past limiting his functional status resulting in weight gain.  He denies any new symptoms today is otherwise doing well no unexplained syncope no unexplained edema no shortness of breath above normal.  We discussed that he has no restrictions for any diet exercise or other exertional restrictions from a  cardiac standpoint, calcium score was negligible risk at 3.1..       Review of systems otherwise negative x 14 point review of systems except as mentioned above  Historical data copied forward from previous encounters in EMR including the history, exam, and assessment/plan has been reviewed and is unchanged unless noted otherwise.     Cardiac medicines reviewed with risk, benefits, and necessity of each discussed.     Risk and benefit of cardiac testing reviewed including death heart attack stroke pain bleeding infection need for vascular /cardiovascular surgery were discussed and the patient      Objective:      Vitals reviewed below     Physical Exam  Regular rate and rhythm no rubs gallops heaves left  1 out of 6 systolic ejection murmur lower sternal border  No clubbing cyanosis or edema  Obese soft nontender nondistended  Clear to auscultation  No carotid bruits or JVD  Change from prior encounter        Assessment:       Shortness of breath dyspnea exertion  Appears stable  EF normal  Normal diastolic function  Normal RV size and function with normal atrial sizes  Trace to mild valvular insufficiency as documented, needs repeat echo in 3 to 5 years per guidelines  Primary prevention goals for CAD  Diet and exercise per AHA guidelines  Continue present medications     Yearly labs looked okay with no changes  Calcium score of only 3.1, negligible atherosclerotic disease, low risk  On appropriate statin therapy without aspirin  Gentle beta-blocker continues  No recurrence of any atrial fibrillation  Continue anticoagulation indefinitely, [id number] score 2  Xarelto 20 daily again without aspirin    Advance activity as tolerated  Diet exercise per AHA guidelines, encouraged resistance activity as well in addition to aerobic activity     Return to clinic in 1 year, reassess for refills at that time, he has been graduated out of EP  Back to primary care in the interim            Films for left heart  "catheterization were reviewed with patient at length previously, EKG is unchanged          Objective:         /74 (BP Location: Left arm, Patient Position: Sitting)   Pulse 96   Resp 18   Ht 177.8 cm (70\")   Wt 128 kg (283 lb)   SpO2 95%   BMI 40.61 kg/m²     Physical Exam    Assessment:         There are no diagnoses linked to this encounter.       Plan:              The pleasure to be involved in this patient's cardiovascular care.  Please call with any questions or concerns  Tevin Meza MD, PhD    Most recent EKG as reviewed and interpreted by me:  Procedures     Most recent echo as reviewed and interpreted by me:  Results for orders placed during the hospital encounter of 02/14/22    Adult Transthoracic Echo Complete W/ Cont if Necessary Per Protocol    Interpretation Summary  · Left ventricular systolic function is normal.  · Left ventricular ejection fraction is 60 to 65%  · Left ventricular diastolic function was normal.      Most recent stress test as reviewed and interpreted by me:      Most recent cardiac catheterization as reviewed interpreted by me:  Results for orders placed during the hospital encounter of 02/03/22    Cardiac Catheterization/Vascular Study    Narrative   Tevin Meza MD, PhD  [facility] cardiology  Date of service 2-3-22    Procedure  1 left heart catheterization with coronary angiography and left ventriculography in BATISTA position    Indication  Refractory exertional chest pain to medical therapy, high pretest probability given risk factors for CAD    After informed consent patient was brought to the catheterization lab sterilely prepped and draped in usual fashion exposure the right groin for right common femoral arterial access via micropuncture modified center technique, a 6 Togolese sheath was placed under fluoroscopic guidance which was aspirated flushed with heparinized saline.  035 guidewire was advanced through eval followed by diagnostic JL4 " and JR4 catheters for selective left and right coronary angiography.  JR4 was used across aortic valve easily, EDP assessment pullback assessment of the transaortic valve gradient after hand-injection for LV gram demonstrated normal LV systolic function.  There was no obstructive CAD, all catheters were removed.  6 Kinyarwanda Angio-Seal was used to close right common femoral durotomy with immediate hemostasis and means of distal pulses.  There are no complications.  Patient left the Cath Lab chest pain-free hemodynamically electrically stable or talking to staff neurologically grossly tight bilaterally    Complications none  Blood loss less than 5 cc  Contrast used is 85 cc for the entirety procedure including LV gram  Moderate conscious sedation of 30 minutes with IV Versed and fentanyl administered by registered nurse with complete ECG pulse oximetry and hemodynamic monitoring throughout the entirety of the case observed by me    Findings  1.  Open aortic pressure was 82/40, 100 mcg of Jovani-Synephrine was given with a starting pressure for the case of greater than 100 systolic to ensure safety of the procedure  2.  Closing pressure 110/66  3.  LVEDP 5  4.  For LV function normal LVEF 65 to 70%  5.  No transaortic valve gradient or LVOT gradient seen on pullback    Angiography  1 left main medium caliber vessel with an acute angle takeoff but no obstructive disease giving LAD and nondominant circumflex  2.  The LAD is a medium caliber vessel coursing to and apex with 2 diagonal branches, there is lumen regularities only less than 20% throughout this distribution  3.  Nondominant circumflex with 1 obtuse marginal branch distally and a small recurrent left atrial branch, there is minimal angiographic disease throughout next  #4 RCA is a very large caliber vessel with large PLV and PDA segments which reaches the apex, there is no angiographic disease throughout the RCA distribution    Angiographically left-sided vessels  appear very small, appears to taper distally might have microvascular disease or microvascular dysfunction with mildly slow filling despite normalization of blood pressures with Jovani-Synephrine and low LVEDP.    Conclusions and recommendations  1.  Normal epicardial coronary anatomy, no epicardial obstructive CAD, low LVEDP at 4 to 5 mmHg, normal LV systolic function and normal transaortic valve gradient  2.  Encourage hydration  3.  Optimize antihypertensive, minimize hypotension, consider stopping calcium channel blockers  4.  No restrictions, diet and exercise is recommended given obesity and likely significant deconditioning with BMI greater than 40, likely microvascular function metabolic syndrome    Patient be discharged safely home today    Tevin Meza MD, PhD    The following portions of the patient's history were reviewed and updated as appropriate: allergies, current medications, past family history, past medical history, past social history, past surgical history, and problem list.      ROS:  14 point review of systems negative except as mentioned above    Current Outpatient Medications:     alfuzosin (UROXATRAL) 10 MG 24 hr tablet, Take 1 tablet by mouth Daily., Disp: 90 tablet, Rfl: 1    cetirizine (zyrTEC) 10 MG tablet, Take 1 tablet by mouth Daily., Disp: , Rfl:     citalopram (CeleXA) 20 MG tablet, Take 1 tablet by mouth Daily., Disp: 30 tablet, Rfl: 3    metoprolol succinate XL (TOPROL-XL) 25 MG 24 hr tablet, Take 1 tablet by mouth Daily., Disp: 90 tablet, Rfl: 3    rosuvastatin (CRESTOR) 10 MG tablet, TAKE 1 TABLET BY MOUTH DAILY, Disp: 30 tablet, Rfl: 3    traZODone (DESYREL) 150 MG tablet, TAKE ONE TABLET BY MOUTH EVERY NIGHT AT BEDTIME, Disp: 30 tablet, Rfl: 3    vitamin D3 125 MCG (5000 UT) capsule capsule, Take 1 capsule by mouth Daily., Disp: , Rfl:     Xarelto 20 MG tablet, TAKE 1 TABLET BY MOUTH DAILY, Disp: 30 tablet, Rfl: 1    Problem List:  Patient Active Problem List   Diagnosis     Acute embolism and thrombosis of deep vein of lower extremity    Anemia    Anxiety    Calculus of gallbladder    Carpal tunnel syndrome    Depression    Gastroesophageal reflux disease    Hypercholesterolemia    Hyperglycemia    Hypertension    Hypogonadism in male    Physical exam    Rotator cuff tendonitis, right    Subacromial bursitis of right shoulder joint    Primary osteoarthritis of both knees    Leg cramps    Combined arterial insufficiency and corporo-venous occlusive erectile dysfunction    Thrombocytopenia    H/O splenectomy    Primary osteoarthritis of right shoulder    Lung nodule    Umbilical hernia    Sleep apnea    Personal history of endocrine, metabolic, and immunity disorder    Osteoarthritis of knee    Obesity    Nerve root disorder    Medial epicondylitis, left    Neck pain, acute    Angina, class III    Paroxysmal atrial fibrillation    Sick sinus syndrome    Vitamin D deficiency     Past Medical History:  Past Medical History:   Diagnosis Date    Anxiety     Cholelithiasis     Depression     History of overdose years ago    DVT, bilateral lower limbs     GERD (gastroesophageal reflux disease)     Hyperlipidemia     Hypertension     Obesity     CASS (obstructive sleep apnea)     not treating    Pituitary microadenoma     Testosterone deficiency     Thrombocytopenia      Past Surgical History:  Past Surgical History:   Procedure Laterality Date    CARDIAC CATHETERIZATION N/A 02/03/2022    Procedure: Left Heart Cath;  Surgeon: Tevin Meza MD;  Location: [facility] CATH INVASIVE LOCATION;  Service: Cardiology;  Laterality: N/A;    CARDIAC CATHETERIZATION N/A 02/03/2022    Procedure: Coronary angiography;  Surgeon: Tevin Meza MD;  Location: [facility] CATH INVASIVE LOCATION;  Service: Cardiology;  Laterality: N/A;    CARDIAC CATHETERIZATION N/A 02/03/2022    Procedure: Left ventriculography;  Surgeon: Tevin Meza MD;  Location: [facility] CATH INVASIVE LOCATION;   Service: Cardiology;  Laterality: N/A;    CARDIAC ELECTROPHYSIOLOGY PROCEDURE N/A 06/30/2023    Procedure: Ablation atrial fibrillation, RF, rep sent email;  Surgeon: Sukumar Brady MD;  Location: [facility] CATH INVASIVE LOCATION;  Service: Cardiovascular;  Laterality: N/A;    CHOLECYSTECTOMY N/A 1/11/2025    Procedure: CHOLECYSTECTOMY LAPAROSCOPIC WITH DAVINCI ROBOT;  Surgeon: Alice Mayberry MD;  Location: [facility] MAIN OR;  Service: Robotics - DaVinci;  Laterality: N/A;    COLONOSCOPY      2011- due 5 years     CYSTOSCOPY, URETEROSCOPY, RETROGRADE PYELOGRAM, STENT INSERTION Right 08/10/2023    Procedure: CYSTOSCOPY, RIGHT URETEROSCOPY, RETROGRADE PYELOGRAM HOLMIUM LASER, STONE EXTRACTION AND  STENT INSERTION (NO STRING);  Surgeon: Ty Tucker MD;  Location: [facility] MAIN OR;  Service: Urology;  Laterality: Right;    HERNIA REPAIR      INGUINAL HERNIA REPAIR      JOINT REPLACEMENT      REPLACEMENT TOTAL KNEE Left 07/12/2023    SPLENECTOMY      TOTAL KNEE ARTHROPLASTY Bilateral     TOTAL SHOULDER REPLACEMENT  02/12/2021    VASECTOMY       Social History:  Social History     Socioeconomic History    Marital status:    Tobacco Use    Smoking status: Never     Passive exposure: Never    Smokeless tobacco: Never   Vaping Use    Vaping status: Never Used   Substance and Sexual Activity    Alcohol use: Yes     Alcohol/week: 20.0 standard drinks of alcohol     Types: 20 Cans of beer per week    Drug use: No    Sexual activity: Not Currently     Partners: Female     Birth control/protection: Condom, Vasectomy     Allergies:  Allergies   Allergen Reactions    Lipitor [Atorvastatin Calcium] Myalgia     Immunizations:  Immunization History   Administered Date(s) Administered    COVID-19 (PFIZER) Purple Cap Monovalent 03/13/2021, 04/03/2021, 01/08/2022    DTaP 04/27/2016    Fluzone Quad >6mos (Multi-dose) 10/07/2020    Meningococcal B,(Bexsero) 10/31/2018, 01/17/2019    Meningococcal Conjugate 04/27/2016, 07/27/2016     Pneumococcal Conjugate 13-Valent (PCV13) 04/27/2016    Pneumococcal Polysaccharide (PPSV23) 07/27/2016    Shingrix 08/12/2020, 10/06/2020, 02/26/2021    Tdap 04/27/2016            In-Office Procedure(s):  No orders to display        ASCVD RIsk Score::  The 10-year ASCVD risk score (Adia GAMBOA, et al., 2019) is: 7.9%    Values used to calculate the score:      Age: 64 years      Sex: Male      Is Non- : No      Diabetic: No      Tobacco smoker: No      Systolic Blood Pressure: 111 mmHg      Is BP treated: Yes      HDL Cholesterol: 63 mg/dL      Total Cholesterol: 179 mg/dL    Imaging:    Results for orders placed during the hospital encounter of 01/10/25    XR Chest 1 View    Narrative  XR CHEST 1 VW    Date of Exam: 1/10/2025 11:30 PM EST    Indication: increase O2 demand    Comparison: CT chest 1/23/2021 and chest radiograph 11/18/2015.    Findings:  Lung volumes are low. There is a right shoulder prosthesis in place. There is no pneumothorax, pleural effusion or focal airspace consolidation. Heart size and pulmonary vasculature appear within normal limits. Regional bones appear intact.    Impression  Impression:  Low lung volumes without superimposed acute process of the chest.          Electronically Signed: Jake Hadley MD  1/11/2025 12:07 AM EST  Workstation ID: [id number]       Results for orders placed during the hospital encounter of 01/10/25    CT Abdomen Pelvis With Contrast    Narrative  CT ABDOMEN PELVIS W CONTRAST    Date of Exam: 1/10/2025 9:00 AM EST    Indication: ruq pain.    Comparison: 8/9/2023    Technique: Axial CT images were obtained of the abdomen and pelvis following the uneventful intravenous administration of iodinated contrast. Sagittal and coronal reconstructions were performed.  Automated exposure control and iterative reconstruction  methods were used.        Findings:    Lower Chest: Atelectasis in the lung bases    Organs: Mildly distended gallbladder with  pericholecystic inflammatory haziness and calcified stone in the proximal cystic duct. Common duct normal in caliber. Punctate left upper pole renal calculus. Bilateral renal cysts. No hydronephrosis. Spleen  surgically absent. Liver, pancreas unremarkable    Gastrointestinal: No intestinal distention or evident wall thickening. Normal appendix    Pelvis: No abnormal fluid collection. Urinary bladder unremarkable    Peritoneum/Retroperitoneum: No ascites or pneumoperitoneum. Normal caliber aorta    Bones/Soft Tissues: No acute bony abnormality. Severe degenerative disc disease at L2-L3 and L5-S1    Impression  1. Acute cholecystitis with stone in the proximal cystic duct  2. Left nephrolithiasis              Electronically Signed: Cuong Stapleton  1/10/2025 9:16 AM EST  Workstation ID: [id number]      Results for orders placed during the hospital encounter of 01/10/25    CT Abdomen Pelvis With Contrast    Narrative  CT ABDOMEN PELVIS W CONTRAST    Date of Exam: 1/10/2025 9:00 AM EST    Indication: ruq pain.    Comparison: 8/9/2023    Technique: Axial CT images were obtained of the abdomen and pelvis following the uneventful intravenous administration of iodinated contrast. Sagittal and coronal reconstructions were performed.  Automated exposure control and iterative reconstruction  methods were used.        Findings:    Lower Chest: Atelectasis in the lung bases    Organs: Mildly distended gallbladder with pericholecystic inflammatory haziness and calcified stone in the proximal cystic duct. Common duct normal in caliber. Punctate left upper pole renal calculus. Bilateral renal cysts. No hydronephrosis. Spleen  surgically absent. Liver, pancreas unremarkable    Gastrointestinal: No intestinal distention or evident wall thickening. Normal appendix    Pelvis: No abnormal fluid collection. Urinary bladder unremarkable    Peritoneum/Retroperitoneum: No ascites or pneumoperitoneum. Normal caliber aorta    Bones/Soft Tissues:  No acute bony abnormality. Severe degenerative disc disease at L2-L3 and L5-S1    Impression  1. Acute cholecystitis with stone in the proximal cystic duct  2. Left nephrolithiasis              Electronically Signed: Cuong Pieter  1/10/2025 9:16 AM EST  Workstation ID: [id number]      Lab Review:   Admission on 01/10/2025, Discharged on 01/12/2025   Component Date Value    QT Interval 01/10/2025 364     QTC Interval 01/10/2025 416     Glucose 01/10/2025 133 (H)     BUN 01/10/2025 13     Creatinine 01/10/2025 1.03     Sodium 01/10/2025 134 (L)     Potassium 01/10/2025 4.6     Chloride 01/10/2025 100     CO2 01/10/2025 24.5     Calcium 01/10/2025 9.5     Total Protein 01/10/2025 6.7     Albumin 01/10/2025 3.8     ALT (SGPT) 01/10/2025 15     AST (SGOT) 01/10/2025 16     Alkaline Phosphatase 01/10/2025 75     Total Bilirubin 01/10/2025 0.7     Globulin 01/10/2025 2.9     A/G Ratio 01/10/2025 1.3     BUN/Creatinine Ratio 01/10/2025 12.6     Anion Gap 01/10/2025 9.5     eGFR 01/10/2025 81.6     Lipase 01/10/2025 15     WBC 01/10/2025 15.03 (H)     RBC 01/10/2025 4.66     Hemoglobin 01/10/2025 14.2     Hematocrit 01/10/2025 41.5     MCV 01/10/2025 89.1     MCH 01/10/2025 30.5     MCHC 01/10/2025 34.2     RDW 01/10/2025 15.0     RDW-SD 01/10/2025 49.6     MPV 01/10/2025 11.4     Platelets 01/10/2025 170     Neutrophil % 01/10/2025 81.6 (H)     Lymphocyte % 01/10/2025 5.7 (L)     Monocyte % 01/10/2025 11.2     Eosinophil % 01/10/2025 0.5     Basophil % 01/10/2025 0.5     Immature Grans % 01/10/2025 0.5     Neutrophils, Absolute 01/10/2025 12.27 (H)     Lymphocytes, Absolute 01/10/2025 0.85     Monocytes, Absolute 01/10/2025 1.69 (H)     Eosinophils, Absolute 01/10/2025 0.07     Basophils, Absolute 01/10/2025 0.07     Immature Grans, Absolute 01/10/2025 0.08 (H)     nRBC 01/10/2025 0.0     Extra Tube 01/10/2025 Hold for add-ons.     Magnesium 01/11/2025 1.8     Phosphorus 01/11/2025 1.7 (C)     WBC 01/11/2025 16.68 (H)      RBC 01/11/2025 4.44     Hemoglobin 01/11/2025 13.3     Hematocrit 01/11/2025 40.7     MCV 01/11/2025 91.7     MCH 01/11/2025 30.0     MCHC 01/11/2025 32.7     RDW 01/11/2025 15.3     RDW-SD 01/11/2025 51.7     MPV 01/11/2025 12.2 (H)     Platelets 01/11/2025 118 (L)     Neutrophil % 01/11/2025 91.3 (H)     Lymphocyte % 01/11/2025 2.5 (L)     Monocyte % 01/11/2025 4.7 (L)     Eosinophil % 01/11/2025 0.4     Basophil % 01/11/2025 0.3     Immature Grans % 01/11/2025 0.8 (H)     Neutrophils, Absolute 01/11/2025 15.23 (H)     Lymphocytes, Absolute 01/11/2025 0.41 (L)     Monocytes, Absolute 01/11/2025 0.79     Eosinophils, Absolute 01/11/2025 0.07     Basophils, Absolute 01/11/2025 0.05     Immature Grans, Absolute 01/11/2025 0.13 (H)     nRBC 01/11/2025 0.0     QT Interval 01/10/2025 322     QTC Interval 01/10/2025 459     Glucose 01/10/2025 111 (H)     Glucose 01/11/2025 93     BUN 01/11/2025 14     Creatinine 01/11/2025 1.44 (H)     Sodium 01/11/2025 133 (L)     Potassium 01/11/2025 4.5     Chloride 01/11/2025 102     CO2 01/11/2025 19.5 (L)     Calcium 01/11/2025 8.9     Total Protein 01/11/2025 6.5     Albumin 01/11/2025 3.3 (L)     ALT (SGPT) 01/11/2025 19     AST (SGOT) 01/11/2025 29     Alkaline Phosphatase 01/11/2025 94     Total Bilirubin 01/11/2025 1.0     Globulin 01/11/2025 3.2     A/G Ratio 01/11/2025 1.0     BUN/Creatinine Ratio 01/11/2025 9.7     Anion Gap 01/11/2025 11.5     eGFR 01/11/2025 54.6 (L)     Phosphorus 01/11/2025 3.2     Magnesium 01/11/2025 2.0     Phosphorus 01/11/2025 3.2     Ionized Calcium 01/11/2025 1.00 (L)     Glucose 01/11/2025 108 (H)     BUN 01/11/2025 19     Creatinine 01/11/2025 1.55 (H)     Sodium 01/11/2025 133 (L)     Potassium 01/11/2025 5.4 (H)     Chloride 01/11/2025 105     CO2 01/11/2025 16.5 (L)     Calcium 01/11/2025 8.2 (L)     Total Protein 01/11/2025 6.2     Albumin 01/11/2025 3.3 (L)     ALT (SGPT) 01/11/2025 26     AST (SGOT) 01/11/2025 57 (H)     Alkaline  Phosphatase 01/11/2025 83     Total Bilirubin 01/11/2025 0.9     Globulin 01/11/2025 2.9     A/G Ratio 01/11/2025 1.1     BUN/Creatinine Ratio 01/11/2025 12.3     Anion Gap 01/11/2025 11.5     eGFR 01/11/2025 50.0 (L)     Case Report 01/11/2025                      Value:Surgical Pathology Report                         Case: [id number]                                  Authorizing Provider:  Alice Mayberry MD           Collected:           01/11/2025 10:25 AM          Ordering Location:     [facility] MAIN  Received:            01/11/2025 12:07 PM                                 OR                                                                           Pathologist:           Tayo Jacobs MD                                                            Specimen:    Gallbladder                                                                                Final Diagnosis 01/11/2025                      Value:Gallbladder, cholecystectomy:  Acute acalculous cholecystitis    CRISTAL      Gross Description 01/11/2025                      Value:1. Gallbladder.  Received in formalin designated gallbladder is a gallbladder measuring 10.4 x 4.8 x 3 cm.  The serosa is reddish-pink with areas of gray purulent exudate.  The gallbladder is opened revealing a scant bile without bile or stones.  The mucosa is reddish and covered with greenish exudate.  No polyps or masses are identified.  Representative sections of cystic duct and gallbladder mucosa are submitted in 1 cassette.  CRISTAL 8        Magnesium 01/11/2025 2.0     Phosphorus 01/11/2025 2.2 (L)     Glucose 01/11/2025 137 (H)     BUN 01/11/2025 19     Creatinine 01/11/2025 1.15     Sodium 01/11/2025 132 (L)     Potassium 01/11/2025 4.5     Chloride 01/11/2025 101     CO2 01/11/2025 21.5 (L)     Calcium 01/11/2025 8.4 (L)     Total Protein 01/11/2025 6.2     Albumin 01/11/2025 3.4 (L)     ALT (SGPT) 01/11/2025 36     AST (SGOT) 01/11/2025 64 (H)     Alkaline Phosphatase  01/11/2025 81     Total Bilirubin 01/11/2025 0.4     Globulin 01/11/2025 2.8     A/G Ratio 01/11/2025 1.2     BUN/Creatinine Ratio 01/11/2025 16.5     Anion Gap 01/11/2025 9.5     eGFR 01/11/2025 71.5     Ionized Calcium 01/11/2025 1.11 (L)     WBC 01/11/2025 15.15 (H)     RBC 01/11/2025 3.81 (L)     Hemoglobin 01/11/2025 11.5 (L)     Hematocrit 01/11/2025 34.4 (L)     MCV 01/11/2025 90.3     MCH 01/11/2025 30.2     MCHC 01/11/2025 33.4     RDW 01/11/2025 15.3     RDW-SD 01/11/2025 51.5     MPV 01/11/2025 12.4 (H)     Platelets 01/11/2025 126 (L)     Neutrophil % 01/11/2025 92.7 (H)     Lymphocyte % 01/11/2025 2.7 (L)     Monocyte % 01/11/2025 3.8 (L)     Eosinophil % 01/11/2025 0.0 (L)     Basophil % 01/11/2025 0.1     Immature Grans % 01/11/2025 0.7 (H)     Neutrophils, Absolute 01/11/2025 14.05 (H)     Lymphocytes, Absolute 01/11/2025 0.41 (L)     Monocytes, Absolute 01/11/2025 0.57     Eosinophils, Absolute 01/11/2025 0.00     Basophils, Absolute 01/11/2025 0.01     Immature Grans, Absolute 01/11/2025 0.11 (H)     nRBC 01/11/2025 0.0     Phosphorus 01/12/2025 2.1 (L)      Recent labs reviewed and interpreted for clinical significance and application            Level of Care:           Tevin Meza MD  02/28/25  .

## 2025-03-26 RX ORDER — TRAZODONE HYDROCHLORIDE 150 MG/1
150 TABLET ORAL
Qty: 30 TABLET | Refills: 3 | Status: SHIPPED | OUTPATIENT
Start: 2025-03-26

## 2025-05-02 RX ORDER — ROSUVASTATIN CALCIUM 10 MG/1
10 TABLET, COATED ORAL DAILY
Qty: 30 TABLET | Refills: 3 | Status: SHIPPED | OUTPATIENT
Start: 2025-05-02

## 2025-05-02 RX ORDER — CITALOPRAM HYDROBROMIDE 20 MG/1
20 TABLET ORAL DAILY
Qty: 30 TABLET | Refills: 3 | Status: SHIPPED | OUTPATIENT
Start: 2025-05-02

## 2025-05-09 RX ORDER — RIVAROXABAN 20 MG/1
20 TABLET, FILM COATED ORAL DAILY
Qty: 30 TABLET | Refills: 1 | Status: SHIPPED | OUTPATIENT
Start: 2025-05-09

## 2025-05-30 RX ORDER — ALFUZOSIN HYDROCHLORIDE 10 MG/1
10 TABLET, EXTENDED RELEASE ORAL DAILY
Qty: 90 TABLET | Refills: 1 | Status: SHIPPED | OUTPATIENT
Start: 2025-05-30

## 2025-07-21 RX ORDER — RIVAROXABAN 20 MG/1
20 TABLET, FILM COATED ORAL DAILY
Qty: 30 TABLET | Refills: 1 | Status: SHIPPED | OUTPATIENT
Start: 2025-07-21

## 2025-08-28 ENCOUNTER — OFFICE VISIT (OUTPATIENT)
Dept: FAMILY MEDICINE CLINIC | Facility: CLINIC | Age: 65
End: 2025-08-28
Payer: COMMERCIAL

## 2025-08-28 VITALS
HEIGHT: 70 IN | RESPIRATION RATE: 18 BRPM | WEIGHT: 292.4 LBS | OXYGEN SATURATION: 96 % | DIASTOLIC BLOOD PRESSURE: 70 MMHG | HEART RATE: 68 BPM | BODY MASS INDEX: 41.86 KG/M2 | SYSTOLIC BLOOD PRESSURE: 126 MMHG

## 2025-08-28 DIAGNOSIS — E55.9 VITAMIN D DEFICIENCY: ICD-10-CM

## 2025-08-28 DIAGNOSIS — Z00.00 ANNUAL PHYSICAL EXAM: Primary | ICD-10-CM

## 2025-08-28 DIAGNOSIS — Z12.5 SCREENING PSA (PROSTATE SPECIFIC ANTIGEN): ICD-10-CM

## 2025-08-28 DIAGNOSIS — E78.49 OTHER HYPERLIPIDEMIA: ICD-10-CM

## 2025-08-28 DIAGNOSIS — I10 HYPERTENSION, UNSPECIFIED TYPE: ICD-10-CM

## 2025-08-28 DIAGNOSIS — Z12.11 SCREENING FOR COLON CANCER: ICD-10-CM

## 2025-08-28 PROCEDURE — 80061 LIPID PANEL: CPT | Performed by: NURSE PRACTITIONER

## 2025-08-28 PROCEDURE — 82306 VITAMIN D 25 HYDROXY: CPT | Performed by: NURSE PRACTITIONER

## 2025-08-28 PROCEDURE — G0103 PSA SCREENING: HCPCS | Performed by: NURSE PRACTITIONER

## 2025-08-28 PROCEDURE — 83036 HEMOGLOBIN GLYCOSYLATED A1C: CPT | Performed by: NURSE PRACTITIONER

## 2025-08-28 PROCEDURE — 81001 URINALYSIS AUTO W/SCOPE: CPT | Performed by: NURSE PRACTITIONER

## 2025-08-28 PROCEDURE — 85025 COMPLETE CBC W/AUTO DIFF WBC: CPT | Performed by: NURSE PRACTITIONER

## 2025-08-28 PROCEDURE — 80053 COMPREHEN METABOLIC PANEL: CPT | Performed by: NURSE PRACTITIONER

## (undated) DEVICE — NITINOL WIRE WITH HYDROPHILIC TIP: Brand: SENSOR

## (undated) DEVICE — SOL IRRG H2O BG 3000ML STRL

## (undated) DEVICE — PAD, GROUNDING, UNIVERSAL, SPLIT, 9': Brand: MEDLINE

## (undated) DEVICE — GLV SURG SIGNATURE ESSENTIAL PF LTX SZ7

## (undated) DEVICE — PASS SUT PRO BARIATRIC XL W/TROC SWABS

## (undated) DEVICE — CATH MPAC STP 6F: Brand: SUPER TORQUE

## (undated) DEVICE — TBG INSUFF MALE L/L W 12MM CON: Brand: MEDLINE INDUSTRIES, INC.

## (undated) DEVICE — ANGIO-SEAL VIP VASCULAR CLOSURE DEVICE: Brand: ANGIO-SEAL

## (undated) DEVICE — ST ACC MICROPUNCTURE STFF/CANN PLAT/TP 4F 21G 40CM

## (undated) DEVICE — ENDOPATH PNEUMONEEDLE INSUFFLATION NEEDLES WITH LUER LOCK CONNECTORS 120MM: Brand: ENDOPATH

## (undated) DEVICE — SPNG GZ AVANT 6PLY 4X4IN STRL PK/2

## (undated) DEVICE — PK TRY HEART CATH 50

## (undated) DEVICE — PRT BIOP SEALS

## (undated) DEVICE — FIBR LASR HOLMIUM 200 MICRON DISP

## (undated) DEVICE — BG RETRV TISS SUPERBAG INTRO RIP/STOP NLY 5/7MM 140ML MD

## (undated) DEVICE — CATH DIAG IMPULSE FL3.5 6F 100CM

## (undated) DEVICE — SYS IRR PUMP SGL ACTN VAC SYR 10CC

## (undated) DEVICE — RESERVOIR,SUCTION,100CC,SILICONE: Brand: MEDLINE

## (undated) DEVICE — SYR LUERLOK 30CC

## (undated) DEVICE — KT SURG TURNOVER 050

## (undated) DEVICE — GW PTFE EMERALD HEPCOAT FC J TIP STD .035 3MM 150CM

## (undated) DEVICE — ARM DRAPE

## (undated) DEVICE — SEAL

## (undated) DEVICE — ENDOPATH XCEL BLADELESS TROCARS WITH STABILITY SLEEVES: Brand: ENDOPATH XCEL

## (undated) DEVICE — THE STERILE LIGHT HANDLE COVER IS USED WITH STERIS SURGICAL LIGHTING AND VISUALIZATION SYSTEMS.

## (undated) DEVICE — PINNACLE INTRODUCER SHEATH: Brand: PINNACLE

## (undated) DEVICE — SUT VIC 0 UR6 27IN VCP603H

## (undated) DEVICE — NITINOL STONE RETRIEVAL BASKET: Brand: ZERO TIP

## (undated) DEVICE — COLUMN DRAPE

## (undated) DEVICE — GLV SURG SENSICARE POLYISPRN W/ALOE PF LF 6.5 GRN STRL

## (undated) DEVICE — BLADELESS OBTURATOR: Brand: WECK VISTA

## (undated) DEVICE — GENERAL LAPAROSCOPY CDS: Brand: MEDLINE INDUSTRIES, INC.

## (undated) DEVICE — DUAL LUMEN URETERAL CATHETER

## (undated) DEVICE — GLOVE,SURG,SENSICARE SLT,LF,PF,6: Brand: MEDLINE

## (undated) DEVICE — DRN WND FLT FUL PERF NO/TROC 10MM

## (undated) DEVICE — ANTIBACTERIAL UNDYED BRAIDED (POLYGLACTIN 910), SYNTHETIC ABSORBABLE SUTURE: Brand: COATED VICRYL

## (undated) DEVICE — THE STERILE CAMERA HANDLE COVER IS FOR USE WITH THE STERIS SURGICAL LIGHTING AND VISUALIZATION SYSTEMS.

## (undated) DEVICE — GAUZE,SPONGE,4"X4",16PLY,XRAY,STRL,LF: Brand: MEDLINE

## (undated) DEVICE — BLANKT WARM UPPR/BDY ARM/OUT 57X196CM

## (undated) DEVICE — SOLUTION,WATER,IRRIGATION,1000ML,STERILE: Brand: MEDLINE

## (undated) DEVICE — PK CYSTO 50

## (undated) DEVICE — SUCTION IRRIGATOR: Brand: ENDOWRIST